# Patient Record
Sex: FEMALE | Race: WHITE | NOT HISPANIC OR LATINO | Employment: PART TIME | URBAN - METROPOLITAN AREA
[De-identification: names, ages, dates, MRNs, and addresses within clinical notes are randomized per-mention and may not be internally consistent; named-entity substitution may affect disease eponyms.]

---

## 2018-10-01 ENCOUNTER — HOSPITAL ENCOUNTER (OUTPATIENT)
Dept: FAMILY MEDICINE CLINIC | Facility: CLINIC | Age: 33
Setting detail: SPECIMEN
Discharge: HOME | End: 2018-10-01
Attending: PREVENTIVE MEDICINE | Admitting: PREVENTIVE MEDICINE

## 2018-10-01 LAB
ALBUMIN SERPL-MCNC: 4 G/DL (ref 3.5–4.8)
ALBUMIN/GLOB SERPL: 1.3 {RATIO} (ref 1–1.7)
ALP SERPL-CCNC: 70 IU/L (ref 32–91)
ALT SERPL-CCNC: 26 IU/L (ref 14–54)
ANION GAP SERPL CALC-SCNC: 12.8 MMOL/L (ref 10–20)
AST SERPL-CCNC: 21 IU/L (ref 15–41)
BASOPHILS # BLD AUTO: 0 10*3/UL (ref 0–0.2)
BASOPHILS NFR BLD AUTO: 1 % (ref 0–2)
BILIRUB SERPL-MCNC: 0.6 MG/DL (ref 0.3–1.2)
BUN SERPL-MCNC: 11 MG/DL (ref 8–20)
BUN/CREAT SERPL: 15.7 (ref 5.4–26.2)
CALCIUM SERPL-MCNC: 9.2 MG/DL (ref 8.9–10.3)
CHLORIDE SERPL-SCNC: 101 MMOL/L (ref 101–111)
CHOLEST SERPL-MCNC: 213 MG/DL
CHOLEST/HDLC SERPL: 4.9 {RATIO}
CONV CO2: 25 MMOL/L (ref 22–32)
CONV LDL CHOLESTEROL DIRECT: 157 MG/DL (ref 0–100)
CONV TOTAL PROTEIN: 7.1 G/DL (ref 6.1–7.9)
CREAT UR-MCNC: 0.7 MG/DL (ref 0.4–1)
DIFFERENTIAL METHOD BLD: (no result)
EOSINOPHIL # BLD AUTO: 0.1 10*3/UL (ref 0–0.3)
EOSINOPHIL # BLD AUTO: 1 % (ref 0–3)
ERYTHROCYTE [DISTWIDTH] IN BLOOD BY AUTOMATED COUNT: 12.1 % (ref 11.5–14.5)
ERYTHROCYTE [SEDIMENTATION RATE] IN BLOOD BY WESTERGREN METHOD: 48 MM/HR (ref 0–20)
GLOBULIN UR ELPH-MCNC: 3.1 G/DL (ref 2.5–3.8)
GLUCOSE SERPL-MCNC: 104 MG/DL (ref 65–99)
HCT VFR BLD AUTO: 36.9 % (ref 35–49)
HDLC SERPL-MCNC: 43 MG/DL
HGB BLD-MCNC: 12.9 G/DL (ref 12–15)
LDLC/HDLC SERPL: 3.6 {RATIO}
LIPID INTERPRETATION: ABNORMAL
LYMPHOCYTES # BLD AUTO: 2.2 10*3/UL (ref 0.8–4.8)
LYMPHOCYTES NFR BLD AUTO: 36 % (ref 18–42)
MAGNESIUM SERPL-MCNC: 1.8 MG/DL (ref 1.8–2.5)
MCH RBC QN AUTO: 32.4 PG (ref 26–32)
MCHC RBC AUTO-ENTMCNC: 34.9 G/DL (ref 32–36)
MCV RBC AUTO: 92.8 FL (ref 80–94)
MONOCYTES # BLD AUTO: 0.6 10*3/UL (ref 0.1–1.3)
MONOCYTES NFR BLD AUTO: 10 % (ref 2–11)
NEUTROPHILS # BLD AUTO: 3.3 10*3/UL (ref 2.3–8.6)
NEUTROPHILS NFR BLD AUTO: 52 % (ref 50–75)
NRBC BLD AUTO-RTO: 0 /100{WBCS}
NRBC/RBC NFR BLD MANUAL: 0 10*3/UL
PLATELET # BLD AUTO: 487 10*3/UL (ref 150–450)
PMV BLD AUTO: 6.8 FL (ref 7.4–10.4)
POTASSIUM SERPL-SCNC: 3.8 MMOL/L (ref 3.6–5.1)
RBC # BLD AUTO: 3.97 10*6/UL (ref 4–5.4)
SODIUM SERPL-SCNC: 135 MMOL/L (ref 136–144)
TRIGL SERPL-MCNC: 166 MG/DL
TSH SERPL-ACNC: 1.37 UIU/ML (ref 0.34–5.6)
VIT B12 SERPL-MCNC: 211 PG/ML (ref 180–914)
VLDLC SERPL CALC-MCNC: 12.3 MG/DL
WBC # BLD AUTO: 6.3 10*3/UL (ref 4.5–11.5)

## 2018-10-02 LAB
25(OH)D3 SERPL-MCNC: 22 NG/ML (ref 30–100)
HBA1C MFR BLD: 5 % (ref 0–5.6)

## 2018-11-13 ENCOUNTER — HOSPITAL ENCOUNTER (OUTPATIENT)
Dept: FAMILY MEDICINE CLINIC | Facility: CLINIC | Age: 33
Setting detail: SPECIMEN
Discharge: HOME | End: 2018-11-13
Attending: PREVENTIVE MEDICINE | Admitting: PREVENTIVE MEDICINE

## 2018-11-13 LAB
AMPICILLIN SUSC ISLT: NORMAL
AZTREONAM SUSC ISLT: NORMAL
BACTERIA ISLT: NORMAL
BACTERIA SPEC AEROBE CULT: NORMAL
BASOPHILS # BLD AUTO: 0 10*3/UL (ref 0–0.2)
BASOPHILS NFR BLD AUTO: 1 % (ref 0–2)
BILIRUB UR QL STRIP: NEGATIVE MG/DL
CASTS URNS QL MICRO: ABNORMAL /[LPF]
CEFAZOLIN SUSC ISLT: NORMAL
CEFEPIME SUSC ISLT: NORMAL
CEFTRIAXONE SUSC ISLT: NORMAL
CHOLEST SERPL-MCNC: 217 MG/DL
CHOLEST/HDLC SERPL: 4.6 {RATIO}
CIPROFLOXACIN SUSC ISLT: NORMAL
COLONY COUNT: NORMAL
COLOR UR: YELLOW
CONV BACTERIA IN URINE MICRO: ABNORMAL
CONV CLARITY OF URINE: ABNORMAL
CONV HYALINE CASTS IN URINE MICRO: 2 /[LPF] (ref 0–5)
CONV LDL CHOLESTEROL DIRECT: 154 MG/DL (ref 0–100)
CONV PROTEIN IN URINE BY AUTOMATED TEST STRIP: NEGATIVE MG/DL
CONV SMALL ROUND CELLS: ABNORMAL /[HPF]
CONV UROBILINOGEN IN URINE BY AUTOMATED TEST STRIP: 0.2 MG/DL
CULTURE INDICATED?: ABNORMAL
DIFFERENTIAL METHOD BLD: (no result)
EOSINOPHIL # BLD AUTO: 0.1 10*3/UL (ref 0–0.3)
EOSINOPHIL # BLD AUTO: 2 % (ref 0–3)
ERYTHROCYTE [DISTWIDTH] IN BLOOD BY AUTOMATED COUNT: 12.5 % (ref 11.5–14.5)
GLUCOSE UR QL: NEGATIVE MG/DL
HCT VFR BLD AUTO: 38 % (ref 35–49)
HDLC SERPL-MCNC: 48 MG/DL
HGB BLD-MCNC: 13.1 G/DL (ref 12–15)
HGB UR QL STRIP: NEGATIVE
KETONES UR QL STRIP: NEGATIVE MG/DL
LDLC/HDLC SERPL: 3.2 {RATIO}
LEUKOCYTE ESTERASE UR QL STRIP: ABNORMAL
LEVOFLOXACIN SUSC ISLT: NORMAL
LIPID INTERPRETATION: ABNORMAL
LYMPHOCYTES # BLD AUTO: 2 10*3/UL (ref 0.8–4.8)
LYMPHOCYTES NFR BLD AUTO: 39 % (ref 18–42)
Lab: NORMAL
MCH RBC QN AUTO: 32.5 PG (ref 26–32)
MCHC RBC AUTO-ENTMCNC: 34.5 G/DL (ref 32–36)
MCV RBC AUTO: 94.4 FL (ref 80–94)
MEROPENEM SUSC ISLT: NORMAL
MICRO REPORT STATUS: NORMAL
MONOCYTES # BLD AUTO: 0.4 10*3/UL (ref 0.1–1.3)
MONOCYTES NFR BLD AUTO: 8 % (ref 2–11)
NEUTROPHILS # BLD AUTO: 2.6 10*3/UL (ref 2.3–8.6)
NEUTROPHILS NFR BLD AUTO: 50 % (ref 50–75)
NITRITE UR QL STRIP: NEGATIVE
NITROFURANTOIN SUSC ISLT: NORMAL
NRBC BLD AUTO-RTO: 0 /100{WBCS}
NRBC/RBC NFR BLD MANUAL: 0 10*3/UL
PH UR STRIP.AUTO: 5.5 [PH] (ref 4.5–8)
PIP+TAZO SUSC ISLT: NORMAL
PLATELET # BLD AUTO: 471 10*3/UL (ref 150–450)
PMV BLD AUTO: 6.5 FL (ref 7.4–10.4)
RBC # BLD AUTO: 4.02 10*6/UL (ref 4–5.4)
RBC #/AREA URNS HPF: 8 /[HPF] (ref 0–3)
SP GR UR: 1.02 (ref 1–1.03)
SPECIMEN SOURCE: NORMAL
SPERM URNS QL MICRO: ABNORMAL /[HPF]
SQUAMOUS SPT QL MICRO: 9 /[HPF] (ref 0–5)
SUSC METH SPEC: NORMAL
TETRACYCLINE SUSC ISLT: NORMAL
TOBRAMYCIN SUSC ISLT: NORMAL
TRIGL SERPL-MCNC: 167 MG/DL
TRIMETHOPRIM/SULFA: NORMAL
UNIDENT CRYS URNS QL MICRO: ABNORMAL /[HPF]
VLDLC SERPL CALC-MCNC: 16 MG/DL
WBC # BLD AUTO: 5.2 10*3/UL (ref 4.5–11.5)
WBC #/AREA URNS HPF: 8 /[HPF] (ref 0–5)
YEAST SPEC QL WET PREP: ABNORMAL /[HPF]

## 2019-01-03 ENCOUNTER — HOSPITAL ENCOUNTER (OUTPATIENT)
Dept: FAMILY MEDICINE CLINIC | Facility: CLINIC | Age: 34
Setting detail: SPECIMEN
Discharge: HOME | End: 2019-01-03
Attending: PREVENTIVE MEDICINE | Admitting: PREVENTIVE MEDICINE

## 2019-01-03 LAB
ALBUMIN SERPL-MCNC: 4.1 G/DL (ref 3.5–4.8)
ALBUMIN/GLOB SERPL: 1.2 {RATIO} (ref 1–1.7)
ALP SERPL-CCNC: 65 IU/L (ref 32–91)
ALT SERPL-CCNC: 24 IU/L (ref 14–54)
ANION GAP SERPL CALC-SCNC: 12.1 MMOL/L (ref 10–20)
AST SERPL-CCNC: 16 IU/L (ref 15–41)
BASOPHILS # BLD AUTO: 0 10*3/UL (ref 0–0.2)
BASOPHILS NFR BLD AUTO: 0 % (ref 0–2)
BILIRUB SERPL-MCNC: 0.8 MG/DL (ref 0.3–1.2)
BUN SERPL-MCNC: 13 MG/DL (ref 8–20)
BUN/CREAT SERPL: 21.7 (ref 5.4–26.2)
CALCIUM SERPL-MCNC: 9.2 MG/DL (ref 8.9–10.3)
CHLORIDE SERPL-SCNC: 103 MMOL/L (ref 101–111)
CHOLEST SERPL-MCNC: 214 MG/DL
CHOLEST/HDLC SERPL: 4.6 {RATIO}
CONV CO2: 23 MMOL/L (ref 22–32)
CONV LDL CHOLESTEROL DIRECT: 162 MG/DL (ref 0–100)
CONV TOTAL PROTEIN: 7.4 G/DL (ref 6.1–7.9)
CREAT UR-MCNC: 0.6 MG/DL (ref 0.4–1)
DIFFERENTIAL METHOD BLD: (no result)
EOSINOPHIL # BLD AUTO: 0.1 10*3/UL (ref 0–0.3)
EOSINOPHIL # BLD AUTO: 1 % (ref 0–3)
ERYTHROCYTE [DISTWIDTH] IN BLOOD BY AUTOMATED COUNT: 12.7 % (ref 11.5–14.5)
ERYTHROCYTE [SEDIMENTATION RATE] IN BLOOD BY WESTERGREN METHOD: 6 MM/HR (ref 0–20)
GLOBULIN UR ELPH-MCNC: 3.3 G/DL (ref 2.5–3.8)
GLUCOSE SERPL-MCNC: 95 MG/DL (ref 65–99)
HCT VFR BLD AUTO: 38.5 % (ref 35–49)
HDLC SERPL-MCNC: 46 MG/DL
HGB BLD-MCNC: 13.3 G/DL (ref 12–15)
LDLC/HDLC SERPL: 3.5 {RATIO}
LIPID INTERPRETATION: ABNORMAL
LYMPHOCYTES # BLD AUTO: 1.2 10*3/UL (ref 0.8–4.8)
LYMPHOCYTES NFR BLD AUTO: 13 % (ref 18–42)
MCH RBC QN AUTO: 32.8 PG (ref 26–32)
MCHC RBC AUTO-ENTMCNC: 34.7 G/DL (ref 32–36)
MCV RBC AUTO: 94.5 FL (ref 80–94)
MONOCYTES # BLD AUTO: 0.7 10*3/UL (ref 0.1–1.3)
MONOCYTES NFR BLD AUTO: 8 % (ref 2–11)
NEUTROPHILS # BLD AUTO: 6.8 10*3/UL (ref 2.3–8.6)
NEUTROPHILS NFR BLD AUTO: 78 % (ref 50–75)
NRBC BLD AUTO-RTO: 0 /100{WBCS}
NRBC/RBC NFR BLD MANUAL: 0 10*3/UL
PLATELET # BLD AUTO: 471 10*3/UL (ref 150–450)
PMV BLD AUTO: 6.6 FL (ref 7.4–10.4)
POTASSIUM SERPL-SCNC: 4.1 MMOL/L (ref 3.6–5.1)
RBC # BLD AUTO: 4.07 10*6/UL (ref 4–5.4)
SODIUM SERPL-SCNC: 134 MMOL/L (ref 136–144)
TRIGL SERPL-MCNC: 213 MG/DL
VLDLC SERPL CALC-MCNC: 5.9 MG/DL
WBC # BLD AUTO: 8.8 10*3/UL (ref 4.5–11.5)

## 2019-01-21 ENCOUNTER — CLINICAL SUPPORT (OUTPATIENT)
Dept: ONCOLOGY | Facility: HOSPITAL | Age: 34
End: 2019-01-21

## 2019-01-21 ENCOUNTER — HOSPITAL ENCOUNTER (OUTPATIENT)
Dept: ONCOLOGY | Facility: HOSPITAL | Age: 34
Discharge: HOME | End: 2019-01-21
Attending: INTERNAL MEDICINE | Admitting: INTERNAL MEDICINE

## 2019-01-21 ENCOUNTER — HOSPITAL ENCOUNTER (OUTPATIENT)
Dept: ONCOLOGY | Facility: CLINIC | Age: 34
Setting detail: INFUSION SERIES
Discharge: HOME | End: 2019-01-21
Attending: INTERNAL MEDICINE | Admitting: INTERNAL MEDICINE

## 2019-01-21 LAB
CRP SERPL-MCNC: 0.41 MG/DL (ref 0–0.7)
ERYTHROCYTE [SEDIMENTATION RATE] IN BLOOD BY WESTERGREN METHOD: 41 MM/HR (ref 0–20)
IRON SATN MFR SERPL: 14 % (ref 15–50)
IRON SERPL-MCNC: 49 UG/DL (ref 28–170)
TIBC SERPL-MCNC: 338 UG/DL (ref 228–428)

## 2019-01-21 NOTE — PROGRESS NOTES
PATIENTS ONCOLOGY RECORD LOCATED IN [facility]      Subjective     Name:  ELFEGO KIM     Date:  2019  Address:  [address] [place], [place] [address] [address]  Home: [unfilled]  :  1985 AGE:  33 y.o.        RECORDS OBTAINED:  Patients Oncology Record is located in [facility]

## 2019-02-04 ENCOUNTER — CLINICAL SUPPORT (OUTPATIENT)
Dept: ONCOLOGY | Facility: HOSPITAL | Age: 34
End: 2019-02-04

## 2019-02-04 ENCOUNTER — HOSPITAL ENCOUNTER (OUTPATIENT)
Dept: ONCOLOGY | Facility: CLINIC | Age: 34
Setting detail: INFUSION SERIES
Discharge: HOME | End: 2019-02-04
Attending: INTERNAL MEDICINE | Admitting: INTERNAL MEDICINE

## 2019-02-04 NOTE — PROGRESS NOTES
PATIENTS ONCOLOGY RECORD LOCATED IN [facility]      Subjective     Name:  ELFEGO KIM     Date:  2019  Address:  [address] [place], [place] [address] [id number]  Home: [unfilled]  :  1985 AGE:  33 y.o.        RECORDS OBTAINED:  Patients Oncology Record is located in [facility]

## 2019-03-19 ENCOUNTER — HOSPITAL ENCOUNTER (OUTPATIENT)
Dept: ONCOLOGY | Facility: CLINIC | Age: 34
Discharge: HOME | End: 2019-03-19
Attending: INTERNAL MEDICINE | Admitting: INTERNAL MEDICINE

## 2019-05-29 ENCOUNTER — HOSPITAL ENCOUNTER (OUTPATIENT)
Dept: FAMILY MEDICINE CLINIC | Facility: CLINIC | Age: 34
Setting detail: SPECIMEN
Discharge: HOME | End: 2019-05-29
Attending: PREVENTIVE MEDICINE | Admitting: PREVENTIVE MEDICINE

## 2019-05-29 LAB
ALBUMIN SERPL-MCNC: 4 G/DL (ref 3.5–4.8)
ALBUMIN/GLOB SERPL: 1.3 {RATIO} (ref 1–1.7)
ALP SERPL-CCNC: 56 IU/L (ref 32–91)
ALT SERPL-CCNC: 31 IU/L (ref 14–54)
ANION GAP SERPL CALC-SCNC: 11.9 MMOL/L (ref 10–20)
AST SERPL-CCNC: 21 IU/L (ref 15–41)
BASOPHILS # BLD AUTO: 0 10*3/UL (ref 0–0.2)
BASOPHILS NFR BLD AUTO: 1 % (ref 0–2)
BILIRUB SERPL-MCNC: 0.4 MG/DL (ref 0.3–1.2)
BUN SERPL-MCNC: 8 MG/DL (ref 8–20)
BUN/CREAT SERPL: 11.4 (ref 5.4–26.2)
CALCIUM SERPL-MCNC: 9.1 MG/DL (ref 8.9–10.3)
CHLORIDE SERPL-SCNC: 104 MMOL/L (ref 101–111)
CHOLEST SERPL-MCNC: 222 MG/DL
CHOLEST/HDLC SERPL: 5.1 {RATIO}
CONV CO2: 23 MMOL/L (ref 22–32)
CONV LDL CHOLESTEROL DIRECT: 156 MG/DL (ref 0–100)
CONV TOTAL PROTEIN: 7.2 G/DL (ref 6.1–7.9)
CREAT UR-MCNC: 0.7 MG/DL (ref 0.4–1)
DIFFERENTIAL METHOD BLD: (no result)
EOSINOPHIL # BLD AUTO: 0.1 10*3/UL (ref 0–0.3)
EOSINOPHIL # BLD AUTO: 2 % (ref 0–3)
ERYTHROCYTE [DISTWIDTH] IN BLOOD BY AUTOMATED COUNT: 12.2 % (ref 11.5–14.5)
GLOBULIN UR ELPH-MCNC: 3.2 G/DL (ref 2.5–3.8)
GLUCOSE SERPL-MCNC: 92 MG/DL (ref 65–99)
HCT VFR BLD AUTO: 38.5 % (ref 35–49)
HDLC SERPL-MCNC: 44 MG/DL
HGB BLD-MCNC: 12.9 G/DL (ref 12–15)
LDLC/HDLC SERPL: 3.6 {RATIO}
LIPID INTERPRETATION: ABNORMAL
LYMPHOCYTES # BLD AUTO: 2.3 10*3/UL (ref 0.8–4.8)
LYMPHOCYTES NFR BLD AUTO: 42 % (ref 18–42)
MCH RBC QN AUTO: 31.7 PG (ref 26–32)
MCHC RBC AUTO-ENTMCNC: 33.5 G/DL (ref 32–36)
MCV RBC AUTO: 94.6 FL (ref 80–94)
MONOCYTES # BLD AUTO: 0.4 10*3/UL (ref 0.1–1.3)
MONOCYTES NFR BLD AUTO: 8 % (ref 2–11)
NEUTROPHILS # BLD AUTO: 2.6 10*3/UL (ref 2.3–8.6)
NEUTROPHILS NFR BLD AUTO: 47 % (ref 50–75)
NRBC BLD AUTO-RTO: 0 /100{WBCS}
NRBC/RBC NFR BLD MANUAL: 0 10*3/UL
PLATELET # BLD AUTO: 463 10*3/UL (ref 150–450)
PMV BLD AUTO: 6.9 FL (ref 7.4–10.4)
POTASSIUM SERPL-SCNC: 3.9 MMOL/L (ref 3.6–5.1)
RBC # BLD AUTO: 4.07 10*6/UL (ref 4–5.4)
SODIUM SERPL-SCNC: 135 MMOL/L (ref 136–144)
TRIGL SERPL-MCNC: 246 MG/DL
TSH SERPL-ACNC: 1.2 UIU/ML (ref 0.34–5.6)
VIT B12 SERPL-MCNC: 247 PG/ML (ref 180–914)
VLDLC SERPL CALC-MCNC: 22.3 MG/DL
WBC # BLD AUTO: 5.4 10*3/UL (ref 4.5–11.5)

## 2019-05-30 LAB — 25(OH)D3 SERPL-MCNC: 27 NG/ML (ref 30–100)

## 2019-05-31 ENCOUNTER — CONVERSION ENCOUNTER (OUTPATIENT)
Dept: FAMILY MEDICINE CLINIC | Facility: CLINIC | Age: 34
End: 2019-05-31

## 2019-06-04 VITALS
OXYGEN SATURATION: 97 % | DIASTOLIC BLOOD PRESSURE: 77 MMHG | SYSTOLIC BLOOD PRESSURE: 110 MMHG | BODY MASS INDEX: 35.38 KG/M2 | HEART RATE: 85 BPM | WEIGHT: 192.25 LBS | HEIGHT: 62 IN | RESPIRATION RATE: 16 BRPM

## 2019-06-06 NOTE — PROGRESS NOTES
Visit Type:  Follow-up Visit  Referring Provider:  Belinda Smith MD  Primary Provider:  Luis LÓPEZ MD MPH    CC:  depression and lab work .    History of Present Illness:  Patient comes in for Prevention physical and is UTD on all health maintainence marks        This is a 33 years old female who presents with depression.  She complains of drastic weight changes, but denies anxiety.  The patient has not tried Amitriptyline, Celexa, Cymbalta, Depakote, Effexor, Elavil, Haldol, Lexapro, Paxil, Prozac, Remeron,   Serzone, Wellbutrin, Xanax and Zoloft.  For counseling the patient has not seen anyone.  The patient has had problems in the past with nothing.  Concerned about thrombocytosis and wants to wait and repeat labs 8/19-no severe bleeding problems or clots.    Still having problems with urinary control   Doesn't want to see psyche but will try CBT on line.      Past Medical History:     Reviewed history from 09/28/2018 and no changes required:        headaches        sweats        neck pain        shoulder pain        bloating         diarrhea        Stomach pain        sinus problems        bruise easily         changes in moles         hot flashes        No know drug allergies?    Past Surgical History:     Reviewed history from 09/28/2018 and no changes required:        2 c sections         eye injury repair eye w/ 2 other follow up surgeries        partial hysterectomy         eye muscle surgery         repair of the cervix    Family History Summary:      Reviewed history Last on 02/15/2019 and no changes required:05/31/2019  Sister - Has Family History of Psychiatric Care - Entered On: 9/28/2018  Father - Has Family History of Hypertension - Entered On: 9/28/2018  Father - Has Family History of Diabetes - Entered On: 9/28/2018      Social History:     Reviewed history from 01/03/2019 and no changes required:        Passive Smoke: N        Alcohol Use: N        Drug Use: N        HIV/High Risk: N         Regular Exercise: N        Hx Domestic Abuse: N        Buddhism Affecting Care: N        Marital Status:         Children: 3        Occupation: [employer]                Smoking History:        Patient has never smoked.      Active Medications (reviewed today):  None    Current Allergies (reviewed today):  No known allergies    Current Medications (including medications started today):   None      Risk Factors:     Smoked Tobacco Use:  Never smoker  Smokeless Tobacco Use:  Never  Passive smoke exposure:  no  Drug use:  no  HIV high-risk behavior:  no  Caffeine use:  2 drinks per day  Alcohol use:  no  Exercise:  no  Seatbelt use:  100 %  Sun Exposure:  occasionally    Dietary Counseling: yes    Previous Tobacco Use: Signed On - 02/15/2019  Smoked Tobacco Use:  Never smoker  Smokeless Tobacco Use:  Never  Passive smoke exposure:  no  Drug use:  no  HIV high-risk behavior:  no  Caffeine use:  2 drinks per day    Previous Alcohol Use: Signed On - 02/15/2019  Alcohol use:  no  Exercise:  no  Seatbelt use:  100 %  Sun Exposure:  occasionally    Dietary Counseling: yes      Review of Systems     General       Complains of weight gain.    Eyes       Denies vision loss - 1 eye, double vision, eye irritation, vision loss - both eyes, blurring, eye pain, halos, discharge, light sensitivity, Color Blindness, Decreased night vision, excessive tearing, eye redness, periorbital puffiness and wears   glass/contacts.    ENT       Denies ringing in the ears, ear discharge, earache, decreased hearing, nasal congestion, nosebleeds, difficulty swallowing, hoarseness, sore throat, Post-nasal drainage, sneezing, bleeding gums, oral ulcers, decreased sense of smell, dental issues,   runny nose, sinus pain and decreased sense of taste.    CV       Denies difficulty breathing at night, near fainting, chest pain or discomfort, racing/skipping heart beats, fatigue, lightheadedness, shortness of breath with exertion,  palpitations, swelling of hands or feet, difficulty breathing while lying down,   fainting, leg cramps with exertion, bluish discoloration of lips or nails, weight gain, leg cramps, leg pain, varicose veins, paroxysmal nocturnal dyspnea and bluish or purplish discoloration of hands/feet.    Resp       Denies sleep disturbances due to breathing, cough, shortness of breath, coughing up blood, chest discomfort, wheezing, excessive sputum, excessive snoring and TB exposure risk.    GI       Denies excessive appetite, loss of appetite, indigestion, vomiting blood, nausea, vomiting, yellowish skin color, gas, abdominal pain, abdominal bloating, hemorrhoids, diarrhea, change in bowel habits, constipation, dark tarry stools, bloody stools,   abdominal mass, abdominal swelling, food intolerance, early satiety, stool incontinence, laxative use, odynophagia, painful defecation, need for antacids and blood after wiping.           Complains of inability to control bladder.    MS       Denies muscle cramps, joint pain, joint swelling, presence of joint fluid, back pain, stiffness, muscle weakness, arthritis, gout, loss of strength, muscle aches, neck pain, decreased ROM and joint redness.    Derm       Denies excessive perspiration, night sweats, suspicious lesions, changes in nail beds, dryness, poor wound healing, unusual hair distribution, skin cancer, itching, changes in color of skin, flushing, rash, change in lesion, hair loss, change in hair   texture, lesion with inflammation, lesion with increase in size and lesion with change in color.    Neuro       Denies difficulty with concentration, poor balance, headaches, disturbances in coordination, numbness, inability to speak, falling down, tingling, brief paralysis, visual disturbances, seizures, weakness, sensation of room spinning, tremors, fainting,   excessive daytime sleeping, memory loss, dizziness, attention deficit, hyperactivity, unusual sensation and restless  legs.    Psych       Complains of mental problems and depression.    Endo       Denies excessive hunger, cold intolerance, heat intolerance, excessive urination, excessive    thirst, weight change, appetite changes, excessive sweating, hair changes, hot flashes, libido change, change in body hair and Hypoglycemic episodes.    Heme       Denies enlarged lymph nodes, bleeding, skin discoloration, abnormal bruising, fevers and nose bleeds.    Allergy       Complains of seasonal allergies.    Breast       Denies Mass, Pain, Swelling, Nipple discharge, Nipple pain, Skin changes and Change in size.      Vital Signs:    Patient Profile:    33 Years Old Female  Height:     62 inches  Weight:     192.25 pounds  BMI:        35.16   ( Added Problem:Body mass index (BMI) 35.0-35.99, adult ( EYA71-J74.35 ) )  BSA:        1.88  O2 Sat:     97 %  Temp:       98.1 degrees F oral  Pulse rate: 85 / minute  Resp:       16 per minute  BP Sittin / 77  (left arm)    Cuff size:  large      Problems: Active problems were reviewed with the patient during this visit.  Medications: Medications were reviewed with the patient during this visit.  Allergies: Allergies were reviewed with the patient during this visit.  No Known Medication.  No Known Allergy.  No Known Drug Allergy.        Vitals Entered By: Belinda Smith MD (May 31, 2019 9:43 AM)      Serial Vital Signs/Assessments:    Comments:  9:45 AM  sitting   By: Belinda Smith MD        Physical Exam    General:      obese.    Head:      normocephalic and atraumatic.    Eyes:      PERRL/EOM intact, conjunctiva and sclera clear with out nystagmus.    Ears:      TM's intact and clear with normal canals with grossly normal hearing.    Nose:      no deformity, discharge, inflammation, or lesions.    Mouth:      no deformity or lesions with good dentition.    Neck:      no masses, thyromegaly, or abnormal cervical nodes.    Lungs:      clear bilaterally to auscultation.    Heart:       non-displaced PMI, chest non-tender; regular rate and rhythm, S1, S2 without murmurs, rubs, or gallops  Abdomen:       normal bowel sounds; no hepatosplenomegaly no ventral,umbilical hernias or masses noted.    Msk:      no deformity or scoliosis noted of thoracic or lumbar spine.    Pulses:      pulses normal in all 4 extremities.    Extremities:      no clubbing, cyanosis, edema, or deformity noted with normal full range of motion of joints of all four extremities   Neurologic:      no focal deficits, cranial nerves II-XII grossly intact with normal sensation, reflexes, coordination, muscle strength and tone.    Skin:      intact without lesions or rashes.    Cervical Nodes:      no significant adenopathy.    Inguinal Nodes:      no significant adenopathy.    Psych:      depressed affect.  PHQ-9=14    Diabetes Management Exam:      Foot Exam (with socks and/or shoes not present):        Pulses:           pulses normal in all 4 extremities.        Blood Pressure:  Today's BP: 110/77 mm Hg    Labwork:   Most Recent Lab Results:   LDL: 156 mg/dL 05/29/2019  HbA1c: : 5.0 % 10/02/2018        Impression & Recommendations:    Problem # 1:  Encounter for general adult medical examination with abnormal findings (ICD-V70.0) (BWC86-W33.01)  PAP when due  Orders:  Est. Well Exam 18-39 yrs. (27472)      Problem # 2:  DEPRESSION/ANXIETY (ICD-300.4) (OKG67-G86.8)  CBT-call if worsens    Problem # 3:  Urinary incontinence (ICD-788.30) (VXU48-D82)    Orders:  Urinalysis Dip Stick/Tablet Rgnt AUTO W/O Celio ([id number])neg  Eval urology  Orders:  Urinalysis Dip Stick/Tablet Rgnt AUTO W/O Celio ([id number])      Problem # 4:  Hyperlipidemia (ICD-272.4) (RIQ12-M13.5)  Decrease sat fats    Problem # 5:  BMI 34-34.9 adult (ICD-V85.34) (YVL82-M78.34)  increase exercise pool if joint pain    Problem # 6:  Thrombocytosis (ICD-238.71) (WNR44-Y27.3)  CBC and Sed Rate 8/19 unless notes blood clots or bleeding      Patient Instructions:  1)  Explore  Cognitive  2)  behavioral Therapy [facility].  3)  Set up appointment with urologist.  4)  Repeat CBC, sed rate and Lipid end of   5)  August. Recheck week after. 12 hour fast for labs.  May have black coffee without cream or sugar.  take medications with water.  6)  Eval urology due to urinary problems.  Increase walking walk 20 minutes 6 days/ week  7)  Decrease sat fats.  Water exercise is good.                        Medication Administration    Orders Added:  1)  Ofc Vst, Est Level III [13775]  2)  Urology Consult [UrolOC]  3)  Urinalysis Dip Stick/Tablet Rgnt AUTO W/O Celio [[id number]]  4)  Est. Well Exam 18-39 yrs. [[id number]]    PHQ-9 Survey Results     Scoring Results:  Scoring does not suggest diagnosis of Major or Minor Depression.  Total score is: 14.  Scoring suggests patient's functionality is not impaired.    ]  Technician: Brandi TAO    Date/Time Collected: May 31, 2019 11:36 AM)  Date/Time Received: May 31, 2019 11:36 AM)    Routine Urinalysis          Normal Range   Color:      yellow          Color: Yellow   Appearance:  clear          Appearance: Clear  Leukocytes:  negative       Leukocytes: Negative   Nitrite:         negative       Nitrite: Negative  Urobilinogen:    0.2 mg/dL      Urobilinogen: Negative mg/dL  Protein:     negative mg/dL     Protein:  Negative mg/dL  pH:      5.5        pH:  4.5-8.0  Blood:       negative       Blood:  Negative  Spec. Gravity:   1.025      Spec Gravity:  1.005-1.030  Ketones:     negative mg/dL     Ketones:  Negative mg/dL  Bilirubin:   negative       Bilirubin:  Negative  Glucose:     negative mg/dL     Glucose:  Negative mg/dL                      Electronically signed by Belinda Smith MD on 05/31/2019 at 6:11 PM  ________________________________________________________________________       Disclaimer: Converted Note message may not contain all data elements that existed in the [employer] source system. Please see LOGIC DEVICES System for the  original note details.

## 2019-08-02 ENCOUNTER — TELEPHONE (OUTPATIENT)
Dept: ONCOLOGY | Facility: CLINIC | Age: 34
End: 2019-08-02

## 2019-08-06 ENCOUNTER — APPOINTMENT (OUTPATIENT)
Dept: ONCOLOGY | Facility: CLINIC | Age: 34
End: 2019-08-06

## 2019-08-27 ENCOUNTER — CLINICAL SUPPORT (OUTPATIENT)
Dept: FAMILY MEDICINE CLINIC | Facility: CLINIC | Age: 34
End: 2019-08-27

## 2019-08-27 ENCOUNTER — TELEPHONE (OUTPATIENT)
Dept: FAMILY MEDICINE CLINIC | Facility: CLINIC | Age: 34
End: 2019-08-27

## 2019-08-27 DIAGNOSIS — E78.5 HYPERLIPIDEMIA, UNSPECIFIED HYPERLIPIDEMIA TYPE: ICD-10-CM

## 2019-08-27 DIAGNOSIS — F41.8 MIXED ANXIETY DEPRESSIVE DISORDER: ICD-10-CM

## 2019-08-27 DIAGNOSIS — E53.8 B12 DEFICIENCY: ICD-10-CM

## 2019-08-27 DIAGNOSIS — D75.839 THROMBOCYTOSIS: ICD-10-CM

## 2019-08-27 DIAGNOSIS — E55.9 VITAMIN D DEFICIENCY: ICD-10-CM

## 2019-08-27 DIAGNOSIS — E78.5 HYPERLIPIDEMIA, UNSPECIFIED HYPERLIPIDEMIA TYPE: Primary | ICD-10-CM

## 2019-08-27 PROBLEM — M54.50 LOW BACK PAIN: Status: ACTIVE | Noted: 2019-02-15

## 2019-08-27 PROBLEM — R32 URINARY INCONTINENCE: Status: ACTIVE | Noted: 2019-05-31

## 2019-08-27 LAB
ALBUMIN SERPL-MCNC: 4.2 G/DL (ref 3.5–4.8)
ALBUMIN/GLOB SERPL: 1.3 G/DL (ref 1–1.7)
ALP SERPL-CCNC: 60 U/L (ref 32–91)
ALT SERPL W P-5'-P-CCNC: 25 U/L (ref 14–54)
ANION GAP SERPL CALCULATED.3IONS-SCNC: 17.5 MMOL/L (ref 5–15)
ARTICHOKE IGE QN: 146 MG/DL (ref 0–100)
AST SERPL-CCNC: 18 U/L (ref 15–41)
BASOPHILS # BLD AUTO: 0 10*3/MM3 (ref 0–0.2)
BASOPHILS NFR BLD AUTO: 0.6 % (ref 0–1.5)
BILIRUB SERPL-MCNC: 0.6 MG/DL (ref 0.3–1.2)
BUN BLD-MCNC: 11 MG/DL (ref 8–20)
BUN/CREAT SERPL: 15.7 (ref 5.4–26.2)
CALCIUM SPEC-SCNC: 9.8 MG/DL (ref 8.9–10.3)
CHLORIDE SERPL-SCNC: 102 MMOL/L (ref 101–111)
CHOLEST SERPL-MCNC: 205 MG/DL
CO2 SERPL-SCNC: 23 MMOL/L (ref 22–32)
CREAT BLD-MCNC: 0.7 MG/DL (ref 0.4–1)
DEPRECATED RDW RBC AUTO: 41.1 FL (ref 37–54)
EOSINOPHIL # BLD AUTO: 0.1 10*3/MM3 (ref 0–0.4)
EOSINOPHIL NFR BLD AUTO: 1.1 % (ref 0.3–6.2)
ERYTHROCYTE [DISTWIDTH] IN BLOOD BY AUTOMATED COUNT: 12.5 % (ref 12.3–15.4)
GFR SERPL CREATININE-BSD FRML MDRD: 97 ML/MIN/1.73
GLOBULIN UR ELPH-MCNC: 3.2 GM/DL (ref 2.5–3.8)
GLUCOSE BLD-MCNC: 102 MG/DL (ref 65–99)
HCT VFR BLD AUTO: 38.8 % (ref 34–46.6)
HDLC SERPL QL: 4.56
HDLC SERPL-MCNC: 45 MG/DL
HGB BLD-MCNC: 13.5 G/DL (ref 12–15.9)
LDLC/HDLC SERPL: 2.58 {RATIO}
LYMPHOCYTES # BLD AUTO: 2.2 10*3/MM3 (ref 0.7–3.1)
LYMPHOCYTES NFR BLD AUTO: 31.4 % (ref 19.6–45.3)
MCH RBC QN AUTO: 32.8 PG (ref 26.6–33)
MCHC RBC AUTO-ENTMCNC: 34.8 G/DL (ref 31.5–35.7)
MCV RBC AUTO: 94.2 FL (ref 79–97)
MONOCYTES # BLD AUTO: 0.6 10*3/MM3 (ref 0.1–0.9)
MONOCYTES NFR BLD AUTO: 8.3 % (ref 5–12)
NEUTROPHILS # BLD AUTO: 4.1 10*3/MM3 (ref 1.7–7)
NEUTROPHILS NFR BLD AUTO: 58.6 % (ref 42.7–76)
NRBC BLD AUTO-RTO: 0.1 /100 WBC (ref 0–0.2)
PLATELET # BLD AUTO: 484 10*3/MM3 (ref 140–450)
PMV BLD AUTO: 6.6 FL (ref 6–12)
POTASSIUM BLD-SCNC: 4.5 MMOL/L (ref 3.6–5.1)
PROT SERPL-MCNC: 7.4 G/DL (ref 6.1–7.9)
RBC # BLD AUTO: 4.11 10*6/MM3 (ref 3.77–5.28)
SODIUM BLD-SCNC: 138 MMOL/L (ref 136–144)
TRIGL SERPL-MCNC: 220 MG/DL
VIT B12 BLD-MCNC: 508 PG/ML (ref 180–914)
VLDLC SERPL-MCNC: 44 MG/DL
WBC NRBC COR # BLD: 7 10*3/MM3 (ref 3.4–10.8)

## 2019-08-27 PROCEDURE — 85025 COMPLETE CBC W/AUTO DIFF WBC: CPT | Performed by: PREVENTIVE MEDICINE

## 2019-08-27 PROCEDURE — 82306 VITAMIN D 25 HYDROXY: CPT | Performed by: PREVENTIVE MEDICINE

## 2019-08-27 PROCEDURE — 80053 COMPREHEN METABOLIC PANEL: CPT | Performed by: PREVENTIVE MEDICINE

## 2019-08-27 PROCEDURE — 82607 VITAMIN B-12: CPT | Performed by: PREVENTIVE MEDICINE

## 2019-08-27 PROCEDURE — 36415 COLL VENOUS BLD VENIPUNCTURE: CPT | Performed by: PREVENTIVE MEDICINE

## 2019-08-27 PROCEDURE — 80061 LIPID PANEL: CPT | Performed by: PREVENTIVE MEDICINE

## 2019-08-27 NOTE — TELEPHONE ENCOUNTER
patient came in this morning for an appt. That she still had in centricity for labs. No orders were in centricity can you add orders for this blood draw.

## 2019-08-28 DIAGNOSIS — D75.839 THROMBOCYTOSIS: Primary | ICD-10-CM

## 2019-08-28 LAB — 25(OH)D3 SERPL-MCNC: 20 NG/ML (ref 30–100)

## 2019-08-30 ENCOUNTER — TELEPHONE (OUTPATIENT)
Dept: FAMILY MEDICINE CLINIC | Facility: CLINIC | Age: 34
End: 2019-08-30

## 2019-09-03 ENCOUNTER — OFFICE VISIT (OUTPATIENT)
Dept: FAMILY MEDICINE CLINIC | Facility: CLINIC | Age: 34
End: 2019-09-03

## 2019-09-03 VITALS
HEART RATE: 92 BPM | OXYGEN SATURATION: 98 % | TEMPERATURE: 98.2 F | BODY MASS INDEX: 33.9 KG/M2 | RESPIRATION RATE: 16 BRPM | DIASTOLIC BLOOD PRESSURE: 81 MMHG | HEIGHT: 62 IN | SYSTOLIC BLOOD PRESSURE: 135 MMHG | WEIGHT: 184.2 LBS

## 2019-09-03 DIAGNOSIS — D50.9 IRON DEFICIENCY ANEMIA, UNSPECIFIED IRON DEFICIENCY ANEMIA TYPE: Primary | ICD-10-CM

## 2019-09-03 DIAGNOSIS — E55.9 VITAMIN D DEFICIENCY: ICD-10-CM

## 2019-09-03 PROCEDURE — 99213 OFFICE O/P EST LOW 20 MIN: CPT | Performed by: PREVENTIVE MEDICINE

## 2019-09-03 RX ORDER — PNV NO.95/FERROUS FUM/FOLIC AC 28MG-0.8MG
1 TABLET ORAL DAILY
COMMUNITY
End: 2020-02-04

## 2019-09-03 NOTE — PATIENT INSTRUCTIONS
Increase vitamin D 4000.  Cognitive behavioral therapy  [facility]

## 2019-09-03 NOTE — PROGRESS NOTES
"Subjective   Chrystal Walters is a 33 y.o. female presents for   Chief Complaint   Patient presents with   • Hyperlipidemia     already had the labs discuss them today   Still infrequent bladder pain. Following up with urologist.  Trigger when does hospital procedures and will consider CBT    There are no preventive care reminders to display for this patient.    History of Present Illness     Vitals:    09/03/19 0858 09/03/19 0904   BP: 121/80 135/81   BP Location: Right arm Left arm   Patient Position: Sitting Sitting   Cuff Size: Adult Adult   Pulse: 92    Resp: 16    Temp: 98.2 °F (36.8 °C)    TempSrc: Oral    SpO2: 98%    Weight: 83.6 kg (184 lb 3.2 oz)    Height: 157.5 cm (62\")        Current Outpatient Medications on File Prior to Visit   Medication Sig Dispense Refill   • Cholecalciferol (VITAMIN D3) 1000 units capsule Take 4,000 Units by mouth Daily. Take four  gummies daily(1000 each)     • [employer] ASPIRIN ADULT LOW DOSE PO Take 81 mg by mouth Daily.     • Cyanocobalamin ([employer] B12 GUMMIES) 500 MCG chewable tablet Chew 500 mg Daily.     • Ferrous Sulfate (IRON) 325 (65 Fe) MG tablet Take 1 tablet by mouth Daily.     • Multiple Vitamins-Minerals (ONE DAILY MULTIVITAMIN WOMEN) tablet Take 1 tablet by mouth Daily.       No current facility-administered medications on file prior to visit.        The following portions of the patient's history were reviewed and updated as appropriate: allergies, current medications, past family history, past medical history, past social history, past surgical history and problem list.    Review of Systems   Constitutional: Negative.    HENT: Negative.  Negative for sinus pressure and sore throat.    Eyes: Positive for visual disturbance.   Respiratory: Negative.  Negative for cough.    Cardiovascular: Negative.    Gastrointestinal: Negative.    Endocrine: Negative.    Genitourinary: Negative.    Musculoskeletal: Negative.    Skin: Negative.    Allergic/Immunologic: Positive for " environmental allergies.   Neurological: Negative.    Hematological: Negative.    Psychiatric/Behavioral: Positive for behavioral problems.       Objective   Physical Exam   Constitutional: She is oriented to person, place, and time. She appears well-developed.   HENT:   Head: Normocephalic and atraumatic.   R pupil abnormality   Eyes: Conjunctivae and EOM are normal. Pupils are equal, round, and reactive to light.   Neck: Normal range of motion.   Cardiovascular: Normal rate and regular rhythm.   Pulmonary/Chest: Effort normal and breath sounds normal.   Abdominal: Soft. Bowel sounds are normal.   Musculoskeletal: Normal range of motion.   Lymphadenopathy:     She has no cervical adenopathy.   Neurological: She is alert and oriented to person, place, and time.   Skin: Skin is warm and dry.   Psychiatric: She has a normal mood and affect.   Nursing note and vitals reviewed.    PHQ-9 Total Score:      Assessment/Plan   Chrystal was seen today for hyperlipidemia.    Diagnoses and all orders for this visit:    Iron deficiency anemia, unspecified iron deficiency anemia type  -     Ferritin; Future  -     Iron and TIBC; Future  -     Sedimentation rate, automated; Future  -     CBC w AUTO Differential; Future  -     Vitamin D 25 Hydroxy; Future    Vitamin D deficiency        Patient Instructions   Increase vitamin D 4000.  Cognitive behavioral therapy  [facility]

## 2019-10-17 ENCOUNTER — CLINICAL SUPPORT (OUTPATIENT)
Dept: FAMILY MEDICINE CLINIC | Facility: CLINIC | Age: 34
End: 2019-10-17

## 2019-10-17 DIAGNOSIS — D75.839 THROMBOCYTOSIS: ICD-10-CM

## 2019-10-17 DIAGNOSIS — D50.9 IRON DEFICIENCY ANEMIA, UNSPECIFIED IRON DEFICIENCY ANEMIA TYPE: ICD-10-CM

## 2019-10-17 LAB
25(OH)D3 SERPL-MCNC: 34.1 NG/ML (ref 30–100)
BASOPHILS # BLD AUTO: 0.03 10*3/MM3 (ref 0–0.2)
BASOPHILS NFR BLD AUTO: 0.5 % (ref 0–1.5)
DEPRECATED RDW RBC AUTO: 40.4 FL (ref 37–54)
EOSINOPHIL # BLD AUTO: 0.04 10*3/MM3 (ref 0–0.4)
EOSINOPHIL NFR BLD AUTO: 0.7 % (ref 0.3–6.2)
ERYTHROCYTE [DISTWIDTH] IN BLOOD BY AUTOMATED COUNT: 11.8 % (ref 12.3–15.4)
ERYTHROCYTE [SEDIMENTATION RATE] IN BLOOD: 38 MM/HR (ref 0–20)
FERRITIN SERPL-MCNC: 179 NG/ML (ref 13–150)
HCT VFR BLD AUTO: 37.5 % (ref 34–46.6)
HGB BLD-MCNC: 13.1 G/DL (ref 12–15.9)
IMM GRANULOCYTES # BLD AUTO: 0.02 10*3/MM3 (ref 0–0.05)
IMM GRANULOCYTES NFR BLD AUTO: 0.3 % (ref 0–0.5)
IRON 24H UR-MRATE: 105 MCG/DL (ref 37–145)
IRON SATN MFR SERPL: 28 % (ref 20–50)
LYMPHOCYTES # BLD AUTO: 1.84 10*3/MM3 (ref 0.7–3.1)
LYMPHOCYTES NFR BLD AUTO: 31.7 % (ref 19.6–45.3)
MCH RBC QN AUTO: 33 PG (ref 26.6–33)
MCHC RBC AUTO-ENTMCNC: 34.9 G/DL (ref 31.5–35.7)
MCV RBC AUTO: 94.5 FL (ref 79–97)
MONOCYTES # BLD AUTO: 0.39 10*3/MM3 (ref 0.1–0.9)
MONOCYTES NFR BLD AUTO: 6.7 % (ref 5–12)
NEUTROPHILS # BLD AUTO: 3.49 10*3/MM3 (ref 1.7–7)
NEUTROPHILS NFR BLD AUTO: 60.1 % (ref 42.7–76)
NRBC BLD AUTO-RTO: 0 /100 WBC (ref 0–0.2)
PLATELET # BLD AUTO: 506 10*3/MM3 (ref 140–450)
PMV BLD AUTO: 9.1 FL (ref 6–12)
RBC # BLD AUTO: 3.97 10*6/MM3 (ref 3.77–5.28)
TIBC SERPL-MCNC: 371 MCG/DL (ref 298–536)
TRANSFERRIN SERPL-MCNC: 249 MG/DL (ref 200–360)
WBC NRBC COR # BLD: 5.81 10*3/MM3 (ref 3.4–10.8)

## 2019-10-17 PROCEDURE — 36415 COLL VENOUS BLD VENIPUNCTURE: CPT | Performed by: PREVENTIVE MEDICINE

## 2019-10-17 PROCEDURE — 83540 ASSAY OF IRON: CPT | Performed by: PREVENTIVE MEDICINE

## 2019-10-17 PROCEDURE — 84466 ASSAY OF TRANSFERRIN: CPT | Performed by: PREVENTIVE MEDICINE

## 2019-10-17 PROCEDURE — 85652 RBC SED RATE AUTOMATED: CPT | Performed by: PREVENTIVE MEDICINE

## 2019-10-17 PROCEDURE — 82728 ASSAY OF FERRITIN: CPT | Performed by: PREVENTIVE MEDICINE

## 2019-10-17 PROCEDURE — 85025 COMPLETE CBC W/AUTO DIFF WBC: CPT | Performed by: PREVENTIVE MEDICINE

## 2019-10-17 PROCEDURE — 82306 VITAMIN D 25 HYDROXY: CPT | Performed by: PREVENTIVE MEDICINE

## 2020-02-04 ENCOUNTER — OFFICE VISIT (OUTPATIENT)
Dept: FAMILY MEDICINE CLINIC | Facility: CLINIC | Age: 35
End: 2020-02-04

## 2020-02-04 VITALS
BODY MASS INDEX: 35.59 KG/M2 | HEIGHT: 62 IN | OXYGEN SATURATION: 98 % | RESPIRATION RATE: 16 BRPM | HEART RATE: 82 BPM | DIASTOLIC BLOOD PRESSURE: 87 MMHG | WEIGHT: 193.4 LBS | TEMPERATURE: 98.1 F | SYSTOLIC BLOOD PRESSURE: 137 MMHG

## 2020-02-04 DIAGNOSIS — R06.83 SNORING: ICD-10-CM

## 2020-02-04 DIAGNOSIS — E66.9 CLASS 2 OBESITY WITH BODY MASS INDEX (BMI) OF 38.0 TO 38.9 IN ADULT, UNSPECIFIED OBESITY TYPE, UNSPECIFIED WHETHER SERIOUS COMORBIDITY PRESENT: ICD-10-CM

## 2020-02-04 DIAGNOSIS — M79.603: ICD-10-CM

## 2020-02-04 DIAGNOSIS — R73.9 HYPERGLYCEMIA: ICD-10-CM

## 2020-02-04 DIAGNOSIS — E55.9 VITAMIN D DEFICIENCY: ICD-10-CM

## 2020-02-04 DIAGNOSIS — F41.8 MIXED ANXIETY DEPRESSIVE DISORDER: ICD-10-CM

## 2020-02-04 DIAGNOSIS — D75.839 THROMBOCYTOSIS: ICD-10-CM

## 2020-02-04 DIAGNOSIS — E53.8 B12 DEFICIENCY: Primary | ICD-10-CM

## 2020-02-04 DIAGNOSIS — E78.5 HYPERLIPIDEMIA, UNSPECIFIED HYPERLIPIDEMIA TYPE: ICD-10-CM

## 2020-02-04 DIAGNOSIS — R70.0 ELEVATED SED RATE: ICD-10-CM

## 2020-02-04 PROCEDURE — 99214 OFFICE O/P EST MOD 30 MIN: CPT | Performed by: PREVENTIVE MEDICINE

## 2020-02-04 NOTE — PROGRESS NOTES
"Subjective   Chrystal Walters is a 34 y.o. female presents for   Chief Complaint   Patient presents with   • Hyperlipidemia     not fasting   • Snoring     wants referral for sleep study        There are no preventive care reminders to display for this patient.    Patient has had mildly abnormal CBC, sed rate and complaints of stiffness/hand forearm pain.  Hematologist suggest bone marrow she did not agree and wants second opinion.  Snoring and sleep difficulties causing relationship issues with  and mood depressed. Will consider diet intervention as well.  Dad had some arthritis andnot sure if ever diagnosied.  Spent 25 minutes discussing how workup as been progressing and she agrees that need tofinish and lose weight.       Vitals:    02/04/20 0849 02/04/20 0854   BP: 135/85 137/87   BP Location: Right arm Left arm   Patient Position: Sitting Sitting   Cuff Size: Adult Adult   Pulse: 88 82   Resp: 16    Temp: 98.1 °F (36.7 °C)    TempSrc: Oral    SpO2: 98%    Weight: 87.7 kg (193 lb 6.4 oz)    Height: 157.5 cm (62\")      Body mass index is 35.37 kg/m².    Current Outpatient Medications on File Prior to Visit   Medication Sig Dispense Refill   • Cholecalciferol (VITAMIN D3) 1000 units capsule Take 4,000 Units by mouth Daily. Take four  gummies daily(1000 each)     • Cyanocobalamin ([facility] B12 GUMMIES) 500 MCG chewable tablet Chew 500 mg Daily.     • Multiple Vitamins-Minerals (ONE DAILY MULTIVITAMIN WOMEN) tablet Take 1 tablet by mouth Daily.     • Ferrous Sulfate (IRON) 325 (65 Fe) MG tablet Take 1 tablet by mouth Daily.     • [DISCONTINUED] [facility] ASPIRIN ADULT LOW DOSE PO Take 81 mg by mouth Daily.       No current facility-administered medications on file prior to visit.        The following portions of the patient's history were reviewed and updated as appropriate: allergies, current medications, past family history, past medical history, past social history, past surgical history and problem list.    Review " of Systems   Constitutional: Positive for fatigue and unexpected weight gain.   HENT: Negative.  Negative for sinus pressure and sore throat.    Eyes: Positive for visual disturbance.   Respiratory: Negative.  Negative for cough.    Cardiovascular: Negative.    Gastrointestinal: Negative.    Endocrine: Negative.    Genitourinary: Positive for frequency.   Musculoskeletal: Positive for arthralgias and myalgias.   Skin: Negative.    Allergic/Immunologic: Positive for environmental allergies.   Neurological: Positive for headache.   Hematological: Negative.    Psychiatric/Behavioral: Positive for dysphoric mood and sleep disturbance.       Objective   Physical Exam   Constitutional: She is oriented to person, place, and time. She appears well-developed.   obese   HENT:   Head: Normocephalic and atraumatic.   Eyes: Pupils are equal, round, and reactive to light. Conjunctivae and EOM are normal.   R pupil not working-due to trauma   Neck: Normal range of motion.   Cardiovascular: Normal rate and regular rhythm.   Pulmonary/Chest: Effort normal and breath sounds normal.   Abdominal: Soft. Bowel sounds are normal.   Musculoskeletal: Normal range of motion.   CTS tests to inclkude Tinnels, thumb abduction and sense normal.  No swellingorerythema   Lymphadenopathy:     She has no cervical adenopathy.   Neurological: She is alert and oriented to person, place, and time.   Skin: Skin is warm and dry.   Psychiatric:   Depression and frustration   Nursing note and vitals reviewed.    PHQ-9 Total Score: 20    Assessment/Plan   Chrystal was seen today for hyperlipidemia and snoring.    Diagnoses and all orders for this visit:    B12 deficiency  -     Vitamin B12    Body mass index (BMI) of 35.0-35.9 in adult    Hyperlipidemia, unspecified hyperlipidemia type  -     Comprehensive Metabolic Panel  -     Lipid Panel    Thrombocytosis (CMS/HCC)  -     Ambulatory Referral to Hematology / Oncology  -     CBC Auto Differential  -     JAK2  Mutation Analysis, Qual; Future  -     Sedimentation Rate; Future  -     TSH  -     Ferritin  -     Reticulocytes; Future  -     Iron and TIBC; Future    Vitamin D deficiency  -     Vitamin B12    Elevated sed rate    Arm pain, diffuse, unspecified laterality    Snoring  -     Ambulatory Referral to Sleep Medicine    Class 2 obesity with body mass index (BMI) of 38.0 to 38.9 in adult, unspecified obesity type, unspecified whether serious comorbidity present  -     Ambulatory Referral to Weight Management Program    Mixed anxiety depressive disorder    Hyperglycemia  -     Comprehensive Metabolic Panel  -     Hemoglobin A1c        Patient Instructions   Call min one week if [facility] [place] doesn't call you.  Hand xrays if sed rate.  12 hour fast for labs. elevated.Calorie Counting for Weight Loss  Calories are units of energy. Your body needs a certain amount of calories from food to keep you going throughout the day. When you eat more calories than your body needs, your body stores the extra calories as fat. When you eat fewer calories than your body needs, your body burns fat to get the energy it needs.  Calorie counting means keeping track of how many calories you eat and drink each day. Calorie counting can be helpful if you need to lose weight. If you make sure to eat fewer calories than your body needs, you should lose weight. Ask your health care provider what a healthy weight is for you.  For calorie counting to work, you will need to eat the right number of calories in a day in order to lose a healthy amount of weight per week. A dietitian can help you determine how many calories you need in a day and will give you suggestions on how to reach your calorie goal.  · A healthy amount of weight to lose per week is usually 1-2 lb (0.5-0.9 kg). This usually means that your daily calorie intake should be reduced by 500-750 calories.  · Eating 1,200 - 1,500 calories per day can help most women lose  weight.  · Eating 1,500 - 1,800 calories per day can help most men lose weight.  What is my plan?  My goal is to have __________ calories per day.  If I have this many calories per day, I should lose around __________ pounds per week.  What do I need to know about calorie counting?  In order to meet your daily calorie goal, you will need to:  · Find out how many calories are in each food you would like to eat. Try to do this before you eat.  · Decide how much of the food you plan to eat.  · Write down what you ate and how many calories it had. Doing this is called keeping a food log.  To successfully lose weight, it is important to balance calorie counting with a healthy lifestyle that includes regular activity. Aim for 150 minutes of moderate exercise (such as walking) or 75 minutes of vigorous exercise (such as running) each week.  Where do I find calorie information?      The number of calories in a food can be found on a Nutrition Facts label. If a food does not have a Nutrition Facts label, try to look up the calories online or ask your dietitian for help.  Remember that calories are listed per serving. If you choose to have more than one serving of a food, you will have to multiply the calories per serving by the amount of servings you plan to eat. For example, the label on a package of bread might say that a serving size is 1 slice and that there are 90 calories in a serving. If you eat 1 slice, you will have eaten 90 calories. If you eat 2 slices, you will have eaten 180 calories.  How do I keep a food log?  Immediately after each meal, record the following information in your food log:  · What you ate. Don't forget to include toppings, sauces, and other extras on the food.  · How much you ate. This can be measured in cups, ounces, or number of items.  · How many calories each food and drink had.  · The total number of calories in the meal.  Keep your food log near you, such as in a small notebook in your  "pocket, or use a mobile madhavi or website. Some programs will calculate calories for you and show you how many calories you have left for the day to meet your goal.  What are some calorie counting tips?      · Use your calories on foods and drinks that will fill you up and not leave you hungry:  ? Some examples of foods that fill you up are nuts and nut butters, vegetables, lean proteins, and high-fiber foods like whole grains. High-fiber foods are foods with more than 5 g fiber per serving.  ? Drinks such as sodas, specialty coffee drinks, alcohol, and juices have a lot of calories, yet do not fill you up.  · Eat nutritious foods and avoid empty calories. Empty calories are calories you get from foods or beverages that do not have many vitamins or protein, such as candy, sweets, and soda. It is better to have a nutritious high-calorie food (such as an avocado) than a food with few nutrients (such as a bag of chips).  · Know how many calories are in the foods you eat most often. This will help you calculate calorie counts faster.  · Pay attention to calories in drinks. Low-calorie drinks include water and unsweetened drinks.  · Pay attention to nutrition labels for \"low fat\" or \"fat free\" foods. These foods sometimes have the same amount of calories or more calories than the full fat versions. They also often have added sugar, starch, or salt, to make up for flavor that was removed with the fat.  · Find a way of tracking calories that works for you. Get creative. Try different apps or programs if writing down calories does not work for you.  What are some portion control tips?  · Know how many calories are in a serving. This will help you know how many servings of a certain food you can have.  · Use a measuring cup to measure serving sizes. You could also try weighing out portions on a kitchen scale. With time, you will be able to estimate serving sizes for some foods.  · Take some time to put servings of different " foods on your favorite plates, bowls, and cups so you know what a serving looks like.  · Try not to eat straight from a bag or box. Doing this can lead to overeating. Put the amount you would like to eat in a cup or on a plate to make sure you are eating the right portion.  · Use smaller plates, glasses, and bowls to prevent overeating.  · Try not to multitask (for example, watch TV or use your computer) while eating. If it is time to eat, sit down at a table and enjoy your food. This will help you to know when you are full. It will also help you to be aware of what you are eating and how much you are eating.  What are tips for following this plan?  Reading food labels  · Check the calorie count compared to the serving size. The serving size may be smaller than what you are used to eating.  · Check the source of the calories. Make sure the food you are eating is high in vitamins and protein and low in saturated and trans fats.  Shopping  · Read nutrition labels while you shop. This will help you make healthy decisions before you decide to purchase your food.  · Make a grocery list and stick to it.  Cooking  · Try to cook your favorite foods in a healthier way. For example, try baking instead of frying.  · Use low-fat dairy products.  Meal planning  · Use more fruits and vegetables. Half of your plate should be fruits and vegetables.  · Include lean proteins like poultry and fish.  How do I count calories when eating out?  · Ask for smaller portion sizes.  · Consider sharing an entree and sides instead of getting your own entree.  · If you get your own entree, eat only half. Ask for a box at the beginning of your meal and put the rest of your entree in it so you are not tempted to eat it.  · If calories are listed on the menu, choose the lower calorie options.  · Choose dishes that include vegetables, fruits, whole grains, low-fat dairy products, and lean protein.  · Choose items that are boiled, broiled, grilled, or  "steamed. Stay away from items that are buttered, battered, fried, or served with cream sauce. Items labeled \"crispy\" are usually fried, unless stated otherwise.  · Choose water, low-fat milk, unsweetened iced tea, or other drinks without added sugar. If you want an alcoholic beverage, choose a lower calorie option such as a glass of wine or light beer.  · Ask for dressings, sauces, and syrups on the side. These are usually high in calories, so you should limit the amount you eat.  · If you want a salad, choose a garden salad and ask for grilled meats. Avoid extra toppings like cano, cheese, or fried items. Ask for the dressing on the side, or ask for olive oil and vinegar or lemon to use as dressing.  · Estimate how many servings of a food you are given. For example, a serving of cooked rice is ½ cup or about the size of half a baseball. Knowing serving sizes will help you be aware of how much food you are eating at restaurants. The list below tells you how big or small some common portion sizes are based on everyday objects:  ? 1 oz--4 stacked dice.  ? 3 oz--1 deck of cards.  ? 1 tsp--1 die.  ? 1 Tbsp--½ a ping-pong ball.  ? 2 Tbsp--1 ping-pong ball.  ? ½ cup--½ baseball.  ? 1 cup--1 baseball.  Summary  · Calorie counting means keeping track of how many calories you eat and drink each day. If you eat fewer calories than your body needs, you should lose weight.  · A healthy amount of weight to lose per week is usually 1-2 lb (0.5-0.9 kg). This usually means reducing your daily calorie intake by 500-750 calories.  · The number of calories in a food can be found on a Nutrition Facts label. If a food does not have a Nutrition Facts label, try to look up the calories online or ask your dietitian for help.  · Use your calories on foods and drinks that will fill you up, and not on foods and drinks that will leave you hungry.  · Use smaller plates, glasses, and bowls to prevent overeating.  This information is not " intended to replace advice given to you by your health care provider. Make sure you discuss any questions you have with your health care provider.  Document Released: 12/18/2006 Document Revised: 09/06/2019 Document Reviewed: 11/17/2017  [employer] Interactive Patient Education © 2019 [employer]

## 2020-02-04 NOTE — PATIENT INSTRUCTIONS
Call min one week if [facility] [place] doesn't call you.  Hand xrays if sed rate.  12 hour fast for labs. elevated.Calorie Counting for Weight Loss  Calories are units of energy. Your body needs a certain amount of calories from food to keep you going throughout the day. When you eat more calories than your body needs, your body stores the extra calories as fat. When you eat fewer calories than your body needs, your body burns fat to get the energy it needs.  Calorie counting means keeping track of how many calories you eat and drink each day. Calorie counting can be helpful if you need to lose weight. If you make sure to eat fewer calories than your body needs, you should lose weight. Ask your health care provider what a healthy weight is for you.  For calorie counting to work, you will need to eat the right number of calories in a day in order to lose a healthy amount of weight per week. A dietitian can help you determine how many calories you need in a day and will give you suggestions on how to reach your calorie goal.  · A healthy amount of weight to lose per week is usually 1-2 lb (0.5-0.9 kg). This usually means that your daily calorie intake should be reduced by 500-750 calories.  · Eating 1,200 - 1,500 calories per day can help most women lose weight.  · Eating 1,500 - 1,800 calories per day can help most men lose weight.  What is my plan?  My goal is to have __________ calories per day.  If I have this many calories per day, I should lose around __________ pounds per week.  What do I need to know about calorie counting?  In order to meet your daily calorie goal, you will need to:  · Find out how many calories are in each food you would like to eat. Try to do this before you eat.  · Decide how much of the food you plan to eat.  · Write down what you ate and how many calories it had. Doing this is called keeping a food log.  To successfully lose weight, it is important to balance calorie counting with a  healthy lifestyle that includes regular activity. Aim for 150 minutes of moderate exercise (such as walking) or 75 minutes of vigorous exercise (such as running) each week.  Where do I find calorie information?      The number of calories in a food can be found on a Nutrition Facts label. If a food does not have a Nutrition Facts label, try to look up the calories online or ask your dietitian for help.  Remember that calories are listed per serving. If you choose to have more than one serving of a food, you will have to multiply the calories per serving by the amount of servings you plan to eat. For example, the label on a package of bread might say that a serving size is 1 slice and that there are 90 calories in a serving. If you eat 1 slice, you will have eaten 90 calories. If you eat 2 slices, you will have eaten 180 calories.  How do I keep a food log?  Immediately after each meal, record the following information in your food log:  · What you ate. Don't forget to include toppings, sauces, and other extras on the food.  · How much you ate. This can be measured in cups, ounces, or number of items.  · How many calories each food and drink had.  · The total number of calories in the meal.  Keep your food log near you, such as in a small notebook in your pocket, or use a mobile madhavi or website. Some programs will calculate calories for you and show you how many calories you have left for the day to meet your goal.  What are some calorie counting tips?      · Use your calories on foods and drinks that will fill you up and not leave you hungry:  ? Some examples of foods that fill you up are nuts and nut butters, vegetables, lean proteins, and high-fiber foods like whole grains. High-fiber foods are foods with more than 5 g fiber per serving.  ? Drinks such as sodas, specialty coffee drinks, alcohol, and juices have a lot of calories, yet do not fill you up.  · Eat nutritious foods and avoid empty calories. Empty  "calories are calories you get from foods or beverages that do not have many vitamins or protein, such as candy, sweets, and soda. It is better to have a nutritious high-calorie food (such as an avocado) than a food with few nutrients (such as a bag of chips).  · Know how many calories are in the foods you eat most often. This will help you calculate calorie counts faster.  · Pay attention to calories in drinks. Low-calorie drinks include water and unsweetened drinks.  · Pay attention to nutrition labels for \"low fat\" or \"fat free\" foods. These foods sometimes have the same amount of calories or more calories than the full fat versions. They also often have added sugar, starch, or salt, to make up for flavor that was removed with the fat.  · Find a way of tracking calories that works for you. Get creative. Try different apps or programs if writing down calories does not work for you.  What are some portion control tips?  · Know how many calories are in a serving. This will help you know how many servings of a certain food you can have.  · Use a measuring cup to measure serving sizes. You could also try weighing out portions on a kitchen scale. With time, you will be able to estimate serving sizes for some foods.  · Take some time to put servings of different foods on your favorite plates, bowls, and cups so you know what a serving looks like.  · Try not to eat straight from a bag or box. Doing this can lead to overeating. Put the amount you would like to eat in a cup or on a plate to make sure you are eating the right portion.  · Use smaller plates, glasses, and bowls to prevent overeating.  · Try not to multitask (for example, watch TV or use your computer) while eating. If it is time to eat, sit down at a table and enjoy your food. This will help you to know when you are full. It will also help you to be aware of what you are eating and how much you are eating.  What are tips for following this plan?  Reading food " "labels  · Check the calorie count compared to the serving size. The serving size may be smaller than what you are used to eating.  · Check the source of the calories. Make sure the food you are eating is high in vitamins and protein and low in saturated and trans fats.  Shopping  · Read nutrition labels while you shop. This will help you make healthy decisions before you decide to purchase your food.  · Make a grocery list and stick to it.  Cooking  · Try to cook your favorite foods in a healthier way. For example, try baking instead of frying.  · Use low-fat dairy products.  Meal planning  · Use more fruits and vegetables. Half of your plate should be fruits and vegetables.  · Include lean proteins like poultry and fish.  How do I count calories when eating out?  · Ask for smaller portion sizes.  · Consider sharing an entree and sides instead of getting your own entree.  · If you get your own entree, eat only half. Ask for a box at the beginning of your meal and put the rest of your entree in it so you are not tempted to eat it.  · If calories are listed on the menu, choose the lower calorie options.  · Choose dishes that include vegetables, fruits, whole grains, low-fat dairy products, and lean protein.  · Choose items that are boiled, broiled, grilled, or steamed. Stay away from items that are buttered, battered, fried, or served with cream sauce. Items labeled \"crispy\" are usually fried, unless stated otherwise.  · Choose water, low-fat milk, unsweetened iced tea, or other drinks without added sugar. If you want an alcoholic beverage, choose a lower calorie option such as a glass of wine or light beer.  · Ask for dressings, sauces, and syrups on the side. These are usually high in calories, so you should limit the amount you eat.  · If you want a salad, choose a garden salad and ask for grilled meats. Avoid extra toppings like cano, cheese, or fried items. Ask for the dressing on the side, or ask for olive oil " and vinegar or lemon to use as dressing.  · Estimate how many servings of a food you are given. For example, a serving of cooked rice is ½ cup or about the size of half a baseball. Knowing serving sizes will help you be aware of how much food you are eating at restaurants. The list below tells you how big or small some common portion sizes are based on everyday objects:  ? 1 oz--4 stacked dice.  ? 3 oz--1 deck of cards.  ? 1 tsp--1 die.  ? 1 Tbsp--½ a ping-pong ball.  ? 2 Tbsp--1 ping-pong ball.  ? ½ cup--½ baseball.  ? 1 cup--1 baseball.  Summary  · Calorie counting means keeping track of how many calories you eat and drink each day. If you eat fewer calories than your body needs, you should lose weight.  · A healthy amount of weight to lose per week is usually 1-2 lb (0.5-0.9 kg). This usually means reducing your daily calorie intake by 500-750 calories.  · The number of calories in a food can be found on a Nutrition Facts label. If a food does not have a Nutrition Facts label, try to look up the calories online or ask your dietitian for help.  · Use your calories on foods and drinks that will fill you up, and not on foods and drinks that will leave you hungry.  · Use smaller plates, glasses, and bowls to prevent overeating.  This information is not intended to replace advice given to you by your health care provider. Make sure you discuss any questions you have with your health care provider.  Document Released: 12/18/2006 Document Revised: 09/06/2019 Document Reviewed: 11/17/2017  [employer] Interactive Patient Education © 2019 [employer]

## 2020-02-05 ENCOUNTER — CLINICAL SUPPORT (OUTPATIENT)
Dept: FAMILY MEDICINE CLINIC | Facility: CLINIC | Age: 35
End: 2020-02-05

## 2020-02-05 ENCOUNTER — OFFICE VISIT (OUTPATIENT)
Dept: NEUROLOGY | Facility: CLINIC | Age: 35
End: 2020-02-05

## 2020-02-05 VITALS
BODY MASS INDEX: 36.14 KG/M2 | HEIGHT: 62 IN | HEART RATE: 76 BPM | SYSTOLIC BLOOD PRESSURE: 102 MMHG | WEIGHT: 196.4 LBS | DIASTOLIC BLOOD PRESSURE: 69 MMHG

## 2020-02-05 DIAGNOSIS — E66.9 CLASS 2 OBESITY WITH BODY MASS INDEX (BMI) OF 38.0 TO 38.9 IN ADULT, UNSPECIFIED OBESITY TYPE, UNSPECIFIED WHETHER SERIOUS COMORBIDITY PRESENT: ICD-10-CM

## 2020-02-05 DIAGNOSIS — G47.33 OBSTRUCTIVE SLEEP APNEA: Primary | ICD-10-CM

## 2020-02-05 DIAGNOSIS — D75.839 THROMBOCYTOSIS: ICD-10-CM

## 2020-02-05 LAB
ALBUMIN SERPL-MCNC: 4.5 G/DL (ref 3.5–5.2)
ALBUMIN/GLOB SERPL: 1.5 G/DL
ALP SERPL-CCNC: 61 U/L (ref 39–117)
ALT SERPL W P-5'-P-CCNC: 31 U/L (ref 1–33)
ANION GAP SERPL CALCULATED.3IONS-SCNC: 14 MMOL/L (ref 5–15)
AST SERPL-CCNC: 21 U/L (ref 1–32)
BASOPHILS # BLD AUTO: 0.03 10*3/MM3 (ref 0–0.2)
BASOPHILS NFR BLD AUTO: 0.6 % (ref 0–1.5)
BILIRUB SERPL-MCNC: 0.5 MG/DL (ref 0.2–1.2)
BUN BLD-MCNC: 11 MG/DL (ref 6–20)
BUN/CREAT SERPL: 16.7 (ref 7–25)
CALCIUM SPEC-SCNC: 9.5 MG/DL (ref 8.6–10.5)
CHLORIDE SERPL-SCNC: 100 MMOL/L (ref 98–107)
CHOLEST SERPL-MCNC: 213 MG/DL (ref 0–200)
CO2 SERPL-SCNC: 25 MMOL/L (ref 22–29)
CREAT BLD-MCNC: 0.66 MG/DL (ref 0.57–1)
DEPRECATED RDW RBC AUTO: 40.9 FL (ref 37–54)
EOSINOPHIL # BLD AUTO: 0.06 10*3/MM3 (ref 0–0.4)
EOSINOPHIL NFR BLD AUTO: 1.3 % (ref 0.3–6.2)
ERYTHROCYTE [DISTWIDTH] IN BLOOD BY AUTOMATED COUNT: 11.8 % (ref 12.3–15.4)
ERYTHROCYTE [SEDIMENTATION RATE] IN BLOOD: 43 MM/HR (ref 0–20)
FERRITIN SERPL-MCNC: 217 NG/ML (ref 13–150)
GFR SERPL CREATININE-BSD FRML MDRD: 103 ML/MIN/1.73
GLOBULIN UR ELPH-MCNC: 3 GM/DL
GLUCOSE BLD-MCNC: 98 MG/DL (ref 65–99)
HBA1C MFR BLD: 4.8 % (ref 3.5–5.6)
HCT VFR BLD AUTO: 36.7 % (ref 34–46.6)
HDLC SERPL-MCNC: 48 MG/DL (ref 40–60)
HGB BLD-MCNC: 12.8 G/DL (ref 12–15.9)
IMM GRANULOCYTES # BLD AUTO: 0.01 10*3/MM3 (ref 0–0.05)
IMM GRANULOCYTES NFR BLD AUTO: 0.2 % (ref 0–0.5)
IRON 24H UR-MRATE: 154 MCG/DL (ref 37–145)
IRON SATN MFR SERPL: 42 % (ref 20–50)
LDLC SERPL CALC-MCNC: 127 MG/DL (ref 0–100)
LDLC/HDLC SERPL: 2.65 {RATIO}
LYMPHOCYTES # BLD AUTO: 1.74 10*3/MM3 (ref 0.7–3.1)
LYMPHOCYTES NFR BLD AUTO: 36.3 % (ref 19.6–45.3)
MCH RBC QN AUTO: 32.5 PG (ref 26.6–33)
MCHC RBC AUTO-ENTMCNC: 34.9 G/DL (ref 31.5–35.7)
MCV RBC AUTO: 93.1 FL (ref 79–97)
MONOCYTES # BLD AUTO: 0.38 10*3/MM3 (ref 0.1–0.9)
MONOCYTES NFR BLD AUTO: 7.9 % (ref 5–12)
NEUTROPHILS # BLD AUTO: 2.57 10*3/MM3 (ref 1.7–7)
NEUTROPHILS NFR BLD AUTO: 53.7 % (ref 42.7–76)
NRBC BLD AUTO-RTO: 0 /100 WBC (ref 0–0.2)
PLATELET # BLD AUTO: 477 10*3/MM3 (ref 140–450)
PMV BLD AUTO: 9 FL (ref 6–12)
POTASSIUM BLD-SCNC: 4.1 MMOL/L (ref 3.5–5.2)
PROT SERPL-MCNC: 7.5 G/DL (ref 6–8.5)
RBC # BLD AUTO: 3.94 10*6/MM3 (ref 3.77–5.28)
RETICS # AUTO: 0.1 10*6/MM3 (ref 0.02–0.13)
RETICS/RBC NFR AUTO: 2.46 % (ref 0.7–1.9)
SODIUM BLD-SCNC: 139 MMOL/L (ref 136–145)
TIBC SERPL-MCNC: 370 MCG/DL (ref 298–536)
TRANSFERRIN SERPL-MCNC: 248 MG/DL (ref 200–360)
TRIGL SERPL-MCNC: 190 MG/DL (ref 0–150)
TSH SERPL DL<=0.05 MIU/L-ACNC: 0.88 UIU/ML (ref 0.27–4.2)
VIT B12 BLD-MCNC: 678 PG/ML (ref 211–946)
VLDLC SERPL-MCNC: 38 MG/DL (ref 5–40)
WBC NRBC COR # BLD: 4.79 10*3/MM3 (ref 3.4–10.8)

## 2020-02-05 PROCEDURE — 85652 RBC SED RATE AUTOMATED: CPT | Performed by: PREVENTIVE MEDICINE

## 2020-02-05 PROCEDURE — 82728 ASSAY OF FERRITIN: CPT | Performed by: PREVENTIVE MEDICINE

## 2020-02-05 PROCEDURE — 85045 AUTOMATED RETICULOCYTE COUNT: CPT | Performed by: PREVENTIVE MEDICINE

## 2020-02-05 PROCEDURE — 82607 VITAMIN B-12: CPT | Performed by: PREVENTIVE MEDICINE

## 2020-02-05 PROCEDURE — 81270 JAK2 GENE: CPT | Performed by: PREVENTIVE MEDICINE

## 2020-02-05 PROCEDURE — 36415 COLL VENOUS BLD VENIPUNCTURE: CPT | Performed by: PREVENTIVE MEDICINE

## 2020-02-05 PROCEDURE — 85025 COMPLETE CBC W/AUTO DIFF WBC: CPT | Performed by: PREVENTIVE MEDICINE

## 2020-02-05 PROCEDURE — 83036 HEMOGLOBIN GLYCOSYLATED A1C: CPT | Performed by: PREVENTIVE MEDICINE

## 2020-02-05 PROCEDURE — 80053 COMPREHEN METABOLIC PANEL: CPT | Performed by: PREVENTIVE MEDICINE

## 2020-02-05 PROCEDURE — 84443 ASSAY THYROID STIM HORMONE: CPT | Performed by: PREVENTIVE MEDICINE

## 2020-02-05 PROCEDURE — 99244 OFF/OP CNSLTJ NEW/EST MOD 40: CPT | Performed by: PSYCHIATRY & NEUROLOGY

## 2020-02-05 PROCEDURE — 83540 ASSAY OF IRON: CPT | Performed by: PREVENTIVE MEDICINE

## 2020-02-05 PROCEDURE — 84466 ASSAY OF TRANSFERRIN: CPT | Performed by: PREVENTIVE MEDICINE

## 2020-02-05 PROCEDURE — 80061 LIPID PANEL: CPT | Performed by: PREVENTIVE MEDICINE

## 2020-02-05 NOTE — PROGRESS NOTES
Sleep Medicine initial Consultation    Chrystal Walters  : 1985  34 y.o. female   Date of Service: 2020  Referring provider: Belinda Smith MD    Chief complaint:  Snoring and morning headaches.     New sleep referred by PCP, neck measures 15 1/2 inches.    Mrs. Chrystal Walters  is a 34 y.o. right handed  female patient  is here for the evaluation of Snoring, Excessive Daytime Sleepiness, Chronic Fatigue, Restless Leg Syndrome, Insomnia and Disturbed Sleep.      Patient having issues with fatigue and snoring wakes up a couple of times to use the bathroom and has 60 pounds of weight gain in the past 3 years. Patient father has sleep apnea but doesn't sleep with a pap machine.     The patient c/o daytime sleepiness issues:  excessive daytime sleepiness, , chronic fatigue, difficulty driving to to sleepiness      There is no history of hypnagogic hallucinations, sleep paralysis or cataplexy.    The patient complains of snoring loud in all sleeping positions, wakened coughing, choking, or respiratory discomfort, has morning headaches, has dry mouth or sore mouth when he wakes up, excessive sweating during sleep and has gained 60 lbs of weight the the last 5 years.  Headache present when awakens, resolves in several hours.    The patient complains of Leg symptoms: leg jerking during sleep.     The patient complains of problems with insomnia:  frequent awakenings 2-3, has restless sleep, Does not feel rested even after a long sleep and Still feels sleepy even when increasing sleep time.    The patient reports history of these childhood sleep problems: There is no h/O sleepwalking or bedwetting or nightmares or sleep eating or acting out dreams    Sleep schedule: Bedtime:10-11 , gets out of bed at 7, sleep latency: 10-30 minutes, Gets about 7-8 hours of sleep.      EPWORTH SLEEPINESS SCALE  Sitting and reading  3  WatchingTV  3  Sitting, inactive, in a public place  3  As a passenger in a car for  1 hour w/o a break  3  Lying down to rest in the afternoon  3  Sitting and talking to someone  3  Sitting quietly after a lunch  3  In a car, while stopped for traffic or a light  1  Total .  Past Medical History:   Diagnosis Date   • B12 deficiency    • Low back pain    • Obesity    • Thrombocytosis (CMS/HCC)    • Vitamin D deficiency      Past Surgical History:   Procedure Laterality Date   •  SECTION     • CYSTOSCOPY     • EYE SURGERY     • HYSTERECTOMY       Current Outpatient Medications on File Prior to Visit   Medication Sig Dispense Refill   • Cholecalciferol (VITAMIN D3) 1000 units capsule Take 4,000 Units by mouth Daily. Take four  gummies daily(1000 each)     • Cyanocobalamin ([employer] B12 GUMMIES) 500 MCG chewable tablet Chew 500 mg Daily.     • Multiple Vitamins-Minerals (ONE DAILY MULTIVITAMIN WOMEN) tablet Take 1 tablet by mouth Daily.       No current facility-administered medications on file prior to visit.      No Known Allergies  Family History   Problem Relation Age of Onset   • Alcohol abuse Father    • Arthritis Father    • Diabetes Father    • Hyperlipidemia Father    • Anxiety disorder Mother      Social History     Socioeconomic History   • Marital status:      Spouse name: Not on file   • Number of children: Not on file   • Years of education: Not on file   • Highest education level: Not on file   Tobacco Use   • Smoking status: Never Smoker   • Smokeless tobacco: Never Used   Substance and Sexual Activity   • Alcohol use: No   • Drug use: No   • Sexual activity: Yes     Partners: Male     Birth control/protection: None     Review of Systems   Constitutional: Positive for fatigue.   HENT: Positive for postnasal drip, sinus pressure and sinus pain.    Eyes: Positive for visual disturbance. Negative for redness and itching.   Respiratory: Negative for cough and shortness of breath.    Cardiovascular: Negative for chest pain and palpitations.   Gastrointestinal:  Negative for constipation and diarrhea.   Endocrine: Negative for cold intolerance and heat intolerance.   Genitourinary: Positive for frequency. Negative for hematuria.   Musculoskeletal: Negative for back pain and neck pain.   Allergic/Immunologic: Positive for environmental allergies.   Neurological: Positive for headaches. Negative for dizziness, tremors, seizures, syncope, facial asymmetry, speech difficulty, weakness, light-headedness and numbness.   Psychiatric/Behavioral: Negative for agitation and confusion.       I reviewed and addressed ROS entered by MA.    Patient examination:  Vitals:    02/05/20 1723   BP: 102/69   Pulse: 76    Body mass index is 35.92 kg/m².     Physical Exam   Constitutional: She is oriented to person, place, and time. She appears well-developed and well-nourished.   HENT:   Nose: Nose normal.   Mouth/Throat: Oropharynx is clear and moist.   Eyes: Pupils are equal, round, and reactive to light. Conjunctivae and EOM are normal.   Cardiovascular: Normal rate and regular rhythm.   Pulmonary/Chest: Effort normal and breath sounds normal.   Musculoskeletal: Normal range of motion. She exhibits no edema or deformity.   Neurological: She is alert and oriented to person, place, and time. No cranial nerve deficit.   Psychiatric: She has a normal mood and affect. Her behavior is normal.   Vitals reviewed.      ASSESSMENT AND PLAN:    The patient has symptoms of obstructive sleep apnea syndrome with hypersomnia.   We will proceed with polysomnography and treat with CPAP therapy if needed.       Encounter Diagnoses   Name Primary?   • Obstructive sleep apnea Yes   • Class 2 obesity with body mass index (BMI) of 38.0 to 38.9 in adult, unspecified obesity type, unspecified whether serious comorbidity present        Orders Placed This Encounter   Procedures   • Polysomnography 4 or More Parameters       Return in about 3 months (around 5/5/2020) for Recheck.    This document has been  electronically signed by Joseph Seipel, MD  on February 5, 2020 6:01 PM

## 2020-02-06 DIAGNOSIS — M19.90 ARTHRITIS: Primary | ICD-10-CM

## 2020-02-10 ENCOUNTER — PATIENT MESSAGE (OUTPATIENT)
Dept: FAMILY MEDICINE CLINIC | Facility: CLINIC | Age: 35
End: 2020-02-10

## 2020-02-10 LAB
JAK2 P.V617F BLD/T QL: NORMAL
LAB DIRECTOR NAME PROVIDER: NORMAL
LABORATORY COMMENT REPORT: NORMAL

## 2020-02-11 ENCOUNTER — HOSPITAL ENCOUNTER (OUTPATIENT)
Dept: SLEEP MEDICINE | Facility: HOSPITAL | Age: 35
Discharge: HOME | End: 2020-02-11
Admitting: PSYCHIATRY & NEUROLOGY

## 2020-02-11 DIAGNOSIS — G47.33 OBSTRUCTIVE SLEEP APNEA: ICD-10-CM

## 2020-02-11 PROCEDURE — 95810 POLYSOM 6/> YRS 4/> PARAM: CPT

## 2020-02-12 ENCOUNTER — HOSPITAL ENCOUNTER (OUTPATIENT)
Dept: GENERAL RADIOLOGY | Facility: HOSPITAL | Age: 35
Discharge: HOME | End: 2020-02-12
Admitting: PREVENTIVE MEDICINE

## 2020-02-12 ENCOUNTER — HOSPITAL ENCOUNTER (OUTPATIENT)
Dept: GENERAL RADIOLOGY | Facility: HOSPITAL | Age: 35
Discharge: HOME | End: 2020-02-12

## 2020-02-12 VITALS — BODY MASS INDEX: 36.15 KG/M2 | HEIGHT: 62 IN | WEIGHT: 196.43 LBS

## 2020-02-12 DIAGNOSIS — M19.90 ARTHRITIS: ICD-10-CM

## 2020-02-12 PROCEDURE — 73130 X-RAY EXAM OF HAND: CPT

## 2020-02-19 DIAGNOSIS — G47.33 OBSTRUCTIVE SLEEP APNEA: Primary | ICD-10-CM

## 2020-02-19 PROCEDURE — 95810 POLYSOM 6/> YRS 4/> PARAM: CPT | Performed by: PSYCHIATRY & NEUROLOGY

## 2020-02-24 ENCOUNTER — OFFICE VISIT (OUTPATIENT)
Dept: FAMILY MEDICINE CLINIC | Facility: CLINIC | Age: 35
End: 2020-02-24

## 2020-02-24 VITALS
OXYGEN SATURATION: 99 % | HEIGHT: 62 IN | WEIGHT: 194.4 LBS | SYSTOLIC BLOOD PRESSURE: 117 MMHG | BODY MASS INDEX: 35.77 KG/M2 | DIASTOLIC BLOOD PRESSURE: 84 MMHG | HEART RATE: 76 BPM | RESPIRATION RATE: 16 BRPM | TEMPERATURE: 98.1 F

## 2020-02-24 DIAGNOSIS — H57.11 EYE PAIN, RIGHT: Primary | ICD-10-CM

## 2020-02-24 PROCEDURE — 99213 OFFICE O/P EST LOW 20 MIN: CPT | Performed by: NURSE PRACTITIONER

## 2020-02-24 NOTE — PROGRESS NOTES
"Subjective   Chrystal Walters is a 34 y.o. female presents for   Chief Complaint   Patient presents with   • Conjunctivitis       There are no preventive care reminders to display for this patient.    History of Present Illness   Pt reports waking with red, painful right eye.  Pt has history of right eye trauma and limited vision since the age of 11.  She has also had multiple surgeries in the eye and states the pain is similar to her prior post op pain.  Pt denies crusting in eye, but reports tearing since waking.  Pt also reports photosensitivity. Pt has used complete eye relief drops (OTC) without relief of symptoms.     Vitals:    02/24/20 1141 02/24/20 1144   BP: 114/82 117/84   BP Location: Right arm Left arm   Patient Position: Sitting Sitting   Cuff Size: Large Adult Large Adult   Pulse: 73 76   Resp: 16    Temp: 98.1 °F (36.7 °C)    TempSrc: Oral    SpO2: 99%    Weight: 88.2 kg (194 lb 6.4 oz)    Height: 157.5 cm (62\")      Body mass index is 35.56 kg/m².    Current Outpatient Medications on File Prior to Visit   Medication Sig Dispense Refill   • Cholecalciferol (VITAMIN D3) 1000 units capsule Take 4,000 Units by mouth Daily. Take four  gummies daily(1000 each)     • Cyanocobalamin ([employer] B12 GUMMIES) 500 MCG chewable tablet Chew 500 mg Daily.     • Multiple Vitamins-Minerals (ONE DAILY MULTIVITAMIN WOMEN) tablet Take 1 tablet by mouth Daily.       No current facility-administered medications on file prior to visit.        The following portions of the patient's history were reviewed and updated as appropriate: allergies, current medications, past family history, past medical history, past social history, past surgical history and problem list.    Review of Systems   Constitutional: Negative for chills and fever.   HENT: Negative for sinus pressure and sore throat.    Eyes: Positive for photophobia and pain. Negative for blurred vision.   Respiratory: Negative for cough and shortness of breath.  "   Cardiovascular: Negative for chest pain.   Gastrointestinal: Negative for abdominal pain.   Musculoskeletal: Negative for arthralgias and joint swelling.   Skin: Negative for color change.   Neurological: Negative for dizziness.   Psychiatric/Behavioral: Negative for behavioral problems.       Objective   Physical Exam   Constitutional: She is oriented to person, place, and time. She appears well-developed and well-nourished.   HENT:   Head: Normocephalic and atraumatic.   Right Ear: External ear normal.   Left Ear: External ear normal.   Nose: Nose normal.   Eyes: EOM and lids are normal. Lids are everted and swept, no foreign bodies found. Right eye exhibits discharge (tearing). Right pupil is not round (due to history of trauma).       No evidence of corneal abrasion on exam   Neck: Normal range of motion.   Cardiovascular: Normal rate.   Musculoskeletal: Normal range of motion.   Neurological: She is alert and oriented to person, place, and time.   Skin: Skin is warm and dry.   Psychiatric: She has a normal mood and affect. Her behavior is normal. Judgment normal.   Nursing note and vitals reviewed.    PHQ-9 Total Score:      Assessment/Plan   Chrystal was seen today for conjunctivitis.    Diagnoses and all orders for this visit:    Eye pain, right  Comments:  referred to opthalmologist today  Orders:  -     Ambulatory Referral to Ophthalmology        There are no Patient Instructions on file for this visit.

## 2020-02-26 ENCOUNTER — APPOINTMENT (OUTPATIENT)
Dept: SLEEP MEDICINE | Facility: HOSPITAL | Age: 35
End: 2020-02-26

## 2020-02-26 ENCOUNTER — TELEPHONE (OUTPATIENT)
Dept: FAMILY MEDICINE CLINIC | Facility: CLINIC | Age: 35
End: 2020-02-26

## 2020-02-26 NOTE — TELEPHONE ENCOUNTER
----- Message from TAMIE Cummins sent at 2/26/2020  3:56 PM EST -----      ----- Message -----  From: Yesenia Bautista  Sent: 2/26/2020   3:14 PM EST  To: TAMIE Cummins

## 2020-02-26 NOTE — TELEPHONE ENCOUNTER
Noted.  Will review those notes when we receive them and determine if further treatment is indicated.

## 2020-02-26 NOTE — TELEPHONE ENCOUNTER
PATIENT CALLED BACK STATING EYE PAIN. SHE WAS SEEN HERE Monday, WE GOT HER IN TO EYE DR IN [place]. THEY SEEN HER FOR A FU AND SENT HER ON THE [place] OFFICE TO BE SEEN.    THEY DIDN'T WRITE HER ANYTHING FOR PAIN. SHE SAID THEY CALLED BACK UP THERE AND TOLD HER  TO CHECCK WITH PRIMARY CARE DR.     I CALLED FAUSTINA MATTA AND MERLE FOR OV NOTES.

## 2020-02-26 NOTE — PROGRESS NOTES
Patient will need to continue to follow up with ophthalmology for ongoing pain.  They have documented that her condition is worsening and for her benefit, need to have them managing all symptoms or changes with her eye.

## 2020-03-06 ENCOUNTER — APPOINTMENT (OUTPATIENT)
Dept: LAB | Facility: HOSPITAL | Age: 35
End: 2020-03-06

## 2020-03-13 ENCOUNTER — HOSPITAL ENCOUNTER (OUTPATIENT)
Dept: SLEEP MEDICINE | Facility: HOSPITAL | Age: 35
Discharge: HOME | End: 2020-03-13
Admitting: PSYCHIATRY & NEUROLOGY

## 2020-03-13 VITALS — BODY MASS INDEX: 35.78 KG/M2 | WEIGHT: 194.45 LBS | HEIGHT: 62 IN

## 2020-03-13 DIAGNOSIS — G47.33 OBSTRUCTIVE SLEEP APNEA: ICD-10-CM

## 2020-03-13 PROCEDURE — 95811 POLYSOM 6/>YRS CPAP 4/> PARM: CPT

## 2020-03-16 PROCEDURE — 95811 POLYSOM 6/>YRS CPAP 4/> PARM: CPT | Performed by: PSYCHIATRY & NEUROLOGY

## 2020-03-20 ENCOUNTER — TELEPHONE (OUTPATIENT)
Dept: NEUROLOGY | Facility: CLINIC | Age: 35
End: 2020-03-20

## 2020-03-20 DIAGNOSIS — G47.33 OBSTRUCTIVE SLEEP APNEA: Primary | ICD-10-CM

## 2020-03-30 ENCOUNTER — TELEPHONE (OUTPATIENT)
Dept: ONCOLOGY | Facility: CLINIC | Age: 35
End: 2020-03-30

## 2020-03-30 NOTE — TELEPHONE ENCOUNTER
Called pt to see if pt would like to change visit to video visit and pt states she would like to just reschedule appt period.  Message sent to scheduling to reschedule at a later time.

## 2020-03-31 ENCOUNTER — APPOINTMENT (OUTPATIENT)
Dept: LAB | Facility: HOSPITAL | Age: 35
End: 2020-03-31

## 2020-04-15 ENCOUNTER — TELEPHONE (OUTPATIENT)
Dept: FAMILY MEDICINE CLINIC | Facility: CLINIC | Age: 35
End: 2020-04-15

## 2020-04-15 DIAGNOSIS — H53.9 VISION ABNORMALITIES: Primary | ICD-10-CM

## 2020-04-15 DIAGNOSIS — H16.311: ICD-10-CM

## 2020-04-15 NOTE — TELEPHONE ENCOUNTER
PATIENT STATES THAT SHE HAS BEEN HAVING SOME EYE ISSUES AND WAS ADVISED BY HER EYE DOCTOR THAT SHE WOULD NEED A REFERRAL . SHE STATES THAT THE EYE DOCTOR THAT SHE WAS REFERRED TO IN FEB ( [facility] ) BY OUR OFFICE WAS NOT ABLE TO HELP HER SO THEY SENT HER TO A  DR BOO KIRKLAND  WHICH SHE STATES WILL NEED THE REFERRAL. THE PATIENT WOULD LIKE TO GET A CALL BACK IN REGARDS TO THIS MATTER TODAY IF POSSIBLE AS SHE STATES THAT SHE HAS AN APPOINTMENT TODAY 04/15/2020 AT 1PM. PLEASE ADVISE AND CALL PT [phone].

## 2020-04-15 NOTE — TELEPHONE ENCOUNTER
Patient called in requesting a referral for another eye doctor.   She would like to go to [place] for Dr. Craig    Please call back to advise.    Patient call back # [phone]

## 2020-04-15 NOTE — TELEPHONE ENCOUNTER
Referral changed and approved for Dr Craig.   Patient also asking for back dating of Catsro Melchor for visits 2/25, 2/26, 2/28 and 3/5

## 2020-04-15 NOTE — TELEPHONE ENCOUNTER
PATIENT STATES THAT SHE WOULD LIKE TO SEE DR. ISABELLE RUBIN INSTEAD HIS  PHONE NUMBER IS  [phone].  PATIENT STATES SHE HAS AN APPOINTMENT TOMORROW AT 10:00AM.    PLEASE ADVISE

## 2020-04-16 NOTE — TELEPHONE ENCOUNTER
PATIENT CALLED THIS AM UPSET THAT I HAD NOT GOTTEN THIS COMPLETED YET. I TOLD HER I JUST GOT IN THE OFFICE AT 8.    I EXPLAINED TO HER I RECEIVED THIS YESTERDAY HOUR BEFORE CLOSING TIME. I TOLD HER  I CHANGE  OPTHAMOLOGIST 3 TIMES YESTERDAY AND WILL TRY TO GET THIS CHANGED  BUT I HAVE NO GUARANTEE THEY WOULD APPROVE IT SINCE IT WAS CHANGED SO MANY TIMES. HER APPT WAS THIS AM 10:00. I TOLD HER I HAVE SOMEONE IN FRONT OF ME AT THIS TIME AND WHEN I WAS DONE TAKING CARE OF THEM I WOULD WORK ON IT NEXT.     SHE CALLED THE HUB BACK AND WANTED TO TALK TO THE MANAGER. AND TOLD THE HUB THAT I YELLED AT HER.     I SPOKE TO HER AGAIN AND SAID IM SORRY YOU FELT I WAS YELLING. I FEEL AS IF I AM TALKING TO YOU THE SAME NOW AS I WAS THEN AND DIDNT FEEL THAT I HAVE RAISED MY VOICE WHAT SO EVER. I AM A MATTER OF FACT BUT NO YELLING. SHE AGREE     APPROVED AND DOUBLE CHECKED WITH THE DR OFFICE TO MAKE SURE ALL WAS CORRECT. CALLED PATIENT BACK AND LET HER KNOW THIS WAS APPROVED.

## 2020-04-23 DIAGNOSIS — E53.8 B12 DEFICIENCY: ICD-10-CM

## 2020-04-23 DIAGNOSIS — I82.90 THROMBOSIS: Primary | ICD-10-CM

## 2020-04-23 DIAGNOSIS — E61.1 IRON DEFICIENCY: ICD-10-CM

## 2020-04-24 ENCOUNTER — TELEPHONE (OUTPATIENT)
Dept: ONCOLOGY | Facility: CLINIC | Age: 35
End: 2020-04-24

## 2020-04-27 ENCOUNTER — APPOINTMENT (OUTPATIENT)
Dept: LAB | Facility: HOSPITAL | Age: 35
End: 2020-04-27

## 2020-04-27 ENCOUNTER — LAB (OUTPATIENT)
Dept: LAB | Facility: HOSPITAL | Age: 35
End: 2020-04-27

## 2020-04-27 ENCOUNTER — TELEPHONE (OUTPATIENT)
Dept: ONCOLOGY | Facility: CLINIC | Age: 35
End: 2020-04-27

## 2020-04-27 DIAGNOSIS — E53.8 B12 DEFICIENCY: ICD-10-CM

## 2020-04-27 DIAGNOSIS — I82.90 THROMBOSIS: ICD-10-CM

## 2020-04-27 DIAGNOSIS — E61.1 IRON DEFICIENCY: ICD-10-CM

## 2020-04-27 LAB
BASOPHILS # BLD AUTO: 0.03 10*3/MM3 (ref 0–0.2)
BASOPHILS NFR BLD AUTO: 0.3 % (ref 0–1.5)
DEPRECATED RDW RBC AUTO: 40.9 FL (ref 37–54)
EOSINOPHIL # BLD AUTO: 0.09 10*3/MM3 (ref 0–0.4)
EOSINOPHIL NFR BLD AUTO: 0.9 % (ref 0.3–6.2)
ERYTHROCYTE [DISTWIDTH] IN BLOOD BY AUTOMATED COUNT: 12.1 % (ref 12.3–15.4)
ERYTHROCYTE [SEDIMENTATION RATE] IN BLOOD: 48 MM/HR (ref 0–20)
FERRITIN SERPL-MCNC: 195.9 NG/ML (ref 13–150)
HCT VFR BLD AUTO: 36.5 % (ref 34–46.6)
HGB BLD-MCNC: 12.9 G/DL (ref 12–15.9)
IRON 24H UR-MRATE: 50 MCG/DL (ref 37–145)
IRON SATN MFR SERPL: 13 % (ref 20–50)
LYMPHOCYTES # BLD AUTO: 3.16 10*3/MM3 (ref 0.7–3.1)
LYMPHOCYTES NFR BLD AUTO: 32.4 % (ref 19.6–45.3)
MCH RBC QN AUTO: 33.4 PG (ref 26.6–33)
MCHC RBC AUTO-ENTMCNC: 35.3 G/DL (ref 31.5–35.7)
MCV RBC AUTO: 94.6 FL (ref 79–97)
MONOCYTES # BLD AUTO: 0.73 10*3/MM3 (ref 0.1–0.9)
MONOCYTES NFR BLD AUTO: 7.5 % (ref 5–12)
NEUTROPHILS # BLD AUTO: 5.74 10*3/MM3 (ref 1.7–7)
NEUTROPHILS NFR BLD AUTO: 58.9 % (ref 42.7–76)
PLATELET # BLD AUTO: 508 10*3/MM3 (ref 140–450)
PMV BLD AUTO: 8 FL (ref 6–12)
RBC # BLD AUTO: 3.86 10*6/MM3 (ref 3.77–5.28)
TIBC SERPL-MCNC: 374 MCG/DL (ref 298–536)
TRANSFERRIN SERPL-MCNC: 251 MG/DL (ref 200–360)
WBC NRBC COR # BLD: 9.75 10*3/MM3 (ref 3.4–10.8)

## 2020-04-27 PROCEDURE — 82728 ASSAY OF FERRITIN: CPT | Performed by: NURSE PRACTITIONER

## 2020-04-27 PROCEDURE — 84466 ASSAY OF TRANSFERRIN: CPT | Performed by: NURSE PRACTITIONER

## 2020-04-27 PROCEDURE — 85652 RBC SED RATE AUTOMATED: CPT | Performed by: NURSE PRACTITIONER

## 2020-04-27 PROCEDURE — 85025 COMPLETE CBC W/AUTO DIFF WBC: CPT

## 2020-04-27 PROCEDURE — 83540 ASSAY OF IRON: CPT | Performed by: NURSE PRACTITIONER

## 2020-04-27 PROCEDURE — 36415 COLL VENOUS BLD VENIPUNCTURE: CPT

## 2020-04-27 NOTE — TELEPHONE ENCOUNTER
2X CALLING  Patient has video visit with Dr. Dumas on 4/29.  She has  and Dr. Flynn is on the referral.  Patient says that insurance won't pay for the visit unless she sees the doctor who is on the referral.     Please reschedule appt with Dr. Flynn.    Call pt at [phone] (patients work phone,REQUEST TO SPEAK TO [place])

## 2020-04-30 ENCOUNTER — TELEMEDICINE (OUTPATIENT)
Dept: ONCOLOGY | Facility: CLINIC | Age: 35
End: 2020-04-30

## 2020-04-30 DIAGNOSIS — D75.839 THROMBOCYTOSIS: Primary | ICD-10-CM

## 2020-04-30 PROCEDURE — 99214 OFFICE O/P EST MOD 30 MIN: CPT | Performed by: INTERNAL MEDICINE

## 2020-04-30 NOTE — PROGRESS NOTES
Hematology/Oncology Outpatient Follow Up    PATIENT NAME:Chrystal Walters  :1985  MRN: [id number]  PRIMARY CARE PHYSICIAN: Belinda Smith MD  REFERRING PHYSICIAN: Belinda Smith MD    Chief Complaint   Patient presents with   • Follow-up     Thrombocytosis       This was an audio and video enabled telemedicine encounter. Patient  Consented    HISTORY OF PRESENT ILLNESS:   Ms. Walters is a 33-year-old female who presents to our office on 19 for consultation regarding thrombocytosis.  She had a CBC on 10/1/18 that showed a platelet count of 487,000.  She is consulted for thrombocytosis.    • 10/1/18 - WBC 6.3, hemoglobin 12.9, platelet count 487,000, MCV 92.8.  Magnesium 1.8.  B12 211.  TSH 1.37.  ESR 48 (H).  Vitamin D 22(L).  Creatinine 0.7, BUN 11.  LFTs are normal.    • 18 - WBC 5.2, hemoglobin 13.1, platelet count 471,000, MCV 94.4.      2019 - The patient presents for initial consultation.  She denies any history of strokes.  She denies any fevers, chills, night sweats, weight loss or lymph node swelling.  She denies any history of thrombosis.  She does report slight excess bruising.  She denies any personal or family history of lupus or any connective tissue disorder.  She denies any recent infection.  Patient denies any bleeding per rectum.  She does not have any menstrual cycles since her hysterectomy.  • 19 - WBC 8.7, hemoglobin 12.6, platelet count 488,000, MCV 93.1.    • 19 - B12 259.  CRP 0.41.  Ferritin 108.  Folate 20.6.  Iron saturation 14% (L), TIBC 338, serum iron 49.  ESR 41 (H).  Antiparietal cell antibodies negative.  Intrinsic factor antibodies negative.  Peripheral blood smear:  Thrombocytosis.  No immature cells noted.  • 19 - WBC 9.3, hemoglobin 12.8, platelet count 462,000, MCV 94.5.    • [facility]:  Normal.  No mutations detected.    • 19 - WBC 8.3, hemoglobin 13.6, platelet count  453,000, MCV 92.8.  Neutrophils 6590.  Creatinine 0.7, BUN 12.  LFTs are normal.    • 19 - Chest x-ray:  Negative.   • 2020 Obi 2 was negative  • 2020 patient had a ferritin level which was 195, TIBC was normal at 374, iron saturation was low at 13%, white count was 9.7, hemoglobin 12.9 and platelets were 508 differentials were 58% neutrophils, 32% lymphocytes, there was no monocytosis, eosinophilia or basophilia.  Sed rate was elevated to 48    Past Medical History:   Diagnosis Date   • B12 deficiency    • Low back pain    • Obesity    • Sleep apnea    • Thrombocytosis (CMS/HCC)    • Vitamin D deficiency        Past Surgical History:   Procedure Laterality Date   •  SECTION     • CYSTOSCOPY     • EYE SURGERY     • HYSTERECTOMY           Current Outpatient Medications:   •  Cholecalciferol (VITAMIN D3) 1000 units capsule, Take 4,000 Units by mouth Daily. Take four  gummies daily(1000 each), Disp: , Rfl:   •  Cyanocobalamin ([employer]S B12 GUMMIES) 500 MCG chewable tablet, Chew 500 mg Daily., Disp: , Rfl:   •  Multiple Vitamins-Minerals (ONE DAILY MULTIVITAMIN WOMEN) tablet, Take 1 tablet by mouth Daily., Disp: , Rfl:     No Known Allergies    Family History   Problem Relation Age of Onset   • Alcohol abuse Father    • Arthritis Father    • Diabetes Father    • Hyperlipidemia Father    • Anxiety disorder Mother        Cancer-related family history is not on file.    Social History     Tobacco Use   • Smoking status: Never Smoker   • Smokeless tobacco: Never Used   Substance Use Topics   • Alcohol use: No   • Drug use: No       I have reviewed and confirmed the accuracy of the patient's history: as entered by the MA/LPN/RN. Andreia Flynn MD 20       SUBJECTIVE:    Has no specific complaints today.  Was seen by her primary care physician in the process of evaluating her for arthritis had x-ray of the hands on 2020 which were all normal.  Patient does have chronic back pain but  she said that this is getting better.  She has no other new issues today.          REVIEW OF SYSTEMS:  Review of Systems   Constitutional: Negative for chills and fever.   HENT: Negative for ear pain, mouth sores, nosebleeds and sore throat.    Eyes: Negative for photophobia and visual disturbance.   Respiratory: Negative for wheezing and stridor.    Cardiovascular: Negative for chest pain and palpitations.   Gastrointestinal: Negative for abdominal pain, diarrhea, nausea and vomiting.   Endocrine: Negative for cold intolerance and heat intolerance.   Genitourinary: Negative for dysuria and hematuria.   Musculoskeletal: Positive for arthralgias. Negative for joint swelling and neck stiffness.   Skin: Negative for color change and rash.   Neurological: Negative for seizures and syncope.   Hematological: Negative for adenopathy.        No obvious bleeding   Psychiatric/Behavioral: Negative for agitation, confusion and hallucinations.       OBJECTIVE:    There were no vitals filed for this visit.  There is no height or weight on file to calculate BMI.    ECOG  (0) Fully active, able to carry on all predisease performance without restriction    Physical Exam   Constitutional: She is oriented to person, place, and time. She appears well-developed and well-nourished.   HENT:   Head: Normocephalic.   Right Ear: External ear normal.   Left Ear: External ear normal.   Nose: Nose normal.   Eyes: Pupils are equal, round, and reactive to light. EOM are normal.   Neck: Normal range of motion.   Pulmonary/Chest: Effort normal.   Musculoskeletal: Normal range of motion.   Neurological: She is alert and oriented to person, place, and time.   Psychiatric: She has a normal mood and affect. Her behavior is normal.       RECENT LABS  WBC   Date Value Ref Range Status   04/27/2020 9.75 3.40 - 10.80 10*3/mm3 Final     RBC   Date Value Ref Range Status   04/27/2020 3.86 3.77 - 5.28 10*6/mm3 Final     Hemoglobin   Date Value Ref Range  Status   04/27/2020 12.9 12.0 - 15.9 g/dL Final     Hematocrit   Date Value Ref Range Status   04/27/2020 36.5 34.0 - 46.6 % Final     MCV   Date Value Ref Range Status   04/27/2020 94.6 79.0 - 97.0 fL Final     MCH   Date Value Ref Range Status   04/27/2020 33.4 (H) 26.6 - 33.0 pg Final     MCHC   Date Value Ref Range Status   04/27/2020 35.3 31.5 - 35.7 g/dL Final     RDW   Date Value Ref Range Status   04/27/2020 12.1 (L) 12.3 - 15.4 % Final     RDW-SD   Date Value Ref Range Status   04/27/2020 40.9 37.0 - 54.0 fl Final     MPV   Date Value Ref Range Status   04/27/2020 8.0 6.0 - 12.0 fL Final     Platelets   Date Value Ref Range Status   04/27/2020 508 (H) 140 - 450 10*3/mm3 Final     Neutrophil %   Date Value Ref Range Status   04/27/2020 58.9 42.7 - 76.0 % Final     Lymphocyte %   Date Value Ref Range Status   04/27/2020 32.4 19.6 - 45.3 % Final     Monocyte %   Date Value Ref Range Status   04/27/2020 7.5 5.0 - 12.0 % Final     Eosinophil %   Date Value Ref Range Status   04/27/2020 0.9 0.3 - 6.2 % Final     Basophil %   Date Value Ref Range Status   04/27/2020 0.3 0.0 - 1.5 % Final     Immature Grans %   Date Value Ref Range Status   02/05/2020 0.2 0.0 - 0.5 % Final     Neutrophils, Absolute   Date Value Ref Range Status   04/27/2020 5.74 1.70 - 7.00 10*3/mm3 Final     Lymphocytes, Absolute   Date Value Ref Range Status   04/27/2020 3.16 (H) 0.70 - 3.10 10*3/mm3 Final     Monocytes, Absolute   Date Value Ref Range Status   04/27/2020 0.73 0.10 - 0.90 10*3/mm3 Final     Eosinophils, Absolute   Date Value Ref Range Status   04/27/2020 0.09 0.00 - 0.40 10*3/mm3 Final     Basophils, Absolute   Date Value Ref Range Status   04/27/2020 0.03 0.00 - 0.20 10*3/mm3 Final     Immature Grans, Absolute   Date Value Ref Range Status   02/05/2020 0.01 0.00 - 0.05 10*3/mm3 Final     nRBC   Date Value Ref Range Status   02/05/2020 0.0 0.0 - 0.2 /100 WBC Final       Lab Results   Component Value Date    GLUCOSE 98 02/05/2020     BUN 11 02/05/2020    CREATININE 0.66 02/05/2020    EGFRIFNONA 103 02/05/2020    BCR 16.7 02/05/2020    K 4.1 02/05/2020    CO2 25.0 02/05/2020    CALCIUM 9.5 02/05/2020    ALBUMIN 4.50 02/05/2020    LABIL2 1.3 05/29/2019    AST 21 02/05/2020    ALT 31 02/05/2020         Assessment/Plan     Thrombocytosis (CMS/HCC)  - BCR-ABL FISH  - Soluble Transferrin Receptor  - Lactate Dehydrogenase  - Uric Acid  - US Abdomen Limited      ASSESSMENT:    1. Thrombocytosis:  This is felt to be reactive thrombocytosis.  She has mild underlying inflammation and also has mild iron deficiency.  MPN panel by [facility] was negative.  Peripheral blood smear failed to show any malignant cells.  Platelets continue to remain high.  We will check BCR ABL and ultrasound of the abdomen to evaluate for splenomegaly.  If all negative, patient would consider bone marrow biopsy under conscious sedation  2. Low-normal vitamin B12:  Intrinsic factor and parietal cell antibodies are negative.  She has started B12 supplements.  3. Mild iron deficiency:  Patient had a hysterectomy and therefore, does not have any menorrhagia.  She denies any rectal bleeding.  She is taking iron supplements now.  Her serum ferritin is normal but will check serum transferrin receptor assay  4. Anxiety.  5. Possible lactose intolerance with GI problems.      PLAN:     1. Continue baby aspirin.    2. Continue iron and B12 supplements.  3. Ultrasound of the spleen, BCR ABL, uric acid, LDH and serum transferrin receptor assay  4. She will see me in 2 weeks         I have reviewed labs results, imaging, vitals, and medications with the patient today. Will follow up in 2 weeks  months with me.    Patient verbalized understanding and is in agreement of the above plan.  I spent more than 30 minutes discussing with patient management recommendations, reviewing imaging studies, lab results, progress notes and formulating management decisions.  Time was also spent answering all  his/ her questions to the best of my abilities.

## 2020-05-01 ENCOUNTER — LAB (OUTPATIENT)
Dept: LAB | Facility: HOSPITAL | Age: 35
End: 2020-05-01

## 2020-05-01 DIAGNOSIS — D75.839 THROMBOCYTOSIS: ICD-10-CM

## 2020-05-01 LAB
LDH SERPL-CCNC: 151 U/L (ref 135–214)
URATE SERPL-MCNC: 4.8 MG/DL (ref 2.4–5.7)

## 2020-05-01 PROCEDURE — 84550 ASSAY OF BLOOD/URIC ACID: CPT | Performed by: INTERNAL MEDICINE

## 2020-05-01 PROCEDURE — 36415 COLL VENOUS BLD VENIPUNCTURE: CPT

## 2020-05-01 PROCEDURE — 83615 LACTATE (LD) (LDH) ENZYME: CPT | Performed by: INTERNAL MEDICINE

## 2020-05-04 LAB — STFR SERPL-SCNC: 18.8 NMOL/L (ref 12.2–27.3)

## 2020-05-08 LAB
CELLS ANALYZED: 200
CELLS COUNTED: 200
CHROM ANALY RESULT (ISCN): NORMAL
INTERPRETATION: NORMAL
LAB DIRECTOR NAME PROVIDER: NORMAL
SPECIMEN SOURCE: NORMAL

## 2020-05-14 ENCOUNTER — TELEMEDICINE (OUTPATIENT)
Dept: ONCOLOGY | Facility: CLINIC | Age: 35
End: 2020-05-14

## 2020-05-14 DIAGNOSIS — D75.839 THROMBOCYTOSIS: Primary | ICD-10-CM

## 2020-05-14 PROCEDURE — 99214 OFFICE O/P EST MOD 30 MIN: CPT | Performed by: INTERNAL MEDICINE

## 2020-05-14 NOTE — PROGRESS NOTES
Hematology/Oncology Outpatient Follow Up    PATIENT NAME:Chrystal Walters  :1985  MRN: [id number]  PRIMARY CARE PHYSICIAN: Belinda Smith MD  REFERRING PHYSICIAN: Belinda Smith MD    Chief Complaint   Patient presents with   • Follow-up     thrombocytosis       This was an audio and video enabled telemedicine encounter. Patient  Consented.      HISTORY OF PRESENT ILLNESS:   Ms. Walters is a 34-year-old female who presents to our office on 19 for consultation regarding thrombocytosis.  She had a CBC on 10/1/18 that showed a platelet count of 487,000.  She is consulted for thrombocytosis.    • 10/1/18 - WBC 6.3, hemoglobin 12.9, platelet count 487,000, MCV 92.8.  Magnesium 1.8.  B12 211.  TSH 1.37.  ESR 48 (H).  Vitamin D 22(L).  Creatinine 0.7, BUN 11.  LFTs are normal.    • 18 - WBC 5.2, hemoglobin 13.1, platelet count 471,000, MCV 94.4.      2019 - The patient presents for initial consultation.  She denies any history of strokes.  She denies any fevers, chills, night sweats, weight loss or lymph node swelling.  She denies any history of thrombosis.  She does report slight excess bruising.  She denies any personal or family history of lupus or any connective tissue disorder.  She denies any recent infection.  Patient denies any bleeding per rectum.  She does not have any menstrual cycles since her hysterectomy.  • 19 - WBC 8.7, hemoglobin 12.6, platelet count 488,000, MCV 93.1.    • 19 - B12 259.  CRP 0.41.  Ferritin 108.  Folate 20.6.  Iron saturation 14% (L), TIBC 338, serum iron 49.  ESR 41 (H).  Antiparietal cell antibodies negative.  Intrinsic factor antibodies negative.  Peripheral blood smear:  Thrombocytosis.  No immature cells noted.  • 19 - WBC 9.3, hemoglobin 12.8, platelet count 462,000, MCV 94.5.    • 19 - On[facility] myeloproliferative neoplasm panel by NGS:  Normal.  No mutations detected.    • 19 - WBC 8.3, hemoglobin 13.6, platelet count  453,000, MCV 92.8.  Neutrophils 6590.  Creatinine 0.7, BUN 12.  LFTs are normal.    • 19 - Chest x-ray:  Negative.   • 2020 Obi 2 was negative  • 2020 patient had a ferritin level which was 195, TIBC was normal at 374, iron saturation was low at 13%, white count was 9.7, hemoglobin 12.9 and platelets were 508 differentials were 58% neutrophils, 32% lymphocytes, there was no monocytosis, eosinophilia or basophilia.  Sed rate was elevated to 48  • 2020-patient had a uric acid level which was normal, LDH was normal, soluble transferrin receptor assay was normal and BCR ABL was negative.  • Since her last visit patient has suffered retinal detachment and is scheduled for surgery 5/15/2020    Past Medical History:   Diagnosis Date   • B12 deficiency    • Low back pain    • Obesity    • Sleep apnea    • Thrombocytosis (CMS/HCC)    • Vitamin D deficiency        Past Surgical History:   Procedure Laterality Date   •  SECTION     • CYSTOSCOPY     • EYE SURGERY     • HYSTERECTOMY           Current Outpatient Medications:   •  Cholecalciferol (VITAMIN D3) 1000 units capsule, Take 4,000 Units by mouth Daily. Take four  gummies daily(1000 each), Disp: , Rfl:   •  Cyanocobalamin ([employer] B12 GUMMIES) 500 MCG chewable tablet, Chew 500 mg Daily., Disp: , Rfl:   •  Multiple Vitamins-Minerals (ONE DAILY MULTIVITAMIN WOMEN) tablet, Take 1 tablet by mouth Daily., Disp: , Rfl:     No Known Allergies    Family History   Problem Relation Age of Onset   • Alcohol abuse Father    • Arthritis Father    • Diabetes Father    • Hyperlipidemia Father    • Anxiety disorder Mother        Cancer-related family history is not on file.    Social History     Tobacco Use   • Smoking status: Never Smoker   • Smokeless tobacco: Never Used   Substance Use Topics   • Alcohol use: No   • Drug use: No       I have reviewed and confirmed the accuracy of the patient's history: as entered by the MA/LPN/RN. Andreia Flynn MD  05/14/20       SUBJECTIVE:    Has no specific complaints today.  Patient has visual impairment on the right eye and is scheduled for retinal surgery for retinal detachment on 5/15/2020.  He is asking to have ultrasound of the abdomen end of May        REVIEW OF SYSTEMS:  Review of Systems   Constitutional: Negative for chills and fever.   HENT: Negative for ear pain, mouth sores, nosebleeds and sore throat.    Eyes: Positive for visual disturbance. Negative for photophobia.        Right eye   Respiratory: Negative for wheezing and stridor.    Cardiovascular: Negative for chest pain and palpitations.   Gastrointestinal: Negative for abdominal pain, diarrhea, nausea and vomiting.   Endocrine: Negative for cold intolerance and heat intolerance.   Genitourinary: Negative for dysuria and hematuria.   Musculoskeletal: Positive for arthralgias. Negative for joint swelling and neck stiffness.   Skin: Negative for color change and rash.   Neurological: Negative for seizures and syncope.   Hematological: Negative for adenopathy.        No obvious bleeding   Psychiatric/Behavioral: Negative for agitation, confusion and hallucinations.       OBJECTIVE:    There were no vitals filed for this visit.  There is no height or weight on file to calculate BMI.    ECOG  (0) Fully active, able to carry on all predisease performance without restriction    Physical Exam   Constitutional: She is oriented to person, place, and time. She appears well-developed and well-nourished.   HENT:   Head: Normocephalic.   Right Ear: External ear normal.   Left Ear: External ear normal.   Nose: Nose normal.   Eyes:   Right eye covered   Neck: Normal range of motion.   Pulmonary/Chest: Effort normal.   Musculoskeletal: Normal range of motion.   Neurological: She is alert and oriented to person, place, and time.   Psychiatric: She has a normal mood and affect. Her behavior is normal.       RECENT LABS  WBC   Date Value Ref Range Status   04/27/2020 9.75  3.40 - 10.80 10*3/mm3 Final     RBC   Date Value Ref Range Status   04/27/2020 3.86 3.77 - 5.28 10*6/mm3 Final     Hemoglobin   Date Value Ref Range Status   04/27/2020 12.9 12.0 - 15.9 g/dL Final     Hematocrit   Date Value Ref Range Status   04/27/2020 36.5 34.0 - 46.6 % Final     MCV   Date Value Ref Range Status   04/27/2020 94.6 79.0 - 97.0 fL Final     MCH   Date Value Ref Range Status   04/27/2020 33.4 (H) 26.6 - 33.0 pg Final     MCHC   Date Value Ref Range Status   04/27/2020 35.3 31.5 - 35.7 g/dL Final     RDW   Date Value Ref Range Status   04/27/2020 12.1 (L) 12.3 - 15.4 % Final     RDW-SD   Date Value Ref Range Status   04/27/2020 40.9 37.0 - 54.0 fl Final     MPV   Date Value Ref Range Status   04/27/2020 8.0 6.0 - 12.0 fL Final     Platelets   Date Value Ref Range Status   04/27/2020 508 (H) 140 - 450 10*3/mm3 Final     Neutrophil %   Date Value Ref Range Status   04/27/2020 58.9 42.7 - 76.0 % Final     Lymphocyte %   Date Value Ref Range Status   04/27/2020 32.4 19.6 - 45.3 % Final     Monocyte %   Date Value Ref Range Status   04/27/2020 7.5 5.0 - 12.0 % Final     Eosinophil %   Date Value Ref Range Status   04/27/2020 0.9 0.3 - 6.2 % Final     Basophil %   Date Value Ref Range Status   04/27/2020 0.3 0.0 - 1.5 % Final     Immature Grans %   Date Value Ref Range Status   02/05/2020 0.2 0.0 - 0.5 % Final     Neutrophils, Absolute   Date Value Ref Range Status   04/27/2020 5.74 1.70 - 7.00 10*3/mm3 Final     Lymphocytes, Absolute   Date Value Ref Range Status   04/27/2020 3.16 (H) 0.70 - 3.10 10*3/mm3 Final     Monocytes, Absolute   Date Value Ref Range Status   04/27/2020 0.73 0.10 - 0.90 10*3/mm3 Final     Eosinophils, Absolute   Date Value Ref Range Status   04/27/2020 0.09 0.00 - 0.40 10*3/mm3 Final     Basophils, Absolute   Date Value Ref Range Status   04/27/2020 0.03 0.00 - 0.20 10*3/mm3 Final     Immature Grans, Absolute   Date Value Ref Range Status   02/05/2020 0.01 0.00 - 0.05 10*3/mm3  Final     [facility]   Date Value Ref Range Status   02/05/2020 0.0 0.0 - 0.2 /100 WBC Final       Lab Results   Component Value Date    GLUCOSE 98 02/05/2020    BUN 11 02/05/2020    CREATININE 0.66 02/05/2020    EGFRIFNONA 103 02/05/2020    BCR 16.7 02/05/2020    K 4.1 02/05/2020    CO2 25.0 02/05/2020    CALCIUM 9.5 02/05/2020    ALBUMIN 4.50 02/05/2020    LABIL2 1.3 05/29/2019    AST 21 02/05/2020    ALT 31 02/05/2020         Assessment/Plan     There are no diagnoses linked to this encounter.    ASSESSMENT:    1. Thrombocytosis:  This is felt to be reactive thrombocytosis.  She has mild underlying inflammation and also has mild iron deficiency.  MPN panel by [facility] was negative.  Peripheral blood smear failed to show any malignant cells.  Platelets continue to remain high.  BCR ABL, uric acid, LDH were normal .Ultrasound of the abdomen to evaluate for splenomegaly is pending.  If  negative, patient would consider bone marrow biopsy under conscious sedation  2. Low-normal vitamin B12:  Intrinsic factor and parietal cell antibodies are negative.  She has been on B12 supplements.  3. Mild iron deficiency:  Patient had a hysterectomy and therefore, does not have any menorrhagia.  She denies any rectal bleeding.  She is taking iron supplements now.  Her serum ferritin is normal but will check serum transferrin receptor assay  4. Anxiety.  5. Possible lactose intolerance with GI problems.      PLAN:     1. Continue baby aspirin.    2. Continue iron and B12 supplements.  3. Ultrasound of the spleen, end of May  4. She will see me in 3 weeks         I have reviewed labs results, imaging, vitals, and medications with the patient today. Will follow up in 3 weeks  months with me.    Patient verbalized understanding and is in agreement of the above plan.    I spent more than 20 minutes discussing with patient management recommendations, reviewing imaging studies, lab results, progress notes and formulating management  decisions.  Time was also spent answering all his/ her questions to the best of my abilities.

## 2020-05-15 ENCOUNTER — TELEPHONE (OUTPATIENT)
Dept: FAMILY MEDICINE CLINIC | Facility: CLINIC | Age: 35
End: 2020-05-15

## 2020-05-15 NOTE — TELEPHONE ENCOUNTER
I submitted ophthalmologist change to Dr Jairo Weber during lunch yesterday. Pt  called again this Am and I resumitted again hoping it would change for today. Pt having surgery this AM.    I made copy and emailed Chrystal the pending review order

## 2020-05-18 ENCOUNTER — TELEPHONE (OUTPATIENT)
Dept: ONCOLOGY | Facility: CLINIC | Age: 35
End: 2020-05-18

## 2020-05-18 NOTE — TELEPHONE ENCOUNTER
Called patient to remind her about her US that is due. Left vm for her to call to schedule that at the hospital.

## 2020-06-15 ENCOUNTER — TELEPHONE (OUTPATIENT)
Dept: ONCOLOGY | Facility: CLINIC | Age: 35
End: 2020-06-15

## 2020-06-15 NOTE — TELEPHONE ENCOUNTER
Called patient to schedule her 3 week follow up from 5/14/20.  Patient states she is having an eye surgery tomorrow so she has not had the US that Dr. Flynn ordered.  She is trying to organize her medical appointments so she will call back to schedule once she has completed the orders.

## 2020-11-19 ENCOUNTER — TELEPHONE (OUTPATIENT)
Dept: ONCOLOGY | Facility: CLINIC | Age: 35
End: 2020-11-19

## 2020-11-19 NOTE — TELEPHONE ENCOUNTER
LM ASKING PT TO CONTACT [facility] TO SCHEDULE SCAN AND OUR OFFICE TO SCHEDULE FUP

## 2020-11-19 NOTE — TELEPHONE ENCOUNTER
----- Message from Na Fountain RN sent at 11/5/2020 12:31 PM EST -----  Pt did not call back to schedule her f/u with Dr. Flynn in early June.  D/T other medical issues and apts.  She needs a US Abd prior to her MD visit

## 2020-11-20 DIAGNOSIS — D75.839 THROMBOCYTOSIS: Primary | ICD-10-CM

## 2021-02-24 ENCOUNTER — TELEPHONE (OUTPATIENT)
Dept: FAMILY MEDICINE CLINIC | Facility: CLINIC | Age: 36
End: 2021-02-24

## 2021-02-24 DIAGNOSIS — Z11.4 SCREENING FOR HIV (HUMAN IMMUNODEFICIENCY VIRUS): ICD-10-CM

## 2021-02-24 DIAGNOSIS — E53.8 B12 DEFICIENCY: ICD-10-CM

## 2021-02-24 DIAGNOSIS — E78.5 HYPERLIPIDEMIA, UNSPECIFIED HYPERLIPIDEMIA TYPE: Primary | ICD-10-CM

## 2021-02-24 DIAGNOSIS — D75.839 THROMBOCYTOSIS: ICD-10-CM

## 2021-02-24 DIAGNOSIS — R70.0 ELEVATED SED RATE: ICD-10-CM

## 2021-02-24 DIAGNOSIS — Z91.89 ENCOUNTER FOR HEPATITIS C VIRUS SCREENING TEST FOR HIGH RISK PATIENT: ICD-10-CM

## 2021-02-24 DIAGNOSIS — E55.9 VITAMIN D DEFICIENCY: ICD-10-CM

## 2021-02-24 DIAGNOSIS — Z11.59 ENCOUNTER FOR HEPATITIS C VIRUS SCREENING TEST FOR HIGH RISK PATIENT: ICD-10-CM

## 2021-02-24 NOTE — TELEPHONE ENCOUNTER
Pt has an appt on the 9th. She would like to have labs completed before the appt so she can discuss with you during her appt. Can you go ahead and put orders in if that is okay with you.

## 2021-03-02 PROCEDURE — 87086 URINE CULTURE/COLONY COUNT: CPT | Performed by: NURSE PRACTITIONER

## 2021-03-02 PROCEDURE — 87186 SC STD MICRODIL/AGAR DIL: CPT | Performed by: NURSE PRACTITIONER

## 2021-03-02 PROCEDURE — 87077 CULTURE AEROBIC IDENTIFY: CPT | Performed by: NURSE PRACTITIONER

## 2021-03-03 DIAGNOSIS — R73.09 ABNORMAL GLUCOSE: Primary | ICD-10-CM

## 2021-03-05 ENCOUNTER — LAB (OUTPATIENT)
Dept: FAMILY MEDICINE CLINIC | Facility: CLINIC | Age: 36
End: 2021-03-05

## 2021-03-05 ENCOUNTER — CLINICAL SUPPORT (OUTPATIENT)
Dept: FAMILY MEDICINE CLINIC | Facility: CLINIC | Age: 36
End: 2021-03-05

## 2021-03-05 DIAGNOSIS — E53.8 B12 DEFICIENCY: ICD-10-CM

## 2021-03-05 DIAGNOSIS — R70.0 ELEVATED SED RATE: ICD-10-CM

## 2021-03-05 DIAGNOSIS — E78.5 HYPERLIPIDEMIA, UNSPECIFIED HYPERLIPIDEMIA TYPE: ICD-10-CM

## 2021-03-05 DIAGNOSIS — E55.9 VITAMIN D DEFICIENCY: ICD-10-CM

## 2021-03-05 LAB
25(OH)D3 SERPL-MCNC: 24.9 NG/ML (ref 30–100)
ALBUMIN SERPL-MCNC: 4.7 G/DL (ref 3.5–5.2)
ALBUMIN/GLOB SERPL: 1.4 G/DL
ALP SERPL-CCNC: 50 U/L (ref 39–117)
ALT SERPL W P-5'-P-CCNC: 20 U/L (ref 1–33)
ANION GAP SERPL CALCULATED.3IONS-SCNC: 13.1 MMOL/L (ref 5–15)
AST SERPL-CCNC: 19 U/L (ref 1–32)
BASOPHILS # BLD AUTO: 0.03 10*3/MM3 (ref 0–0.2)
BASOPHILS NFR BLD AUTO: 0.5 % (ref 0–1.5)
BILIRUB SERPL-MCNC: 0.4 MG/DL (ref 0–1.2)
BUN SERPL-MCNC: 16 MG/DL (ref 6–20)
BUN/CREAT SERPL: 24.2 (ref 7–25)
CALCIUM SPEC-SCNC: 9.8 MG/DL (ref 8.6–10.5)
CHLORIDE SERPL-SCNC: 102 MMOL/L (ref 98–107)
CHOLEST SERPL-MCNC: 201 MG/DL (ref 0–200)
CO2 SERPL-SCNC: 21.9 MMOL/L (ref 22–29)
CREAT SERPL-MCNC: 0.66 MG/DL (ref 0.57–1)
CRP SERPL-MCNC: <0.3 MG/DL (ref 0–0.5)
DEPRECATED RDW RBC AUTO: 39.5 FL (ref 37–54)
EOSINOPHIL # BLD AUTO: 0.03 10*3/MM3 (ref 0–0.4)
EOSINOPHIL NFR BLD AUTO: 0.5 % (ref 0.3–6.2)
ERYTHROCYTE [DISTWIDTH] IN BLOOD BY AUTOMATED COUNT: 11.7 % (ref 12.3–15.4)
ERYTHROCYTE [SEDIMENTATION RATE] IN BLOOD: 36 MM/HR (ref 0–20)
GFR SERPL CREATININE-BSD FRML MDRD: 103 ML/MIN/1.73
GLOBULIN UR ELPH-MCNC: 3.3 GM/DL
GLUCOSE SERPL-MCNC: 98 MG/DL (ref 65–99)
HBA1C MFR BLD: 5 % (ref 3.5–5.6)
HCT VFR BLD AUTO: 41.7 % (ref 34–46.6)
HCV AB SER DONR QL: NORMAL
HDLC SERPL-MCNC: 47 MG/DL (ref 40–60)
HGB BLD-MCNC: 14.6 G/DL (ref 12–15.9)
HIV1+2 AB SER QL: NORMAL
IMM GRANULOCYTES # BLD AUTO: 0.02 10*3/MM3 (ref 0–0.05)
IMM GRANULOCYTES NFR BLD AUTO: 0.3 % (ref 0–0.5)
LDLC SERPL CALC-MCNC: 131 MG/DL (ref 0–100)
LDLC/HDLC SERPL: 2.74 {RATIO}
LYMPHOCYTES # BLD AUTO: 2.19 10*3/MM3 (ref 0.7–3.1)
LYMPHOCYTES NFR BLD AUTO: 32.9 % (ref 19.6–45.3)
MCH RBC QN AUTO: 32.5 PG (ref 26.6–33)
MCHC RBC AUTO-ENTMCNC: 35 G/DL (ref 31.5–35.7)
MCV RBC AUTO: 92.9 FL (ref 79–97)
MONOCYTES # BLD AUTO: 0.53 10*3/MM3 (ref 0.1–0.9)
MONOCYTES NFR BLD AUTO: 8 % (ref 5–12)
NEUTROPHILS NFR BLD AUTO: 3.85 10*3/MM3 (ref 1.7–7)
NEUTROPHILS NFR BLD AUTO: 57.8 % (ref 42.7–76)
NRBC BLD AUTO-RTO: 0 /100 WBC (ref 0–0.2)
PLATELET # BLD AUTO: 464 10*3/MM3 (ref 140–450)
PMV BLD AUTO: 8.9 FL (ref 6–12)
POTASSIUM SERPL-SCNC: 4 MMOL/L (ref 3.5–5.2)
PROT SERPL-MCNC: 8 G/DL (ref 6–8.5)
RBC # BLD AUTO: 4.49 10*6/MM3 (ref 3.77–5.28)
SODIUM SERPL-SCNC: 137 MMOL/L (ref 136–145)
TRIGL SERPL-MCNC: 126 MG/DL (ref 0–150)
VLDLC SERPL-MCNC: 23 MG/DL (ref 5–40)
WBC # BLD AUTO: 6.65 10*3/MM3 (ref 3.4–10.8)

## 2021-03-05 PROCEDURE — 86803 HEPATITIS C AB TEST: CPT | Performed by: PREVENTIVE MEDICINE

## 2021-03-05 PROCEDURE — 86140 C-REACTIVE PROTEIN: CPT | Performed by: PREVENTIVE MEDICINE

## 2021-03-05 PROCEDURE — 83036 HEMOGLOBIN GLYCOSYLATED A1C: CPT | Performed by: PREVENTIVE MEDICINE

## 2021-03-05 PROCEDURE — 85025 COMPLETE CBC W/AUTO DIFF WBC: CPT | Performed by: PREVENTIVE MEDICINE

## 2021-03-05 PROCEDURE — 82306 VITAMIN D 25 HYDROXY: CPT | Performed by: PREVENTIVE MEDICINE

## 2021-03-05 PROCEDURE — 80053 COMPREHEN METABOLIC PANEL: CPT | Performed by: PREVENTIVE MEDICINE

## 2021-03-05 PROCEDURE — 36415 COLL VENOUS BLD VENIPUNCTURE: CPT | Performed by: PREVENTIVE MEDICINE

## 2021-03-05 PROCEDURE — G0432 EIA HIV-1/HIV-2 SCREEN: HCPCS | Performed by: PREVENTIVE MEDICINE

## 2021-03-05 PROCEDURE — 85652 RBC SED RATE AUTOMATED: CPT | Performed by: PREVENTIVE MEDICINE

## 2021-03-05 PROCEDURE — 80061 LIPID PANEL: CPT | Performed by: PREVENTIVE MEDICINE

## 2021-03-05 NOTE — PROGRESS NOTES
Venipuncture Blood Specimen Collection  Venipuncture performed in Right & Left medial antecubital area   by Yesenia Bautista with good hemostasis. Patient tolerated the procedure well without complications.   03/05/21   Yesenia Smith MD

## 2021-03-07 ENCOUNTER — TELEPHONE (OUTPATIENT)
Dept: FAMILY MEDICINE CLINIC | Facility: CLINIC | Age: 36
End: 2021-03-07

## 2021-03-07 NOTE — TELEPHONE ENCOUNTER
HU TO RESD  Sed rate still slightly elevated but CRP is another inflammatory marker is now negative-may be due to recent UTI.  Any joint pain or swelling? Any fever?Platelets are still high but improving and almost ULN.  Recheck within 3 months.  Total and bad cholesterol are still elevated-can she do more with diet and exercise?  Vitamin D is low so increase or start otc.

## 2021-03-08 PROBLEM — H43.819 POSTERIOR VITREOUS DETACHMENT: Status: ACTIVE | Noted: 2020-04-16

## 2021-03-08 PROBLEM — H40.9 GLAUCOMA: Status: ACTIVE | Noted: 2020-06-16

## 2021-03-08 PROBLEM — H33.20 RETINAL DETACHMENT: Status: ACTIVE | Noted: 2020-05-14

## 2021-03-08 PROBLEM — H27.01 APHAKIA OF RIGHT EYE: Status: ACTIVE | Noted: 2020-05-14

## 2021-03-09 ENCOUNTER — OFFICE VISIT (OUTPATIENT)
Dept: FAMILY MEDICINE CLINIC | Facility: CLINIC | Age: 36
End: 2021-03-09

## 2021-03-09 VITALS
BODY MASS INDEX: 30.29 KG/M2 | HEIGHT: 62 IN | DIASTOLIC BLOOD PRESSURE: 78 MMHG | HEART RATE: 80 BPM | SYSTOLIC BLOOD PRESSURE: 114 MMHG | OXYGEN SATURATION: 98 % | WEIGHT: 164.6 LBS | TEMPERATURE: 98.4 F

## 2021-03-09 DIAGNOSIS — Z00.01 ENCOUNTER FOR ANNUAL GENERAL MEDICAL EXAMINATION WITH ABNORMAL FINDINGS IN ADULT: Primary | ICD-10-CM

## 2021-03-09 DIAGNOSIS — D75.839 THROMBOCYTOSIS: ICD-10-CM

## 2021-03-09 DIAGNOSIS — E53.8 VITAMIN B12 DEFICIENCY: ICD-10-CM

## 2021-03-09 DIAGNOSIS — E66.09 CLASS 1 OBESITY DUE TO EXCESS CALORIES WITHOUT SERIOUS COMORBIDITY WITH BODY MASS INDEX (BMI) OF 30.0 TO 30.9 IN ADULT: ICD-10-CM

## 2021-03-09 DIAGNOSIS — H40.9 GLAUCOMA OF RIGHT EYE, UNSPECIFIED GLAUCOMA TYPE: ICD-10-CM

## 2021-03-09 DIAGNOSIS — E78.5 HYPERLIPIDEMIA, UNSPECIFIED HYPERLIPIDEMIA TYPE: ICD-10-CM

## 2021-03-09 DIAGNOSIS — F41.8 MIXED ANXIETY DEPRESSIVE DISORDER: ICD-10-CM

## 2021-03-09 DIAGNOSIS — E55.9 VITAMIN D DEFICIENCY: ICD-10-CM

## 2021-03-09 PROCEDURE — 99395 PREV VISIT EST AGE 18-39: CPT | Performed by: PREVENTIVE MEDICINE

## 2021-03-09 PROCEDURE — 99212 OFFICE O/P EST SF 10 MIN: CPT | Performed by: PREVENTIVE MEDICINE

## 2021-03-09 NOTE — PROGRESS NOTES
"Subjective   Chrystal Walters is a 35 y.o. female presents for   Chief Complaint   Patient presents with   • Annual Exam       Health Maintenance Due   Topic Date Due   • TDAP/TD VACCINES (1 - Tdap) Never done       35-year-old white female presents for her annual wellness.  She has been advised to wear seatbelt and to use sunscreen.  Patient is also here checking up on multiple chronic health conditions.  These are stable and have improved except for her vision.  She had a retinal detachment and glaucoma in her right eye and has been seeing the glaucoma specialist it is presently being controlled.  Patient has lost 34 pounds and feels much improved.  Patient is uncertain if she will be getting her Covid vaccination.  Patient did not get the scan of her spleen due to Covid.  She will get the scan of the spleen and be referred back to Dr. Flynn and that she still has thrombocytosis.  She feels well has had no fever chills night sweats.       Vitals:    03/09/21 0845 03/09/21 0846   BP: 111/75 114/78   BP Location: Left arm Right arm   Patient Position: Sitting Sitting   Cuff Size: Adult Adult   Pulse: 80    Temp: 98.4 °F (36.9 °C)    SpO2: 98%    Weight: 74.7 kg (164 lb 9.6 oz)    Height: 157.5 cm (62.01\")      Body mass index is 30.1 kg/m².    Current Outpatient Medications on File Prior to Visit   Medication Sig Dispense Refill   • cephalexin (KEFLEX) 500 MG capsule Take 1 capsule by mouth 3 (Three) Times a Day. 30 capsule 0   • Cholecalciferol (VITAMIN D3) 1000 units capsule Take 4,000 Units by mouth Daily. Take four  gummies daily(1000 each)     • Cyanocobalamin ([employer]S B12 GUMMIES) 500 MCG chewable tablet Chew 500 mg Daily.     • Multiple Vitamins-Minerals (ONE DAILY MULTIVITAMIN WOMEN) tablet Take 1 tablet by mouth Daily.     • phenazopyridine (Pyridium) 100 MG tablet Take 1 tablet by mouth 3 (Three) Times a Day. 6 tablet 0     No current facility-administered medications on file prior to visit.       The following " portions of the patient's history were reviewed and updated as appropriate: allergies, current medications, past family history, past medical history, past social history, past surgical history and problem list.    Review of Systems   Constitutional: Negative.    HENT: Negative.  Negative for sinus pressure and sore throat.    Eyes: Positive for redness and visual disturbance.   Respiratory: Negative.  Negative for cough.    Cardiovascular: Negative.    Gastrointestinal: Negative.    Endocrine: Negative.    Genitourinary: Negative.    Musculoskeletal: Negative.  Negative for arthralgias and joint swelling.   Skin: Negative.    Allergic/Immunologic: Positive for environmental allergies.   Neurological: Negative.    Hematological: Negative.    Psychiatric/Behavioral: Negative.  Negative for depressed mood.       Objective   Physical Exam  Vitals reviewed.   Constitutional:       General: She is not in acute distress.     Appearance: She is well-developed. She is obese. She is not ill-appearing or toxic-appearing.   HENT:      Head: Normocephalic and atraumatic.      Right Ear: Tympanic membrane, ear canal and external ear normal.      Left Ear: Tympanic membrane, ear canal and external ear normal.      Nose: Nose normal.   Eyes:      Extraocular Movements: Extraocular movements intact.      Comments: Right eye has scleral erythema and pupil is enlarged.   Cardiovascular:      Rate and Rhythm: Normal rate and regular rhythm.      Heart sounds: Normal heart sounds.   Pulmonary:      Effort: Pulmonary effort is normal.      Breath sounds: Normal breath sounds.   Abdominal:      General: Bowel sounds are normal. There is no distension.      Palpations: Abdomen is soft. There is no mass.      Tenderness: There is no abdominal tenderness.   Musculoskeletal:         General: Normal range of motion.      Cervical back: Neck supple.   Skin:     General: Skin is warm.   Neurological:      General: No focal deficit present.       Mental Status: She is alert and oriented to person, place, and time.   Psychiatric:         Mood and Affect: Mood normal.         Behavior: Behavior normal.       PHQ-9 Total Score: 0    Assessment/Plan   Diagnoses and all orders for this visit:    1. Encounter for annual general medical examination with abnormal findings in adult (Primary)  Comments:  Patient advised to wear sunscreen and seatbelt.    2. Hyperlipidemia, unspecified hyperlipidemia type  Comments:  Patient has lost 36 pounds and has decreased saturated fats in her diet.  Orders:  -     Comprehensive Metabolic Panel; Future  -     Lipid Panel; Future    3. Mixed anxiety depressive disorder  Comments:  No problems with anxiety or depression since she has been exercising.  Orders:  -     TSH; Future    4. Vitamin D deficiency  Comments:  Taking daily  Orders:  -     Vitamin D 25 Hydroxy; Future    5. Thrombocytosis (CMS/HCC)  Comments:  US and F/U Dr. Lee  Orders:  -     Ambulatory Referral to Hematology / Oncology  -     CBC Auto Differential; Future  -     Sedimentation Rate; Future  -     C-reactive Protein; Future  -     Iron and TIBC; Future  -     US Abdomen Limited; Future    6. Vitamin B12 deficiency  Comments:  Takes vitamin B regularly  Orders:  -     Vitamin B12; Future    7. Class 1 obesity due to excess calories without serious comorbidity with body mass index (BMI) of 30.0 to 30.9 in adult  Comments:  Portion size and increasing exercise have been discussed.  Patient is presently on the keto diet.    8. Glaucoma of right eye, unspecified glaucoma type  Comments:  Retinal detachment with glaucoma patient had canal opened and is following regularly.        Patient Instructions   12 hour fast for labs 1 week before next visit

## 2021-03-10 ENCOUNTER — TELEPHONE (OUTPATIENT)
Dept: ONCOLOGY | Facility: CLINIC | Age: 36
End: 2021-03-10

## 2021-03-10 NOTE — TELEPHONE ENCOUNTER
SPOKE WITH PT TO MAKE AN APPT FOR REFERRAL. SHE SAID SHE NEEDED A REFERRAL FOR HER INS & WE USUALLY GOT IT FOR HER. I FOUND OUT WHEN A PT IS REFERED HERE THE REFFERING PROVIDER IS TO SEND THAT TO HER INS. I CALLED AND ONLY GOT TO L/M TO THAT EFFECT

## 2021-03-24 ENCOUNTER — HOSPITAL ENCOUNTER (OUTPATIENT)
Dept: ULTRASOUND IMAGING | Facility: HOSPITAL | Age: 36
End: 2021-03-24

## 2021-03-26 NOTE — PROGRESS NOTES
Telehealth    Video    You have chosen to receive care through a telehealth visit.  Do you consent to use a video/audio connection for your medical care today? Yes    Chief Complaint  Sleeping Problem (Sleep apnea treated with CPAP)    Subjective          Chrystal Walters presents to [facility] NEUROLOGY  History of Present Illness     This patient was previously diagnosed with obstructive sleep apnea.  Over the past year she had significant prolonged problems with her right eye and therefore was not able to use the CPAP device.    Pt has lost about 40, plans on losing 30 lbs more  With weight loss, no snoring and no day time fatigue     Sleep testing history:    On NPSG at [facility] , 3- patient had Moderate obstructive sleep apnea syndrome with apnea-hypopnea index of 20.7 per sleep hour, minimum SpO2 of 88%    The patient's hypersomnia has resolved       Objective   Vital Signs:   There were no vitals taken for this visit.    The patient was awake and alert her speech and language was normal mood was normal.    Result Review :                 Assessment and Plan    Diagnoses and all orders for this visit:    1. Obstructive sleep apnea (Primary)  -     Home Sleep Study; Future    This patient has history of mild to moderate obstructive sleep apnea.  She has lost 30 to 40 pounds and feels that she is at this time asymptomatic.    We will repeat the home sleep test and if this demonstrates significant sleep apnea that is more than 5 events per hour we will resume treatment with CPAP.  On pressure min 7 and max 12.      Follow Up   No follow-ups on file.  Patient was given instructions and counseling regarding her condition or for health maintenance advice. Please see specific information pulled into the AVS if appropriate.

## 2021-03-29 ENCOUNTER — TELEMEDICINE (OUTPATIENT)
Dept: NEUROLOGY | Facility: CLINIC | Age: 36
End: 2021-03-29

## 2021-03-29 DIAGNOSIS — G47.33 OBSTRUCTIVE SLEEP APNEA: Primary | ICD-10-CM

## 2021-03-29 PROCEDURE — 99213 OFFICE O/P EST LOW 20 MIN: CPT | Performed by: PSYCHIATRY & NEUROLOGY

## 2021-03-30 ENCOUNTER — TELEPHONE (OUTPATIENT)
Dept: NEUROLOGY | Facility: CLINIC | Age: 36
End: 2021-03-30

## 2021-03-30 DIAGNOSIS — G47.33 OBSTRUCTIVE SLEEP APNEA: Primary | ICD-10-CM

## 2021-03-30 NOTE — TELEPHONE ENCOUNTER
----- Message from Joseph F Seipel, MD sent at 3/29/2021  3:04 PM EDT -----  Adjust pressure to min 5 max 12  Will brothers  Nasal mask

## 2021-04-14 ENCOUNTER — APPOINTMENT (OUTPATIENT)
Dept: LAB | Facility: HOSPITAL | Age: 36
End: 2021-04-14

## 2021-04-23 ENCOUNTER — HOSPITAL ENCOUNTER (OUTPATIENT)
Dept: SLEEP MEDICINE | Facility: HOSPITAL | Age: 36
Discharge: HOME | End: 2021-04-23
Admitting: PSYCHIATRY & NEUROLOGY

## 2021-04-23 DIAGNOSIS — G47.33 OBSTRUCTIVE SLEEP APNEA: ICD-10-CM

## 2021-04-23 PROCEDURE — 95806 SLEEP STUDY UNATT&RESP EFFT: CPT | Performed by: PSYCHIATRY & NEUROLOGY

## 2021-04-23 PROCEDURE — 95806 SLEEP STUDY UNATT&RESP EFFT: CPT

## 2021-05-04 ENCOUNTER — TELEPHONE (OUTPATIENT)
Dept: NEUROLOGY | Facility: CLINIC | Age: 36
End: 2021-05-04

## 2021-05-04 DIAGNOSIS — G47.33 OBSTRUCTIVE SLEEP APNEA: Primary | ICD-10-CM

## 2021-05-24 ENCOUNTER — TELEPHONE (OUTPATIENT)
Dept: FAMILY MEDICINE CLINIC | Facility: CLINIC | Age: 36
End: 2021-05-24

## 2021-05-24 NOTE — TELEPHONE ENCOUNTER
Both of these referrals have been Approved on [facility]. Scanned approvals to chart. I called and LM on  with info. Advised to call back if she had any questions. Also, advised I would send this info on My Chart.

## 2021-05-24 NOTE — TELEPHONE ENCOUNTER
Caller: Chrystal Walters    Relationship to patient: Self    Best call back number: [phone]    Patient is needing: PATIENT STATES SHE SEES DR. SEIPEL (NEUROLOGY) AND DR. BAUTISTA (OPTOMOLOGY) AND NEEDS NEW REFERRALS PLACED EACH TIME THE PREVIOUS ONE EXPIRES. HER REFERRAL FOR DR. SEIPEL  ON 2021 AND DR. BAUTISTA WILL  IN  BUT SHE HAS AN APPOINTMENT IN November. PATIENT NEEDS NEW REFERRALS FOR BOTH DOCTORS FOR  BILLING. CALLBACK REQUESTED ONCE REFERRALS ARE PLACED.

## 2021-09-03 ENCOUNTER — CLINICAL SUPPORT (OUTPATIENT)
Dept: FAMILY MEDICINE CLINIC | Facility: CLINIC | Age: 36
End: 2021-09-03

## 2021-09-03 DIAGNOSIS — E78.5 HYPERLIPIDEMIA, UNSPECIFIED HYPERLIPIDEMIA TYPE: Primary | ICD-10-CM

## 2021-09-03 DIAGNOSIS — F41.8 MIXED ANXIETY DEPRESSIVE DISORDER: ICD-10-CM

## 2021-09-03 DIAGNOSIS — E66.9 CLASS 2 OBESITY WITH BODY MASS INDEX (BMI) OF 38.0 TO 38.9 IN ADULT, UNSPECIFIED OBESITY TYPE, UNSPECIFIED WHETHER SERIOUS COMORBIDITY PRESENT: ICD-10-CM

## 2021-09-03 DIAGNOSIS — E53.8 VITAMIN B12 DEFICIENCY: ICD-10-CM

## 2021-09-03 DIAGNOSIS — R70.0 ELEVATED SED RATE: ICD-10-CM

## 2021-09-03 DIAGNOSIS — E53.8 B12 DEFICIENCY: ICD-10-CM

## 2021-09-03 DIAGNOSIS — E55.9 VITAMIN D DEFICIENCY: ICD-10-CM

## 2021-09-03 DIAGNOSIS — D75.839 THROMBOCYTOSIS: ICD-10-CM

## 2021-09-03 LAB
25(OH)D3 SERPL-MCNC: 36.5 NG/ML
ALBUMIN SERPL-MCNC: 4.6 G/DL (ref 3.5–5.2)
ALBUMIN/GLOB SERPL: 1.8 G/DL
ALP SERPL-CCNC: 52 U/L (ref 39–117)
ALT SERPL W P-5'-P-CCNC: 17 U/L (ref 1–33)
ANION GAP SERPL CALCULATED.3IONS-SCNC: 12 MMOL/L (ref 5–15)
AST SERPL-CCNC: 15 U/L (ref 1–32)
BASOPHILS # BLD AUTO: 0.04 10*3/MM3 (ref 0–0.2)
BASOPHILS NFR BLD AUTO: 0.7 % (ref 0–1.5)
BILIRUB SERPL-MCNC: 0.5 MG/DL (ref 0–1.2)
BUN SERPL-MCNC: 13 MG/DL (ref 6–20)
BUN/CREAT SERPL: 20 (ref 7–25)
CALCIUM SPEC-SCNC: 9.5 MG/DL (ref 8.6–10.5)
CHLORIDE SERPL-SCNC: 100 MMOL/L (ref 98–107)
CHOLEST SERPL-MCNC: 189 MG/DL (ref 0–200)
CO2 SERPL-SCNC: 26 MMOL/L (ref 22–29)
CREAT SERPL-MCNC: 0.65 MG/DL (ref 0.57–1)
CRP SERPL-MCNC: <0.3 MG/DL (ref 0–0.5)
DEPRECATED RDW RBC AUTO: 41.1 FL (ref 37–54)
EOSINOPHIL # BLD AUTO: 0.09 10*3/MM3 (ref 0–0.4)
EOSINOPHIL NFR BLD AUTO: 1.5 % (ref 0.3–6.2)
ERYTHROCYTE [DISTWIDTH] IN BLOOD BY AUTOMATED COUNT: 11.9 % (ref 12.3–15.4)
ERYTHROCYTE [SEDIMENTATION RATE] IN BLOOD: 5 MM/HR (ref 0–20)
FERRITIN SERPL-MCNC: 355 NG/ML (ref 13–150)
GFR SERPL CREATININE-BSD FRML MDRD: 104 ML/MIN/1.73
GLOBULIN UR ELPH-MCNC: 2.5 GM/DL
GLUCOSE SERPL-MCNC: 84 MG/DL (ref 65–99)
HCT VFR BLD AUTO: 37.8 % (ref 34–46.6)
HDLC SERPL-MCNC: 58 MG/DL (ref 40–60)
HGB BLD-MCNC: 12.7 G/DL (ref 12–15.9)
IMM GRANULOCYTES # BLD AUTO: 0.01 10*3/MM3 (ref 0–0.05)
IMM GRANULOCYTES NFR BLD AUTO: 0.2 % (ref 0–0.5)
IRON 24H UR-MRATE: 103 MCG/DL (ref 37–145)
IRON SATN MFR SERPL: 29 % (ref 20–50)
LDLC SERPL CALC-MCNC: 119 MG/DL (ref 0–100)
LDLC/HDLC SERPL: 2.03 {RATIO}
LYMPHOCYTES # BLD AUTO: 2.65 10*3/MM3 (ref 0.7–3.1)
LYMPHOCYTES NFR BLD AUTO: 43.4 % (ref 19.6–45.3)
MAGNESIUM SERPL-MCNC: 2.2 MG/DL (ref 1.6–2.6)
MCH RBC QN AUTO: 32.2 PG (ref 26.6–33)
MCHC RBC AUTO-ENTMCNC: 33.6 G/DL (ref 31.5–35.7)
MCV RBC AUTO: 95.9 FL (ref 79–97)
MONOCYTES # BLD AUTO: 0.58 10*3/MM3 (ref 0.1–0.9)
MONOCYTES NFR BLD AUTO: 9.5 % (ref 5–12)
NEUTROPHILS NFR BLD AUTO: 2.73 10*3/MM3 (ref 1.7–7)
NEUTROPHILS NFR BLD AUTO: 44.7 % (ref 42.7–76)
NRBC BLD AUTO-RTO: 0.2 /100 WBC (ref 0–0.2)
PLATELET # BLD AUTO: 475 10*3/MM3 (ref 140–450)
PMV BLD AUTO: 9 FL (ref 6–12)
POTASSIUM SERPL-SCNC: 4.3 MMOL/L (ref 3.5–5.2)
PROT SERPL-MCNC: 7.1 G/DL (ref 6–8.5)
RBC # BLD AUTO: 3.94 10*6/MM3 (ref 3.77–5.28)
SODIUM SERPL-SCNC: 138 MMOL/L (ref 136–145)
TIBC SERPL-MCNC: 356 MCG/DL (ref 298–536)
TRANSFERRIN SERPL-MCNC: 239 MG/DL (ref 200–360)
TRIGL SERPL-MCNC: 67 MG/DL (ref 0–150)
TSH SERPL DL<=0.05 MIU/L-ACNC: 1.83 UIU/ML (ref 0.27–4.2)
VIT B12 BLD-MCNC: 1793 PG/ML (ref 211–946)
VLDLC SERPL-MCNC: 12 MG/DL (ref 5–40)
WBC # BLD AUTO: 6.1 10*3/MM3 (ref 3.4–10.8)

## 2021-09-03 PROCEDURE — 80053 COMPREHEN METABOLIC PANEL: CPT | Performed by: PREVENTIVE MEDICINE

## 2021-09-03 PROCEDURE — 85025 COMPLETE CBC W/AUTO DIFF WBC: CPT | Performed by: PREVENTIVE MEDICINE

## 2021-09-03 PROCEDURE — 82728 ASSAY OF FERRITIN: CPT | Performed by: PREVENTIVE MEDICINE

## 2021-09-03 PROCEDURE — 83735 ASSAY OF MAGNESIUM: CPT | Performed by: PREVENTIVE MEDICINE

## 2021-09-03 PROCEDURE — 86140 C-REACTIVE PROTEIN: CPT | Performed by: PREVENTIVE MEDICINE

## 2021-09-03 PROCEDURE — 80061 LIPID PANEL: CPT | Performed by: PREVENTIVE MEDICINE

## 2021-09-03 PROCEDURE — 84443 ASSAY THYROID STIM HORMONE: CPT | Performed by: PREVENTIVE MEDICINE

## 2021-09-03 PROCEDURE — 83036 HEMOGLOBIN GLYCOSYLATED A1C: CPT | Performed by: PREVENTIVE MEDICINE

## 2021-09-03 PROCEDURE — 85652 RBC SED RATE AUTOMATED: CPT | Performed by: PREVENTIVE MEDICINE

## 2021-09-03 PROCEDURE — 83540 ASSAY OF IRON: CPT | Performed by: PREVENTIVE MEDICINE

## 2021-09-03 PROCEDURE — 84466 ASSAY OF TRANSFERRIN: CPT | Performed by: PREVENTIVE MEDICINE

## 2021-09-03 PROCEDURE — 36415 COLL VENOUS BLD VENIPUNCTURE: CPT | Performed by: PREVENTIVE MEDICINE

## 2021-09-03 PROCEDURE — 82306 VITAMIN D 25 HYDROXY: CPT | Performed by: PREVENTIVE MEDICINE

## 2021-09-03 PROCEDURE — 82607 VITAMIN B-12: CPT | Performed by: PREVENTIVE MEDICINE

## 2021-09-03 NOTE — PROGRESS NOTES
Venipuncture Blood Specimen Collection  Venipuncture performed in left arm by Sil Dunbar MA with good hemostasis. Patient tolerated the procedure well without complications.   09/03/21   SAHRA Mitchell MD

## 2021-09-06 DIAGNOSIS — R79.89 ELEVATED FERRITIN: Primary | ICD-10-CM

## 2021-09-06 LAB — HBA1C MFR BLD: 5.1 % (ref 3.5–5.6)

## 2021-09-07 ENCOUNTER — TELEPHONE (OUTPATIENT)
Dept: FAMILY MEDICINE CLINIC | Facility: CLINIC | Age: 36
End: 2021-09-07

## 2021-09-07 ENCOUNTER — TELEPHONE (OUTPATIENT)
Dept: ONCOLOGY | Facility: CLINIC | Age: 36
End: 2021-09-07

## 2021-09-07 NOTE — PROGRESS NOTES
Vitamin B12 is elevated so can decrease otc dose.  Referral placed back to hematology for elevated Ferritin, platelets and spleenomegaly.  Bad cholesterol 119 and goal is below 100 so decrease sat fats and walk to see if can get to goal without meds.

## 2021-09-07 NOTE — TELEPHONE ENCOUNTER
HUB TO READ    ----- Message from Belinda Smith MD sent at 9/7/2021  7:09 AM EDT -----    Vitamin B12 is elevated so can decrease otc dose.  Referral placed back to hematology for elevated Ferritin, platelets and spleenomegaly.  Bad cholesterol 119 and goal is below 100 so decrease sat fats and walk to see if can get to goal without meds.

## 2021-09-07 NOTE — TELEPHONE ENCOUNTER
Caller: Chrystal Walters    Relationship to patient: Self    Best call back number: [phone]    Chief complaint: PT TO SCHED WITH THE OFFICE    Type of visit: LAB AN D FU    Requested date: FLEXIBLE TO LOCATION.  NOT AVAILABLE 11-18/11-19    If rescheduling, when is the original appointment:     Additional notes

## 2021-09-08 ENCOUNTER — TELEPHONE (OUTPATIENT)
Dept: ONCOLOGY | Facility: CLINIC | Age: 36
End: 2021-09-08

## 2021-09-10 ENCOUNTER — TELEMEDICINE (OUTPATIENT)
Dept: FAMILY MEDICINE CLINIC | Facility: CLINIC | Age: 36
End: 2021-09-10

## 2021-09-10 VITALS — WEIGHT: 145 LBS | BODY MASS INDEX: 26.51 KG/M2

## 2021-09-10 DIAGNOSIS — G47.30 SLEEP APNEA, UNSPECIFIED TYPE: ICD-10-CM

## 2021-09-10 DIAGNOSIS — M54.50 LOW BACK PAIN, UNSPECIFIED BACK PAIN LATERALITY, UNSPECIFIED CHRONICITY, UNSPECIFIED WHETHER SCIATICA PRESENT: ICD-10-CM

## 2021-09-10 DIAGNOSIS — F41.8 MIXED ANXIETY DEPRESSIVE DISORDER: ICD-10-CM

## 2021-09-10 DIAGNOSIS — G89.29 CHRONIC THORACIC BACK PAIN, UNSPECIFIED BACK PAIN LATERALITY: ICD-10-CM

## 2021-09-10 DIAGNOSIS — R79.89 ELEVATED FERRITIN: ICD-10-CM

## 2021-09-10 DIAGNOSIS — M54.6 CHRONIC THORACIC BACK PAIN, UNSPECIFIED BACK PAIN LATERALITY: ICD-10-CM

## 2021-09-10 DIAGNOSIS — D75.839 THROMBOCYTOSIS: Primary | ICD-10-CM

## 2021-09-10 DIAGNOSIS — E78.5 HYPERLIPIDEMIA, UNSPECIFIED HYPERLIPIDEMIA TYPE: ICD-10-CM

## 2021-09-10 PROBLEM — R70.0 ELEVATED SED RATE: Status: RESOLVED | Noted: 2020-02-04 | Resolved: 2021-09-10

## 2021-09-10 PROCEDURE — 99213 OFFICE O/P EST LOW 20 MIN: CPT | Performed by: PREVENTIVE MEDICINE

## 2021-09-10 NOTE — PATIENT INSTRUCTIONS
Health Maintenance Due   Topic Date Due   • COVID-19 Vaccine (1) Never done   • TDAP/TD VACCINES (1 - Tdap) Never done     Call if anxiety worsens.  Behavioral health, hematology, and ortho should call to set up appointments after approved.

## 2021-09-11 NOTE — PROGRESS NOTES
Subjective   Chrystal Walters is a 35 y.o. female presents for   Chief Complaint   Patient presents with   • Hyperlipidemia     fu   You have chosen to receive care through a telehealth visit.  Do you consent to use a video/audio connection for your medical care today? Yes    Patient presents today for a video visit due to the fact that she is concerned over her recent elevation of her ferritin this is caused her much anxiety.  We have placed the referral to the hematologist again she failed to follow-up for splenomegaly is thrombocytosis and elevated ferritin in the past.  Patient is also complaining of upper and lower back pain that is of chronic nature and she wishes referral to chiropractic we advised that we would prefer that she go to physical therapy which she does not want to do so we will image her back which has not been done for years and have her follow-up with orthopedist with possible referral to pain management or neurosurgery if Ortho was concerned.  Health Maintenance Due   Topic Date Due   • COVID-19 Vaccine (1) Never done   • TDAP/TD VACCINES (1 - Tdap) Never done       History of Present Illness     Vitals:    09/10/21 1300   Weight: 65.8 kg (145 lb)     Body mass index is 26.51 kg/m².    Current Outpatient Medications on File Prior to Visit   Medication Sig Dispense Refill   • Cholecalciferol (VITAMIN D3) 1000 units capsule Take 4,000 Units by mouth Daily. Take four  gummies daily(1000 each)     • Multiple Vitamins-Minerals (ONE DAILY MULTIVITAMIN WOMEN) tablet Take 1 tablet by mouth Daily.       No current facility-administered medications on file prior to visit.       The following portions of the patient's history were reviewed and updated as appropriate: allergies, current medications, past family history, past medical history, past social history, past surgical history and problem list.    Review of Systems   Musculoskeletal: Positive for back pain.   Psychiatric/Behavioral: The patient is  nervous/anxious.        Objective   Physical Exam   Constitutional: She appears well-developed and well-nourished. No distress.   HENT:   Head: Normocephalic and atraumatic.   Right Ear: External ear normal.   Left Ear: External ear normal.   Nose: Nose normal.   Eyes: Pupils are equal, round, and reactive to light. Conjunctivae and EOM are normal.   Pulmonary/Chest: Effort normal.   Neurological: She is alert.   Skin: She is not diaphoretic.   Psychiatric: She has a normal mood and affect.   Vitals reviewed.       PHQ-9 Total Score:      Assessment/Plan   Diagnoses and all orders for this visit:    1. Thrombocytosis (CMS/HCC) (Primary)  Comments:  Patient will follow up with hematology oncology on splenomegaly thrombocytosis and increased ferritin.    2. Elevated ferritin  Comments:  Ferritin has doubled since last check.  Is still only 355.    3. Mixed anxiety depressive disorder  Comments:  Patient is having anxiety over her health condition does not wish any medications presently will call if that changes.  No HI or SI  Orders:  -     Ambulatory Referral to Behavioral Health    4. Hyperlipidemia, unspecified hyperlipidemia type  Comments:  Patient is watching saturated fats    5. Sleep apnea, unspecified type  Comments:  Machine recalled.  Sleep apnea was mild at best.  She has not noticed any increase in daytime sleepiness.    6. Low back pain, unspecified back pain laterality, unspecified chronicity, unspecified whether sciatica present  Comments:  Low back pain for many years wants referral to chiropractic.  Have advised imaging and Ortho evaluation.  No bowel or bladder control issues  Orders:  -     Ambulatory Referral to Orthopedic Surgery    7. Chronic thoracic back pain, unspecified back pain laterality  Comments:  Upper back pain for years wishes chiropractic referral have advised imaging and follow-up orthopedist.  Orders:  -     Ambulatory Referral to Orthopedic Surgery        Patient Instructions      Health Maintenance Due   Topic Date Due   • COVID-19 Vaccine (1) Never done   • TDAP/TD VACCINES (1 - Tdap) Never done     Call if anxiety worsens.  Behavioral health, hematology, and ortho should call to set up appointments after approved.

## 2021-09-23 ENCOUNTER — TELEPHONE (OUTPATIENT)
Dept: FAMILY MEDICINE CLINIC | Facility: CLINIC | Age: 36
End: 2021-09-23

## 2021-09-23 NOTE — TELEPHONE ENCOUNTER
Please call patient and advise we can image her back and refer to pain management or neurosurgeon as ortho is not seeing back pain.  How long has her back been bothering her?  Advise we see her in office to check

## 2021-09-23 NOTE — TELEPHONE ENCOUNTER
- YOU REFERRED PATIENT TO ORTHO SURGERY IN [place]. HUB SENT BACK THIS NOTE.     --“NO DR AT [facility] [place] SEES SPINE DX - CLOSEST IS [facility] [place]. IF NEED BE, CAN YOU PLEASE SWITCH THE LOCATION TO BE SEEN AT [place]?” --    -I CALLED ORTHO TO VERIFY WITH NA OFFICE. NEED TO REFFER TO NEUROSURGERY IN [place] OR ORTHO IN [place].

## 2021-09-29 ENCOUNTER — LAB (OUTPATIENT)
Dept: LAB | Facility: HOSPITAL | Age: 36
End: 2021-09-29

## 2021-09-29 ENCOUNTER — CONSULT (OUTPATIENT)
Dept: ONCOLOGY | Facility: CLINIC | Age: 36
End: 2021-09-29

## 2021-09-29 VITALS
TEMPERATURE: 97.8 F | HEART RATE: 71 BPM | DIASTOLIC BLOOD PRESSURE: 81 MMHG | HEIGHT: 62 IN | BODY MASS INDEX: 27.23 KG/M2 | WEIGHT: 148 LBS | SYSTOLIC BLOOD PRESSURE: 111 MMHG | RESPIRATION RATE: 18 BRPM

## 2021-09-29 DIAGNOSIS — E61.1 IRON DEFICIENCY: ICD-10-CM

## 2021-09-29 DIAGNOSIS — D75.839 THROMBOCYTOSIS: Primary | ICD-10-CM

## 2021-09-29 DIAGNOSIS — D75.839 THROMBOCYTOSIS: ICD-10-CM

## 2021-09-29 PROBLEM — R07.89 CHEST WALL PAIN: Status: ACTIVE | Noted: 2021-09-29

## 2021-09-29 PROBLEM — F41.9 ANXIETY: Status: ACTIVE | Noted: 2021-09-29

## 2021-09-29 LAB
BASOPHILS # BLD AUTO: 0.03 10*3/MM3 (ref 0–0.2)
BASOPHILS NFR BLD AUTO: 0.4 % (ref 0–1.5)
DEPRECATED RDW RBC AUTO: 40.3 FL (ref 37–54)
EOSINOPHIL # BLD AUTO: 0.1 10*3/MM3 (ref 0–0.4)
EOSINOPHIL NFR BLD AUTO: 1.4 % (ref 0.3–6.2)
ERYTHROCYTE [DISTWIDTH] IN BLOOD BY AUTOMATED COUNT: 11.9 % (ref 12.3–15.4)
HCT VFR BLD AUTO: 40.6 % (ref 34–46.6)
HGB BLD-MCNC: 14 G/DL (ref 12–15.9)
LYMPHOCYTES # BLD AUTO: 2.91 10*3/MM3 (ref 0.7–3.1)
LYMPHOCYTES NFR BLD AUTO: 39.5 % (ref 19.6–45.3)
MCH RBC QN AUTO: 32.5 PG (ref 26.6–33)
MCHC RBC AUTO-ENTMCNC: 34.5 G/DL (ref 31.5–35.7)
MCV RBC AUTO: 94.2 FL (ref 79–97)
MONOCYTES # BLD AUTO: 0.7 10*3/MM3 (ref 0.1–0.9)
MONOCYTES NFR BLD AUTO: 9.5 % (ref 5–12)
NEUTROPHILS NFR BLD AUTO: 3.63 10*3/MM3 (ref 1.7–7)
NEUTROPHILS NFR BLD AUTO: 49.2 % (ref 42.7–76)
PLATELET # BLD AUTO: 240 10*3/MM3 (ref 140–450)
PMV BLD AUTO: 9.3 FL (ref 6–12)
RBC # BLD AUTO: 4.31 10*6/MM3 (ref 3.77–5.28)
WBC # BLD AUTO: 7.37 10*3/MM3 (ref 3.4–10.8)

## 2021-09-29 PROCEDURE — 85025 COMPLETE CBC W/AUTO DIFF WBC: CPT

## 2021-09-29 PROCEDURE — 99214 OFFICE O/P EST MOD 30 MIN: CPT | Performed by: INTERNAL MEDICINE

## 2021-09-29 RX ORDER — IBUPROFEN 800 MG/1
TABLET ORAL
COMMUNITY
Start: 2021-09-13

## 2021-09-29 NOTE — PROGRESS NOTES
Hematology/Oncology Outpatient Follow Up    PATIENT NAME:Chrystal Walters  :1985  MRN: [id number]  PRIMARY CARE PHYSICIAN: Belinda Smith MD  REFERRING PHYSICIAN: Belinda Smith MD    Chief Complaint   Patient presents with   • Appointment     Thrombocytosis         HISTORY OF PRESENT ILLNESS:   Ms. Walters is a 34-year-old female who presents to our office on 19 for consultation regarding thrombocytosis.  She had a CBC on 10/1/18 that showed a platelet count of 487,000.  She is consulted for thrombocytosis.    • 10/1/18 - WBC 6.3, hemoglobin 12.9, platelet count 487,000, MCV 92.8.  Magnesium 1.8.  B12 211.  TSH 1.37.  ESR 48 (H).  Vitamin D 22(L).  Creatinine 0.7, BUN 11.  LFTs are normal.    • 18 - WBC 5.2, hemoglobin 13.1, platelet count 471,000, MCV 94.4.      2019 - The patient presents for initial consultation.  She denies any history of strokes.  She denies any fevers, chills, night sweats, weight loss or lymph node swelling.  She denies any history of thrombosis.  She does report slight excess bruising.  She denies any personal or family history of lupus or any connective tissue disorder.  She denies any recent infection.  Patient denies any bleeding per rectum.  She does not have any menstrual cycles since her hysterectomy.  • 19 - WBC 8.7, hemoglobin 12.6, platelet count 488,000, MCV 93.1.    • 19 - B12 259.  CRP 0.41.  Ferritin 108.  Folate 20.6.  Iron saturation 14% (L), TIBC 338, serum iron 49.  ESR 41 (H).  Antiparietal cell antibodies negative.  Intrinsic factor antibodies negative.  Peripheral blood smear:  Thrombocytosis.  No immature cells noted.  • 19 - WBC 9.3, hemoglobin 12.8, platelet count 462,000, MCV 94.5.    • [facility] by [facility]:  Normal.  No mutations detected.    • 19 - WBC 8.3, hemoglobin 13.6, platelet count 453,000, MCV 92.8.  Neutrophils 6590.  Creatinine 0.7, BUN 12.  LFTs are normal.     • 19 - Chest x-ray:  Negative.   • 2020 Obi 2 was negative  • 2020 patient had a ferritin level which was 195, TIBC was normal at 374, iron saturation was low at 13%, white count was 9.7, hemoglobin 12.9 and platelets were 508 differentials were 58% neutrophils, 32% lymphocytes, there was no monocytosis, eosinophilia or basophilia.  Sed rate was elevated to 48  • 2020-patient had a uric acid level which was normal, LDH was normal, soluble transferrin receptor assay was normal and BCR ABL was negative.  • Since her last visit patient has suffered retinal detachment and is scheduled for surgery 5/15/2020  • Ultrasound of the abdomen was recommended to evaluate for possible spleen enlargement.  Patient has not had this completed.  She is not ready to have additional work-up.    Past Medical History:   Diagnosis Date   • B12 deficiency    • Low back pain    • Obesity    • Sleep apnea    • Thrombocytosis (CMS/HCC)    • Vitamin D deficiency        Past Surgical History:   Procedure Laterality Date   •  SECTION     • CYSTOSCOPY     • EYE SURGERY     • GLAUCOMA SURGERY Right     2020   • HYSTERECTOMY     • RETINAL DETACHMENT SURGERY  2020         Current Outpatient Medications:   •  Cholecalciferol (VITAMIN D3) 1000 units capsule, Take 4,000 Units by mouth Daily. Take four  gummies daily(1000 each), Disp: , Rfl:   •  ibuprofen (ADVIL,MOTRIN) 800 MG tablet, , Disp: , Rfl:   •  Multiple Vitamins-Minerals (ONE DAILY MULTIVITAMIN WOMEN) tablet, Take 1 tablet by mouth Daily., Disp: , Rfl:     No Known Allergies    Family History   Problem Relation Age of Onset   • Alcohol abuse Father    • Arthritis Father    • Diabetes Father    • Hyperlipidemia Father    • Anxiety disorder Mother        Cancer-related family history is not on file.    Social History     Tobacco Use   • Smoking status: Never Smoker   • Smokeless tobacco: Never Used   Vaping Use   • Vaping Use: Never used   Substance Use  "Topics   • Alcohol use: No   • Drug use: No       I have reviewed and confirmed the accuracy of the patient's history: as entered by the MA/LPN/RN. Susan Loredo MA 09/29/21       SUBJECTIVE:    Has no specific complaints today.  Patient has visual impairment on the right eye and is scheduled for retinal surgery for retinal detachment on 5/15/2020.      Patient is now ready to have her abdominal ultrasound        REVIEW OF SYSTEMS:  Review of Systems   Constitutional: Negative for chills and fever.   HENT: Negative for ear pain, mouth sores, nosebleeds and sore throat.    Eyes: Positive for visual disturbance. Negative for photophobia.        Right eye   Respiratory: Negative for wheezing and stridor.    Cardiovascular: Negative for chest pain and palpitations.   Gastrointestinal: Negative for abdominal pain, diarrhea, nausea and vomiting.   Endocrine: Negative for cold intolerance and heat intolerance.   Genitourinary: Negative for dysuria and hematuria.   Musculoskeletal: Positive for arthralgias. Negative for joint swelling and neck stiffness.   Skin: Negative for color change and rash.   Neurological: Negative for seizures and syncope.   Hematological: Negative for adenopathy.        No obvious bleeding   Psychiatric/Behavioral: Negative for agitation, confusion and hallucinations.       OBJECTIVE:    Vitals:    09/29/21 1104   BP: 111/81   Pulse: 71   Resp: 18   Temp: 97.8 °F (36.6 °C)   TempSrc: Infrared   Weight: 67.1 kg (148 lb)   Height: 157.5 cm (62\")   PainSc: 0-No pain     Body mass index is 27.07 kg/m².    ECOG  (0) Fully active, able to carry on all predisease performance without restriction    Physical Exam   Constitutional: She is oriented to person, place, and time. She appears well-developed and well-nourished.   HENT:   Head: Normocephalic.   Right Ear: External ear normal.   Left Ear: External ear normal.   Nose: Nose normal.   Eyes:   Right eye covered   Neck: Normal range of motion. "   Pulmonary/Chest: Effort normal.   Musculoskeletal: Normal range of motion.   Neurological: She is alert and oriented to person, place, and time.   Psychiatric: She has a normal mood and affect. Her behavior is normal.     I have reexamined the patient and the results are consistent with the previously documented exam. Andreia Flynn MD     RECENT LABS  WBC   Date Value Ref Range Status   09/29/2021 7.37 3.40 - 10.80 10*3/mm3 Final     RBC   Date Value Ref Range Status   09/29/2021 4.31 3.77 - 5.28 10*6/mm3 Final     Hemoglobin   Date Value Ref Range Status   09/29/2021 14.0 12.0 - 15.9 g/dL Final     Hematocrit   Date Value Ref Range Status   09/29/2021 40.6 34.0 - 46.6 % Final     MCV   Date Value Ref Range Status   09/29/2021 94.2 79.0 - 97.0 fL Final     MCH   Date Value Ref Range Status   09/29/2021 32.5 26.6 - 33.0 pg Final     MCHC   Date Value Ref Range Status   09/29/2021 34.5 31.5 - 35.7 g/dL Final     RDW   Date Value Ref Range Status   09/29/2021 11.9 (L) 12.3 - 15.4 % Final     RDW-SD   Date Value Ref Range Status   09/29/2021 40.3 37.0 - 54.0 fl Final     MPV   Date Value Ref Range Status   09/29/2021 9.3 6.0 - 12.0 fL Final     Platelets   Date Value Ref Range Status   09/29/2021 240 140 - 450 10*3/mm3 Final     Neutrophil %   Date Value Ref Range Status   09/29/2021 49.2 42.7 - 76.0 % Final     Lymphocyte %   Date Value Ref Range Status   09/29/2021 39.5 19.6 - 45.3 % Final     Monocyte %   Date Value Ref Range Status   09/29/2021 9.5 5.0 - 12.0 % Final     Eosinophil %   Date Value Ref Range Status   09/29/2021 1.4 0.3 - 6.2 % Final     Basophil %   Date Value Ref Range Status   09/29/2021 0.4 0.0 - 1.5 % Final     Immature Grans %   Date Value Ref Range Status   09/03/2021 0.2 0.0 - 0.5 % Final     Neutrophils, Absolute   Date Value Ref Range Status   09/29/2021 3.63 1.70 - 7.00 10*3/mm3 Final     Lymphocytes, Absolute   Date Value Ref Range Status   09/29/2021 2.91 0.70 - 3.10 10*3/mm3  Final     Monocytes, Absolute   Date Value Ref Range Status   09/29/2021 0.70 0.10 - 0.90 10*3/mm3 Final     Eosinophils, Absolute   Date Value Ref Range Status   09/29/2021 0.10 0.00 - 0.40 10*3/mm3 Final     Basophils, Absolute   Date Value Ref Range Status   09/29/2021 0.03 0.00 - 0.20 10*3/mm3 Final     Immature Grans, Absolute   Date Value Ref Range Status   09/03/2021 0.01 0.00 - 0.05 10*3/mm3 Final     nRBC   Date Value Ref Range Status   09/03/2021 0.2 0.0 - 0.2 /100 WBC Final       Lab Results   Component Value Date    GLUCOSE 84 09/03/2021    BUN 13 09/03/2021    CREATININE 0.65 09/03/2021    EGFRIFNONA 104 09/03/2021    BCR 20.0 09/03/2021    K 4.3 09/03/2021    CO2 26.0 09/03/2021    CALCIUM 9.5 09/03/2021    ALBUMIN 4.60 09/03/2021    LABIL2 1.3 05/29/2019    AST 15 09/03/2021    ALT 17 09/03/2021         Assessment/Plan     Thrombocytosis (CMS/HCC)  - CBC & Differential    Iron deficiency  - CBC & Differential      ASSESSMENT:    1. Thrombocytosis:  This is felt to be reactive thrombocytosis.  She has mild underlying inflammation and also has mild iron deficiency.  MPN panel by [facility] was negative.  Peripheral blood smear failed to show any malignant cells.  Platelets continue to remain high.  BCR ABL, uric acid, LDH were normal .Ultrasound of the abdomen to evaluate for splenomegaly is pending.  If  negative, patient would consider bone marrow biopsy under conscious sedation.  Ultrasound of the abdomen has been ordered.  Her CBC today is within normal limits with normal platelet count at 240.  If ultrasound is negative I will plan to see her again in 3 months.  If she has return of the thrombocytosis then would recommend bone marrow biopsy  2. Low-normal vitamin B12:  Intrinsic factor and parietal cell antibodies are negative.  She has been on B12 supplements.  3. Mild iron deficiency:  Patient had a hysterectomy and therefore, does not have any menorrhagia.  She denies any rectal bleeding.  She  has been on oral iron supplementation but I have asked patient to discontinue.  Her latest ferritin was 355 on 9/3/2021  4. Anxiety.  5. Possible lactose intolerance with GI problems.      PLAN:     1. Continue baby aspirin.    2. She will stop iron supplementation  3. Schedule ultrasound of the abdomen and pelvis  4. Follow-up with me in 3 months         I have reviewed labs results, imaging, vitals, and medications with the patient today. Will follow up in 3 months with me.

## 2021-10-06 ENCOUNTER — HOSPITAL ENCOUNTER (OUTPATIENT)
Dept: ULTRASOUND IMAGING | Facility: HOSPITAL | Age: 36
End: 2021-10-06

## 2021-10-08 ENCOUNTER — TELEMEDICINE (OUTPATIENT)
Dept: FAMILY MEDICINE CLINIC | Facility: CLINIC | Age: 36
End: 2021-10-08

## 2021-10-08 VITALS — BODY MASS INDEX: 26.52 KG/M2 | WEIGHT: 145 LBS | TEMPERATURE: 98.2 F

## 2021-10-08 DIAGNOSIS — J02.9 SORE THROAT: Primary | ICD-10-CM

## 2021-10-08 DIAGNOSIS — R68.89 CONGESTION OF THROAT: ICD-10-CM

## 2021-10-08 DIAGNOSIS — R51.9 ACUTE NONINTRACTABLE HEADACHE, UNSPECIFIED HEADACHE TYPE: ICD-10-CM

## 2021-10-08 PROBLEM — U07.1 CLINICAL DIAGNOSIS OF SEVERE ACUTE RESPIRATORY SYNDROME CORONAVIRUS 2 (SARS-COV-2) DISEASE: Status: ACTIVE | Noted: 2021-10-08

## 2021-10-08 PROCEDURE — 99213 OFFICE O/P EST LOW 20 MIN: CPT | Performed by: PREVENTIVE MEDICINE

## 2021-10-08 RX ORDER — EPINEPHRINE 1 MG/ML
0.3 INJECTION, SOLUTION INTRAMUSCULAR; SUBCUTANEOUS AS NEEDED
OUTPATIENT
Start: 2021-10-09

## 2021-10-08 RX ORDER — ACETAMINOPHEN 500 MG
500 TABLET ORAL EVERY 6 HOURS PRN
COMMUNITY

## 2021-10-08 RX ORDER — LIDOCAINE HYDROCHLORIDE 20 MG/ML
SOLUTION OROPHARYNGEAL
Qty: 200 ML | Refills: 0 | Status: SHIPPED | OUTPATIENT
Start: 2021-10-08 | End: 2022-03-22

## 2021-10-08 RX ORDER — DIPHENHYDRAMINE HCL 25 MG
50 TABLET ORAL ONCE AS NEEDED
OUTPATIENT
Start: 2021-10-09

## 2021-10-08 RX ORDER — METHYLPREDNISOLONE SODIUM SUCCINATE 125 MG/2ML
125 INJECTION, POWDER, LYOPHILIZED, FOR SOLUTION INTRAMUSCULAR; INTRAVENOUS AS NEEDED
OUTPATIENT
Start: 2021-10-09

## 2021-10-08 RX ORDER — DIPHENHYDRAMINE HYDROCHLORIDE 50 MG/ML
50 INJECTION INTRAMUSCULAR; INTRAVENOUS ONCE AS NEEDED
OUTPATIENT
Start: 2021-10-09

## 2021-10-08 RX ORDER — SODIUM CHLORIDE 9 MG/ML
30 INJECTION, SOLUTION INTRAVENOUS ONCE
Status: CANCELLED | OUTPATIENT
Start: 2021-10-09

## 2021-10-08 NOTE — PROGRESS NOTES
Subjective   Chrystal Walters is a 35 y.o. female presents for   Chief Complaint   Patient presents with   • Fever     DX: covid 10/6/21 sx started 10/5   • Cough   • Sore Throat   You have chosen to receive care through a telehealth visit.  Do you consent to use a video/audio connection for your medical care today? Yes patient was in her home by herself on her computer.  I was in my office with the door closed.  Patient states that her  developed Covid symptoms last week she started developing symptoms on 10 5 which included horrible headache cough producing green sputum severe throat pain slight temperature she had a red negative rapid test on 10 6 and a positive PCR test that came back on 10 7.  She has not been vaccinated.  Does not feel as though she could be pregnant.  Is having trouble swallowing due to the throat pain and has been using over-the-counter lozenges as well as ibuprofen.  Patient has been advised of pros and cons of the experimental REGEN-COV and she will call back if she wishes to to take this treatment.  Patient also knows if she develops shortness of breath she is to call 911 and get to the emergency room also she is having difficulty swallowing he cannot swallow liquids or pills she will do the same.      Health Maintenance Due   Topic Date Due   • COVID-19 Vaccine (1) Never done   • TDAP/TD VACCINES (1 - Tdap) Never done       History of Present Illness     Vitals:    10/08/21 1309   Temp: 98.2 °F (36.8 °C)   Weight: 65.8 kg (145 lb)     Body mass index is 26.52 kg/m².    Current Outpatient Medications on File Prior to Visit   Medication Sig Dispense Refill   • acetaminophen (TYLENOL) 500 MG tablet Take 500 mg by mouth Every 6 (Six) Hours As Needed for Mild Pain .     • ibuprofen (ADVIL,MOTRIN) 800 MG tablet      • Cholecalciferol (VITAMIN D3) 1000 units capsule Take 4,000 Units by mouth Daily. Take four  gummies daily(1000 each)     • Multiple Vitamins-Minerals (ONE DAILY  MULTIVITAMIN WOMEN) tablet Take 1 tablet by mouth Daily.       No current facility-administered medications on file prior to visit.       The following portions of the patient's history were reviewed and updated as appropriate: allergies, current medications, past family history, past medical history, past social history, past surgical history and problem list.    Review of Systems   Constitutional: Positive for fatigue and fever.   HENT: Positive for congestion and sore throat.    Respiratory: Positive for cough.        Objective   Physical Exam   Constitutional: She appears well-developed and well-nourished. No distress.   HENT:   Head: Normocephalic and atraumatic.   Right Ear: External ear normal.   Left Ear: External ear normal.   Nose: Nose normal.   Eyes: Pupils are equal, round, and reactive to light. Conjunctivae and EOM are normal.   Pulmonary/Chest: Effort normal.   Neurological: She is alert.   Skin: She is not diaphoretic.   Psychiatric: She has a normal mood and affect.   Vitals reviewed.       PHQ-9 Total Score:      Assessment/Plan   Diagnoses and all orders for this visit:    1. Sore throat (Primary)  Comments:  Continue ibuprofen.  Teaspoonful of salt in 4 ounces of water gargle frequently and Viscous Xylocaine 4 times a day.    2. Congestion of throat  Comments:  Cough drops to continue ibuprofen for fever rest and fluids    3. Acute nonintractable headache, unspecified headache type  Comments:  Continue ibuprofen.  And consider REGEN-COV treatment    Other orders  -     Lidocaine Viscous HCl (XYLOCAINE) 2 % solution; Gargle and spit out after  Dispense: 200 mL; Refill: 0        There are no Patient Instructions on file for this visit.

## 2021-10-09 ENCOUNTER — HOSPITAL ENCOUNTER (OUTPATIENT)
Dept: INFUSION THERAPY | Facility: HOSPITAL | Age: 36
Discharge: HOME | End: 2021-10-09
Admitting: PREVENTIVE MEDICINE

## 2021-10-09 VITALS
OXYGEN SATURATION: 95 % | HEIGHT: 62 IN | WEIGHT: 146 LBS | HEART RATE: 79 BPM | BODY MASS INDEX: 26.87 KG/M2 | DIASTOLIC BLOOD PRESSURE: 63 MMHG | TEMPERATURE: 96 F | SYSTOLIC BLOOD PRESSURE: 109 MMHG | RESPIRATION RATE: 15 BRPM

## 2021-10-09 DIAGNOSIS — U07.1 CLINICAL DIAGNOSIS OF SEVERE ACUTE RESPIRATORY SYNDROME CORONAVIRUS 2 (SARS-COV-2) DISEASE: Primary | ICD-10-CM

## 2021-10-09 PROCEDURE — 96361 HYDRATE IV INFUSION ADD-ON: CPT

## 2021-10-09 PROCEDURE — M0243 CASIRIVI AND IMDEVI INFUSION: HCPCS | Performed by: PREVENTIVE MEDICINE

## 2021-10-09 PROCEDURE — 96365 THER/PROPH/DIAG IV INF INIT: CPT

## 2021-10-09 PROCEDURE — 25010000002 INJECTION, CASIRIVIMAB AND IMDEVIMAB, 1200 MG: Performed by: PREVENTIVE MEDICINE

## 2021-10-09 RX ORDER — DIPHENHYDRAMINE HYDROCHLORIDE 50 MG/ML
50 INJECTION INTRAMUSCULAR; INTRAVENOUS ONCE AS NEEDED
OUTPATIENT
Start: 2021-10-09

## 2021-10-09 RX ORDER — SODIUM CHLORIDE 9 MG/ML
30 INJECTION, SOLUTION INTRAVENOUS ONCE
Status: CANCELLED | OUTPATIENT
Start: 2021-10-09

## 2021-10-09 RX ORDER — EPINEPHRINE 1 MG/ML
0.3 INJECTION, SOLUTION, CONCENTRATE INTRAVENOUS AS NEEDED
OUTPATIENT
Start: 2021-10-09

## 2021-10-09 RX ORDER — DIPHENHYDRAMINE HCL 25 MG
50 CAPSULE ORAL ONCE AS NEEDED
OUTPATIENT
Start: 2021-10-09

## 2021-10-09 RX ORDER — SODIUM CHLORIDE 9 MG/ML
30 INJECTION, SOLUTION INTRAVENOUS ONCE
Status: COMPLETED | OUTPATIENT
Start: 2021-10-09 | End: 2021-10-09

## 2021-10-09 RX ORDER — METHYLPREDNISOLONE SODIUM SUCCINATE 125 MG/2ML
125 INJECTION, POWDER, LYOPHILIZED, FOR SOLUTION INTRAMUSCULAR; INTRAVENOUS AS NEEDED
OUTPATIENT
Start: 2021-10-09

## 2021-10-09 RX ADMIN — SODIUM CHLORIDE 30 ML: 9 INJECTION, SOLUTION INTRAVENOUS at 10:56

## 2021-10-09 RX ADMIN — CASIRIVIMAB AND IMDEVIMAB: 600; 600 INJECTION, SOLUTION, CONCENTRATE INTRAVENOUS at 10:38

## 2021-12-16 ENCOUNTER — TELEPHONE (OUTPATIENT)
Dept: ONCOLOGY | Facility: CLINIC | Age: 36
End: 2021-12-16

## 2021-12-22 DIAGNOSIS — D75.839 THROMBOCYTOSIS: ICD-10-CM

## 2022-01-06 ENCOUNTER — APPOINTMENT (OUTPATIENT)
Dept: LAB | Facility: HOSPITAL | Age: 37
End: 2022-01-06

## 2022-01-10 ENCOUNTER — TELEPHONE (OUTPATIENT)
Dept: ONCOLOGY | Facility: CLINIC | Age: 37
End: 2022-01-10

## 2022-01-10 NOTE — TELEPHONE ENCOUNTER
Caller: ELFEGO KIM    Relationship to patient: SELF    Best call back number: [phone]    Chief complaint: PT IS CALLING TO R/S HER APPT    Type of visit: LAB AND FOLLOW UP    Requested date: NEXT AVAILABLE    If rescheduling, when is the original appointment: 01/06/22

## 2022-01-25 NOTE — PROGRESS NOTES
Hematology/Oncology Outpatient Follow Up    PATIENT NAME:Chrystal Walters  :1985  MRN: [id number]  PRIMARY CARE PHYSICIAN: Belinda Smith MD  REFERRING PHYSICIAN: Belinda Smith MD    Chief Complaint   Patient presents with   • Follow-up     Thrombocytosis         HISTORY OF PRESENT ILLNESS:     Ms. Walters is a 34-year-old female who presents to our office on 19 for consultation regarding thrombocytosis.  She had a CBC on 10/1/18 that showed a platelet count of 487,000.  She is consulted for thrombocytosis.    • 10/1/18 - WBC 6.3, hemoglobin 12.9, platelet count 487,000, MCV 92.8.  Magnesium 1.8.  B12 211.  TSH 1.37.  ESR 48 (H).  Vitamin D 22(L).  Creatinine 0.7, BUN 11.  LFTs are normal.    • 18 - WBC 5.2, hemoglobin 13.1, platelet count 471,000, MCV 94.4.      2019 - The patient presents for initial consultation.  She denies any history of strokes.  She denies any fevers, chills, night sweats, weight loss or lymph node swelling.  She denies any history of thrombosis.  She does report slight excess bruising.  She denies any personal or family history of lupus or any connective tissue disorder.  She denies any recent infection.  Patient denies any bleeding per rectum.  She does not have any menstrual cycles since her hysterectomy.  • 19 - WBC 8.7, hemoglobin 12.6, platelet count 488,000, MCV 93.1.    • 19 - B12 259.  CRP 0.41.  Ferritin 108.  Folate 20.6.  Iron saturation 14% (L), TIBC 338, serum iron 49.  ESR 41 (H).  Antiparietal cell antibodies negative.  Intrinsic factor antibodies negative.  Peripheral blood smear:  Thrombocytosis.  No immature cells noted.  • 19 - WBC 9.3, hemoglobin 12.8, platelet count 462,000, MCV 94.5.    • 19 - OnkoSit myeloproliferative neoplasm panel by NGS:  Normal.  No mutations detected.    • 19 - WBC 8.3, hemoglobin 13.6, platelet count 453,000, MCV 92.8.  Neutrophils 6590.  Creatinine 0.7, BUN 12.  LFTs are normal.     • 19 - Chest x-ray:  Negative.   • 2020 Obi 2 was negative  • 2020 patient had a ferritin level which was 195, TIBC was normal at 374, iron saturation was low at 13%, white count was 9.7, hemoglobin 12.9 and platelets were 508 differentials were 58% neutrophils, 32% lymphocytes, there was no monocytosis, eosinophilia or basophilia.  Sed rate was elevated to 48  • 2020-patient had a uric acid level which was normal, LDH was normal, soluble transferrin receptor assay was normal and BCR ABL was negative.  • Since her last visit patient has suffered retinal detachment and is scheduled for surgery 5/15/2020  • Ultrasound of the abdomen was recommended to evaluate for possible spleen enlargement.  Patient has not had this completed.  She is not ready to have additional work-up.    Past Medical History:   Diagnosis Date   • B12 deficiency    • Low back pain    • Obesity    • Sleep apnea    • Thrombocytosis    • Vitamin D deficiency        Past Surgical History:   Procedure Laterality Date   •  SECTION     • CYSTOSCOPY     • EYE SURGERY     • GLAUCOMA SURGERY Right     2020   • HYSTERECTOMY     • RETINAL DETACHMENT SURGERY  2020         Current Outpatient Medications:   •  acetaminophen (TYLENOL) 500 MG tablet, Take 500 mg by mouth Every 6 (Six) Hours As Needed for Mild Pain ., Disp: , Rfl:   •  Cholecalciferol (VITAMIN D3) 1000 units capsule, Take 4,000 Units by mouth Daily. Take four  gummies daily(1000 each), Disp: , Rfl:   •  ibuprofen (ADVIL,MOTRIN) 800 MG tablet, , Disp: , Rfl:   •  Lidocaine Viscous HCl (XYLOCAINE) 2 % solution, Gargle and spit out after, Disp: 200 mL, Rfl: 0  •  Multiple Vitamins-Minerals (ONE DAILY MULTIVITAMIN WOMEN) tablet, Take 1 tablet by mouth Daily., Disp: , Rfl:     No Known Allergies    Family History   Problem Relation Age of Onset   • Alcohol abuse Father    • Arthritis Father    • Diabetes Father    • Hyperlipidemia Father    • Anxiety disorder  "Mother        Cancer-related family history is not on file.    Social History     Tobacco Use   • Smoking status: Never Smoker   • Smokeless tobacco: Never Used   Vaping Use   • Vaping Use: Never used   Substance Use Topics   • Alcohol use: No   • Drug use: No       I have reviewed and confirmed the accuracy of the patient's history: as entered by the MA/LPN/RN. Andreia Flynn MD 01/27/22       SUBJECTIVE:    Has no specific complaints today.  Patient has visual impairment on the right eye and is scheduled for retinal surgery for retinal detachment on 5/15/2020.      Does not have any new issues today        REVIEW OF SYSTEMS:  Review of Systems   Constitutional: Negative for chills and fever.   HENT: Negative for ear pain, mouth sores, nosebleeds and sore throat.    Eyes: Positive for visual disturbance. Negative for photophobia.        Right eye   Respiratory: Negative for wheezing and stridor.    Cardiovascular: Negative for chest pain and palpitations.   Gastrointestinal: Negative for abdominal pain, diarrhea, nausea and vomiting.   Endocrine: Negative for cold intolerance and heat intolerance.   Genitourinary: Negative for dysuria and hematuria.   Musculoskeletal: Positive for arthralgias. Negative for joint swelling and neck stiffness.   Skin: Negative for color change and rash.   Neurological: Negative for seizures and syncope.   Hematological: Negative for adenopathy.        No obvious bleeding   Psychiatric/Behavioral: Negative for agitation, confusion and hallucinations.       OBJECTIVE:    Vitals:    01/27/22 1433   BP: 106/79   Pulse: 81   Resp: 18   Temp: 97.8 °F (36.6 °C)   SpO2: 99%   Weight: 68.5 kg (151 lb)   Height: 157.5 cm (62\")   PainSc: 0-No pain     Body mass index is 27.62 kg/m².    ECOG  (0) Fully active, able to carry on all predisease performance without restriction    Physical Exam   Constitutional: She is oriented to person, place, and time. She appears well-developed and " well-nourished.   HENT:   Head: Normocephalic.   Right Ear: External ear normal.   Left Ear: External ear normal.   Nose: Nose normal.   Eyes:   Right eye covered   Neck: Normal range of motion.   Pulmonary/Chest: Effort normal.   Musculoskeletal: Normal range of motion.   Neurological: She is alert and oriented to person, place, and time.   Psychiatric: She has a normal mood and affect. Her behavior is normal.     I have reexamined the patient and the results are consistent with the previously documented exam. Andreia Flynn MD     RECENT LABS  WBC   Date Value Ref Range Status   01/27/2022 6.56 3.40 - 10.80 10*3/mm3 Final     RBC   Date Value Ref Range Status   01/27/2022 3.99 3.77 - 5.28 10*6/mm3 Final     Hemoglobin   Date Value Ref Range Status   01/27/2022 13.0 12.0 - 15.9 g/dL Final     Hematocrit   Date Value Ref Range Status   01/27/2022 37.6 34.0 - 46.6 % Final     MCV   Date Value Ref Range Status   01/27/2022 94.2 79.0 - 97.0 fL Final     MCH   Date Value Ref Range Status   01/27/2022 32.6 26.6 - 33.0 pg Final     MCHC   Date Value Ref Range Status   01/27/2022 34.6 31.5 - 35.7 g/dL Final     RDW   Date Value Ref Range Status   01/27/2022 11.5 (L) 12.3 - 15.4 % Final     RDW-SD   Date Value Ref Range Status   01/27/2022 38.6 37.0 - 54.0 fl Final     MPV   Date Value Ref Range Status   01/27/2022 7.9 6.0 - 12.0 fL Final     Platelets   Date Value Ref Range Status   01/27/2022 420 140 - 450 10*3/mm3 Final     Neutrophil %   Date Value Ref Range Status   01/27/2022 54.9 42.7 - 76.0 % Final     Lymphocyte %   Date Value Ref Range Status   01/27/2022 34.3 19.6 - 45.3 % Final     Monocyte %   Date Value Ref Range Status   01/27/2022 10.1 5.0 - 12.0 % Final     Eosinophil %   Date Value Ref Range Status   01/27/2022 0.5 0.3 - 6.2 % Final     Basophil %   Date Value Ref Range Status   01/27/2022 0.2 0.0 - 1.5 % Final     Immature Grans %   Date Value Ref Range Status   09/03/2021 0.2 0.0 - 0.5 % Final      Neutrophils, Absolute   Date Value Ref Range Status   01/27/2022 3.61 1.70 - 7.00 10*3/mm3 Final     Lymphocytes, Absolute   Date Value Ref Range Status   01/27/2022 2.25 0.70 - 3.10 10*3/mm3 Final     Monocytes, Absolute   Date Value Ref Range Status   01/27/2022 0.66 0.10 - 0.90 10*3/mm3 Final     Eosinophils, Absolute   Date Value Ref Range Status   01/27/2022 0.03 0.00 - 0.40 10*3/mm3 Final     Basophils, Absolute   Date Value Ref Range Status   01/27/2022 0.01 0.00 - 0.20 10*3/mm3 Final     Immature Grans, Absolute   Date Value Ref Range Status   09/03/2021 0.01 0.00 - 0.05 10*3/mm3 Final     nRBC   Date Value Ref Range Status   09/03/2021 0.2 0.0 - 0.2 /100 WBC Final       Lab Results   Component Value Date    GLUCOSE 84 09/03/2021    BUN 13 09/03/2021    CREATININE 0.65 09/03/2021    EGFRIFNONA 104 09/03/2021    BCR 20.0 09/03/2021    K 4.3 09/03/2021    CO2 26.0 09/03/2021    CALCIUM 9.5 09/03/2021    ALBUMIN 4.60 09/03/2021    LABIL2 1.3 05/29/2019    AST 15 09/03/2021    ALT 17 09/03/2021         Assessment/Plan     Thrombocytosis  - CBC & Differential    Iron deficiency  - CBC & Differential      ASSESSMENT:    1. Thrombocytosis:  This is felt to be reactive thrombocytosis.  She has mild underlying inflammation and also has mild iron deficiency.  MPN panel by [facility] was negative.  Peripheral blood smear failed to show any malignant cells.  Platelets continue to remain high.  BCR ABL, uric acid, LDH were normal .Ultrasound of the abdomen to evaluate for splenomegaly was completed in- December 2021 and was normal.    2. Low-normal vitamin B12:  Intrinsic factor and parietal cell antibodies are negative.  She has been on B12 supplements.  3. Mild iron deficiency:  Patient had a hysterectomy and therefore, does not have any menorrhagia.  She denies any rectal bleeding.  She has been on oral iron supplementation but I have asked patient to discontinue.  Her latest ferritin was 355 on 9/3/2021.  She has  had a normal hemoglobin  4. Anxiety.  5. Possible lactose intolerance with GI problems.      PLAN:     1. CBC today is completely normal, thrombocytosis has resolved  2. Continue baby aspirin.    3. She will remain off iron supplementation  4. Reviewed ultrasound of the abdomen December 2021  5. Follow-up 6 months         I have reviewed labs results, imaging, vitals, and medications with the patient today. Will follow up in 3 months with me.

## 2022-01-27 ENCOUNTER — OFFICE VISIT (OUTPATIENT)
Dept: ONCOLOGY | Facility: CLINIC | Age: 37
End: 2022-01-27

## 2022-01-27 ENCOUNTER — LAB (OUTPATIENT)
Dept: LAB | Facility: HOSPITAL | Age: 37
End: 2022-01-27

## 2022-01-27 VITALS
RESPIRATION RATE: 18 BRPM | DIASTOLIC BLOOD PRESSURE: 79 MMHG | TEMPERATURE: 97.8 F | HEIGHT: 62 IN | WEIGHT: 151 LBS | BODY MASS INDEX: 27.79 KG/M2 | SYSTOLIC BLOOD PRESSURE: 106 MMHG | HEART RATE: 81 BPM | OXYGEN SATURATION: 99 %

## 2022-01-27 DIAGNOSIS — D75.839 THROMBOCYTOSIS: Primary | ICD-10-CM

## 2022-01-27 DIAGNOSIS — E53.8 B12 DEFICIENCY: ICD-10-CM

## 2022-01-27 DIAGNOSIS — E61.1 IRON DEFICIENCY: ICD-10-CM

## 2022-01-27 LAB
BASOPHILS # BLD AUTO: 0.01 10*3/MM3 (ref 0–0.2)
BASOPHILS NFR BLD AUTO: 0.2 % (ref 0–1.5)
DEPRECATED RDW RBC AUTO: 38.6 FL (ref 37–54)
EOSINOPHIL # BLD AUTO: 0.03 10*3/MM3 (ref 0–0.4)
EOSINOPHIL NFR BLD AUTO: 0.5 % (ref 0.3–6.2)
ERYTHROCYTE [DISTWIDTH] IN BLOOD BY AUTOMATED COUNT: 11.5 % (ref 12.3–15.4)
HCT VFR BLD AUTO: 37.6 % (ref 34–46.6)
HGB BLD-MCNC: 13 G/DL (ref 12–15.9)
HOLD SPECIMEN: NORMAL
HOLD SPECIMEN: NORMAL
LYMPHOCYTES # BLD AUTO: 2.25 10*3/MM3 (ref 0.7–3.1)
LYMPHOCYTES NFR BLD AUTO: 34.3 % (ref 19.6–45.3)
MCH RBC QN AUTO: 32.6 PG (ref 26.6–33)
MCHC RBC AUTO-ENTMCNC: 34.6 G/DL (ref 31.5–35.7)
MCV RBC AUTO: 94.2 FL (ref 79–97)
MONOCYTES # BLD AUTO: 0.66 10*3/MM3 (ref 0.1–0.9)
MONOCYTES NFR BLD AUTO: 10.1 % (ref 5–12)
NEUTROPHILS NFR BLD AUTO: 3.61 10*3/MM3 (ref 1.7–7)
NEUTROPHILS NFR BLD AUTO: 54.9 % (ref 42.7–76)
PLATELET # BLD AUTO: 420 10*3/MM3 (ref 140–450)
PMV BLD AUTO: 7.9 FL (ref 6–12)
RBC # BLD AUTO: 3.99 10*6/MM3 (ref 3.77–5.28)
WBC NRBC COR # BLD: 6.56 10*3/MM3 (ref 3.4–10.8)

## 2022-01-27 PROCEDURE — 99213 OFFICE O/P EST LOW 20 MIN: CPT | Performed by: INTERNAL MEDICINE

## 2022-01-27 PROCEDURE — 36415 COLL VENOUS BLD VENIPUNCTURE: CPT

## 2022-01-27 PROCEDURE — 85025 COMPLETE CBC W/AUTO DIFF WBC: CPT

## 2022-03-22 ENCOUNTER — OFFICE VISIT (OUTPATIENT)
Dept: FAMILY MEDICINE CLINIC | Facility: CLINIC | Age: 37
End: 2022-03-22

## 2022-03-22 VITALS
WEIGHT: 152 LBS | SYSTOLIC BLOOD PRESSURE: 118 MMHG | DIASTOLIC BLOOD PRESSURE: 80 MMHG | BODY MASS INDEX: 27.97 KG/M2 | OXYGEN SATURATION: 97 % | TEMPERATURE: 99.1 F | HEIGHT: 62 IN | HEART RATE: 70 BPM

## 2022-03-22 DIAGNOSIS — J01.90 ACUTE SINUSITIS, RECURRENCE NOT SPECIFIED, UNSPECIFIED LOCATION: ICD-10-CM

## 2022-03-22 DIAGNOSIS — R05.9 COUGH: Primary | ICD-10-CM

## 2022-03-22 PROCEDURE — 99213 OFFICE O/P EST LOW 20 MIN: CPT | Performed by: PREVENTIVE MEDICINE

## 2022-03-22 RX ORDER — AMOXICILLIN 875 MG/1
875 TABLET, COATED ORAL 2 TIMES DAILY
Qty: 20 TABLET | Refills: 0 | Status: SHIPPED | OUTPATIENT
Start: 2022-03-22 | End: 2022-04-18

## 2022-03-22 RX ORDER — UBIDECARENONE 75 MG
50 CAPSULE ORAL DAILY
COMMUNITY

## 2022-03-22 RX ORDER — MULTIVIT WITH MINERALS/LUTEIN
250 TABLET ORAL DAILY
COMMUNITY

## 2022-03-22 NOTE — PROGRESS NOTES
"Subjective   Chrystal Walters is a 36 y.o. female presents for   Chief Complaint   Patient presents with   • Cough     And congestion 9 days.      Patient was exposed to lots of sick children at [employer] class IX days ago she developed green nasal discharge coughing up green sputum no fever 9 days ago.  She has not had any vomiting nausea or diarrhea dysuria or body aches.  She has had Covid twice and did not take a flu shot but has no body aches.  No sore throat.  Patient was presumed to have sinusitis and upper respiratory infection that is possibly bacterial by the stage of the game so she was placed on amoxicillin 875 twice daily told to rest and drink plenty of fluids and let us know how she is doing at the end of the week not improved we will get a chest x-ray and sinus x-rays.  Health Maintenance Due   Topic Date Due   • COVID-19 Vaccine (1) Never done   • TDAP/TD VACCINES (1 - Tdap) Never done   • ANNUAL PHYSICAL  03/10/2022       History of Present Illness     Vitals:    03/22/22 1539   BP: 118/80   BP Location: Left arm   Patient Position: Sitting   Cuff Size: Adult   Pulse: 70   Temp: 99.1 °F (37.3 °C)   TempSrc: Temporal   SpO2: 97%   Weight: 68.9 kg (152 lb)   Height: 157.5 cm (62\")     Body mass index is 27.8 kg/m².    Current Outpatient Medications on File Prior to Visit   Medication Sig Dispense Refill   • acetaminophen (TYLENOL) 500 MG tablet Take 500 mg by mouth Every 6 (Six) Hours As Needed for Mild Pain .     • Cholecalciferol (VITAMIN D3) 1000 units capsule Take 4,000 Units by mouth Daily. Take four  gummies daily(1000 each)     • ibuprofen (ADVIL,MOTRIN) 800 MG tablet      • vitamin B-12 (CYANOCOBALAMIN) 100 MCG tablet Take 50 mcg by mouth Daily.     • vitamin C (ASCORBIC ACID) 250 MG tablet Take 250 mg by mouth Daily.     • [DISCONTINUED] Lidocaine Viscous HCl (XYLOCAINE) 2 % solution Gargle and spit out after 200 mL 0   • [DISCONTINUED] Multiple Vitamins-Minerals (ONE DAILY MULTIVITAMIN " WOMEN) tablet Take 1 tablet by mouth Daily.       No current facility-administered medications on file prior to visit.       The following portions of the patient's history were reviewed and updated as appropriate: allergies, current medications, past family history, past medical history, past social history, past surgical history and problem list.    Review of Systems   HENT: Positive for congestion and sinus pressure.    Respiratory: Positive for cough.        Objective   Physical Exam  Vitals reviewed.   Constitutional:       General: She is not in acute distress.     Appearance: Normal appearance. She is well-developed. She is not ill-appearing or toxic-appearing.   HENT:      Head: Normocephalic and atraumatic.      Right Ear: Tympanic membrane, ear canal and external ear normal.      Left Ear: Tympanic membrane, ear canal and external ear normal.      Nose: Nose normal.      Mouth/Throat:      Mouth: Mucous membranes are moist.      Pharynx: No oropharyngeal exudate or posterior oropharyngeal erythema.   Eyes:      Extraocular Movements: Extraocular movements intact.      Conjunctiva/sclera: Conjunctivae normal.      Pupils: Pupils are equal, round, and reactive to light.   Cardiovascular:      Rate and Rhythm: Normal rate and regular rhythm.      Heart sounds: Normal heart sounds.   Pulmonary:      Effort: Pulmonary effort is normal.      Breath sounds: Normal breath sounds.   Abdominal:      General: Bowel sounds are normal. There is no distension.      Palpations: Abdomen is soft. There is no mass.      Tenderness: There is no abdominal tenderness. There is no right CVA tenderness or left CVA tenderness.   Musculoskeletal:         General: Normal range of motion.      Cervical back: Neck supple.   Skin:     General: Skin is warm.   Neurological:      General: No focal deficit present.      Mental Status: She is alert and oriented to person, place, and time.   Psychiatric:         Mood and Affect: Mood  normal.         Behavior: Behavior normal.       PHQ-9 Total Score:      Assessment/Plan   Diagnoses and all orders for this visit:    1. Cough (Primary)  Comments:  9 days of cough producing green sputum.  No fever has had Covid twice only irritated throat.  Amoxicillin 875 twice daily for 10 days call toward the end of the    2. Acute sinusitis, recurrence not specified, unspecified location  Comments:  Tightness and pain across her frontal sinus area and maxillary sinus area no fever or chills body aches diarrhea nausea or vomiting    Other orders  -     amoxicillin (AMOXIL) 875 MG tablet; Take 1 tablet by mouth 2 (Two) Times a Day.  Dispense: 20 tablet; Refill: 0        Patient Instructions   Rest and fluids-take antibiotic till gone and MY Chart message us end of the week to report progress.

## 2022-03-22 NOTE — PATIENT INSTRUCTIONS
Rest and fluids-take antibiotic till gone and MY Chart message us end of the week to report progress.

## 2022-04-18 ENCOUNTER — OFFICE VISIT (OUTPATIENT)
Dept: FAMILY MEDICINE CLINIC | Facility: CLINIC | Age: 37
End: 2022-04-18

## 2022-04-18 VITALS
TEMPERATURE: 98 F | BODY MASS INDEX: 28.05 KG/M2 | HEIGHT: 62 IN | DIASTOLIC BLOOD PRESSURE: 71 MMHG | HEART RATE: 79 BPM | OXYGEN SATURATION: 99 % | WEIGHT: 152.4 LBS | SYSTOLIC BLOOD PRESSURE: 106 MMHG

## 2022-04-18 DIAGNOSIS — Z00.01 ENCOUNTER FOR ANNUAL GENERAL MEDICAL EXAMINATION WITH ABNORMAL FINDINGS IN ADULT: Primary | ICD-10-CM

## 2022-04-18 DIAGNOSIS — H40.9 GLAUCOMA OF RIGHT EYE, UNSPECIFIED GLAUCOMA TYPE: ICD-10-CM

## 2022-04-18 DIAGNOSIS — D22.9 ATYPICAL MOLE: ICD-10-CM

## 2022-04-18 PROCEDURE — 99395 PREV VISIT EST AGE 18-39: CPT | Performed by: PREVENTIVE MEDICINE

## 2022-04-18 PROCEDURE — 99213 OFFICE O/P EST LOW 20 MIN: CPT | Performed by: PREVENTIVE MEDICINE

## 2022-04-18 NOTE — PROGRESS NOTES
"Subjective   Chrystal Walters is a 36 y.o. female presents for   Chief Complaint   Patient presents with   • Abrasion     Mole- right arm - darkened recently   • Annual Exam     Patient presents today for annual wellness exam and has been advised to wear sunscreen and seatbelt she does have an atypical mole on the right upper outer arm which seems to be darker in color and this is quite markedly different than the rest of her freckled arm and we will refer to dermatology she does have a new mole on the right inner aspect of her lower leg and also follow-up with dermatology finally she does have glaucoma and needs to be referral to her glaucoma specialist.  Health Maintenance Due   Topic Date Due   • COVID-19 Vaccine (1) Never done   • TDAP/TD VACCINES (1 - Tdap) Never done   • ANNUAL PHYSICAL  03/10/2022       History of Present Illness     Vitals:    04/18/22 1220 04/18/22 1222   BP: 107/72 106/71   BP Location: Left arm Right arm   Patient Position: Sitting Sitting   Cuff Size: Adult Adult   Pulse: 79    Temp: 98 °F (36.7 °C)    TempSrc: Temporal    SpO2: 99%    Weight: 69.1 kg (152 lb 6.4 oz)    Height: 157.5 cm (62\")      Body mass index is 27.87 kg/m².    Current Outpatient Medications on File Prior to Visit   Medication Sig Dispense Refill   • acetaminophen (TYLENOL) 500 MG tablet Take 500 mg by mouth Every 6 (Six) Hours As Needed for Mild Pain .     • Cholecalciferol (VITAMIN D3) 1000 units capsule Take 4,000 Units by mouth Daily. Take four  gummies daily(1000 each)     • ibuprofen (ADVIL,MOTRIN) 800 MG tablet      • vitamin B-12 (CYANOCOBALAMIN) 100 MCG tablet Take 50 mcg by mouth Daily.     • vitamin C (ASCORBIC ACID) 250 MG tablet Take 250 mg by mouth Daily.     • [DISCONTINUED] amoxicillin (AMOXIL) 875 MG tablet Take 1 tablet by mouth 2 (Two) Times a Day. 20 tablet 0     No current facility-administered medications on file prior to visit.       The following portions of the patient's history were reviewed " and updated as appropriate: allergies, current medications, past family history, past medical history, past social history, past surgical history and problem list.    Review of Systems   Skin: Positive for skin lesions.       Objective   Physical Exam  Vitals reviewed.   Constitutional:       General: She is not in acute distress.     Appearance: Normal appearance. She is well-developed. She is not ill-appearing or toxic-appearing.   HENT:      Head: Normocephalic and atraumatic.      Nose: Nose normal.   Eyes:      Extraocular Movements: Extraocular movements intact.      Conjunctiva/sclera: Conjunctivae normal.      Pupils: Pupils are equal, round, and reactive to light.   Cardiovascular:      Rate and Rhythm: Normal rate.   Abdominal:      General: There is no distension.      Palpations: There is no mass.      Tenderness: There is no abdominal tenderness.   Musculoskeletal:      Cervical back: Neck supple.   Skin:     General: Skin is warm.      Findings: Lesion present.   Neurological:      General: No focal deficit present.      Mental Status: She is alert and oriented to person, place, and time.   Psychiatric:         Mood and Affect: Mood normal.         Behavior: Behavior normal.       PHQ-9 Total Score:      Assessment/Plan   Diagnoses and all orders for this visit:    1. Encounter for annual general medical examination with abnormal findings in adult (Primary)  Comments:  Patient has been advised to wear sunscreen and seatbelt    2. Glaucoma of right eye, unspecified glaucoma type  Comments:  Referral placed to glaucoma specialist  Orders:  -     Ambulatory Referral for Diabetic Eye Exam-Ophthalmology    3. Atypical mole  Comments:  2 mm moles that look different than her usual freckled arm on the right upper outer arm.  Follow-up Derm also raised 1 to 2 mm mole right inner lower leg    Orders:  -     Ambulatory Referral to Dermatology        There are no Patient Instructions on file for this visit.

## 2022-08-01 ENCOUNTER — APPOINTMENT (OUTPATIENT)
Dept: LAB | Facility: HOSPITAL | Age: 37
End: 2022-08-01

## 2022-11-03 ENCOUNTER — TELEPHONE (OUTPATIENT)
Dept: ONCOLOGY | Facility: CLINIC | Age: 37
End: 2022-11-03

## 2022-11-03 NOTE — TELEPHONE ENCOUNTER
Caller: Chrystal Walters    Relationship to patient: Self    Best call back number: [phone]    Chief complaint: PATIENT NEEDS TO RESCHEDULE AN APPOINTMENT SHE HAD TO CANCEL    Type of visit: LAB AND FOLLOW UP    Requested date: THURSDAYS ARE GOOD FOR PATIENT      If rescheduling, when is the original appointment: 8-1-22     Additional notes:PLEASE CALL PATIENT TO ADVISE

## 2022-11-09 ENCOUNTER — HOSPITAL ENCOUNTER (OUTPATIENT)
Facility: HOSPITAL | Age: 37
Discharge: HOME | End: 2022-11-09
Attending: EMERGENCY MEDICINE | Admitting: EMERGENCY MEDICINE

## 2022-11-09 VITALS
TEMPERATURE: 98.2 F | SYSTOLIC BLOOD PRESSURE: 128 MMHG | WEIGHT: 145 LBS | DIASTOLIC BLOOD PRESSURE: 75 MMHG | RESPIRATION RATE: 16 BRPM | OXYGEN SATURATION: 99 % | BODY MASS INDEX: 26.68 KG/M2 | HEART RATE: 66 BPM | HEIGHT: 62 IN

## 2022-11-09 DIAGNOSIS — M62.830 PARASPINAL MUSCLE SPASM: Primary | ICD-10-CM

## 2022-11-09 LAB
B-HCG UR QL: NEGATIVE
BILIRUB UR QL STRIP: NEGATIVE
CLARITY UR: CLEAR
COLOR UR: YELLOW
GLUCOSE UR STRIP-MCNC: NEGATIVE MG/DL
HGB UR QL STRIP.AUTO: NEGATIVE
KETONES UR QL STRIP: NEGATIVE
LEUKOCYTE ESTERASE UR QL STRIP.AUTO: NEGATIVE
NITRITE UR QL STRIP: NEGATIVE
PH UR STRIP.AUTO: 7 [PH] (ref 5–8)
PROT UR QL STRIP: NEGATIVE
SP GR UR STRIP: 1.01 (ref 1–1.03)
UROBILINOGEN UR QL STRIP: NORMAL

## 2022-11-09 PROCEDURE — 81003 URINALYSIS AUTO W/O SCOPE: CPT

## 2022-11-09 PROCEDURE — G0463 HOSPITAL OUTPT CLINIC VISIT: HCPCS | Performed by: NURSE PRACTITIONER

## 2022-11-09 PROCEDURE — 99213 OFFICE O/P EST LOW 20 MIN: CPT | Performed by: NURSE PRACTITIONER

## 2022-11-09 PROCEDURE — EDLOS: Performed by: NURSE PRACTITIONER

## 2022-11-09 PROCEDURE — 81025 URINE PREGNANCY TEST: CPT

## 2022-11-09 RX ORDER — BACLOFEN 10 MG/1
10 TABLET ORAL 3 TIMES DAILY
Qty: 15 TABLET | Refills: 0 | Status: SHIPPED | OUTPATIENT
Start: 2022-11-09

## 2022-11-09 NOTE — DISCHARGE INSTRUCTIONS
Thank you for letting us care for you today.  Your urine did not reveal any evidence of urinary tract infection you do have significant muscular tenderness and will be treated with muscle relaxer, heat, gentle stretches, and any over-the-counter topicals for pain such as IcyHot.  If her symptoms have not improved in the next 2 to 3 days please follow-up with your primary care provider.

## 2022-11-09 NOTE — FSED PROVIDER NOTE
Subjective   History of Present Illness  Patient is a 37-year-old female who is alert, oriented, in no acute distress.  She reports sudden onset of low back pain that began on left side and is now bilateral in nature.  She reports the pain is worse with movement and palpation, better at rest.  She denies fever, chills, weakness.  She does report some urinary urgency but states this is not uncommon for her as she drinks adequate amounts of water.  She denies nausea, vomiting, flank pain, suprapubic pain.  She denies trauma, unusual pushing or pulling, or any injury to her back.    History provided by:  Patient   used: No        Review of Systems   Constitutional: Negative for activity change, appetite change, chills, diaphoresis, fatigue, fever and unexpected weight change.   HENT: Negative for congestion, facial swelling, postnasal drip, rhinorrhea, sinus pressure, sinus pain, sneezing and sore throat.    Eyes: Negative for itching and visual disturbance.   Respiratory: Negative for cough, chest tightness and shortness of breath.    Cardiovascular: Negative for chest pain, palpitations and leg swelling.   Gastrointestinal: Negative for diarrhea, nausea and vomiting.   Genitourinary: Positive for urgency. Negative for decreased urine volume, dysuria, flank pain and frequency.   Musculoskeletal: Positive for back pain and myalgias. Negative for arthralgias, gait problem and joint swelling.   Skin: Negative for pallor and rash.   Allergic/Immunologic: Negative.    Neurological: Negative for dizziness, weakness, light-headedness and headaches.   Hematological: Negative.    Psychiatric/Behavioral: Negative.        Past Medical History:   Diagnosis Date   • B12 deficiency    • Low back pain    • Obesity    • Sleep apnea    • Thrombocytosis    • Vitamin D deficiency        No Known Allergies    Past Surgical History:   Procedure Laterality Date   •  SECTION     • CYSTOSCOPY     • EYE SURGERY      • GLAUCOMA SURGERY Right     06/2020   • HYSTERECTOMY     • RETINAL DETACHMENT SURGERY  05/2020       Family History   Problem Relation Age of Onset   • Alcohol abuse Father    • Arthritis Father    • Diabetes Father    • Hyperlipidemia Father    • Anxiety disorder Mother        Social History     Socioeconomic History   • Marital status:    Tobacco Use   • Smoking status: Never   • Smokeless tobacco: Never   Vaping Use   • Vaping Use: Never used   Substance and Sexual Activity   • Alcohol use: No   • Drug use: No   • Sexual activity: Yes     Partners: Male     Birth control/protection: None           Objective   Physical Exam  Vitals and nursing note reviewed.   Constitutional:       General: She is not in acute distress.     Appearance: Normal appearance. She is normal weight. She is not ill-appearing, toxic-appearing or diaphoretic.   HENT:      Head: Normocephalic and atraumatic.      Right Ear: External ear normal.      Left Ear: External ear normal.      Nose: Nose normal.      Mouth/Throat:      Mouth: Mucous membranes are moist.      Pharynx: Oropharynx is clear.   Eyes:      Extraocular Movements: Extraocular movements intact.      Conjunctiva/sclera: Conjunctivae normal.   Cardiovascular:      Rate and Rhythm: Normal rate and regular rhythm.      Pulses: Normal pulses.      Heart sounds: Normal heart sounds.   Pulmonary:      Effort: Pulmonary effort is normal.   Abdominal:      General: Bowel sounds are normal. There is no distension.      Tenderness: There is no abdominal tenderness. There is no right CVA tenderness or left CVA tenderness.   Musculoskeletal:      Cervical back: Normal and normal range of motion. No rigidity.      Thoracic back: Normal.      Lumbar back: Spasms and tenderness present. No bony tenderness. Normal range of motion. Negative right straight leg raise test and negative left straight leg raise test.        Back:    Lymphadenopathy:      Cervical: No cervical adenopathy.    Neurological:      Mental Status: She is alert.         Procedures           ED Course  ED Course as of 11/09/22 1445   Wed Nov 09, 2022   1441 Negative UA and pregnancy reviewed with patient [VT]      ED Course User Index  [VT] Katie Almazan APRN                                           [facility]    Final diagnoses:   Paraspinal muscle spasm       ED Disposition  ED Disposition     ED Disposition   Discharge    Condition   Stable    Comment   --             Belinda Smith MD  [address] [facility] [place] [address] [phone]    Call in 2 days  As needed, If symptoms worsen         Medication List      New Prescriptions    baclofen 10 MG tablet  Commonly known as: LIORESAL  Take 1 tablet by mouth 3 (Three) Times a Day.           Where to Get Your Medications      These medications were sent to [id number] - [place], [place] - [address] AT [facility] [address] [facility] - [phone]  - [phone] FX  [address] [place] [address]    Phone: [phone]   · baclofen 10 MG tablet

## 2022-12-01 ENCOUNTER — LAB (OUTPATIENT)
Dept: LAB | Facility: HOSPITAL | Age: 37
End: 2022-12-01

## 2022-12-01 ENCOUNTER — OFFICE VISIT (OUTPATIENT)
Dept: ONCOLOGY | Facility: CLINIC | Age: 37
End: 2022-12-01

## 2022-12-01 VITALS
TEMPERATURE: 98.1 F | BODY MASS INDEX: 27.05 KG/M2 | HEIGHT: 62 IN | OXYGEN SATURATION: 100 % | WEIGHT: 147 LBS | DIASTOLIC BLOOD PRESSURE: 74 MMHG | HEART RATE: 73 BPM | SYSTOLIC BLOOD PRESSURE: 112 MMHG

## 2022-12-01 DIAGNOSIS — E61.1 IRON DEFICIENCY: ICD-10-CM

## 2022-12-01 DIAGNOSIS — D75.839 THROMBOCYTOSIS: Primary | ICD-10-CM

## 2022-12-01 LAB
BASOPHILS # BLD AUTO: 0.02 10*3/MM3 (ref 0–0.2)
BASOPHILS NFR BLD AUTO: 0.3 % (ref 0–1.5)
DEPRECATED RDW RBC AUTO: 40.5 FL (ref 37–54)
EOSINOPHIL # BLD AUTO: 0.03 10*3/MM3 (ref 0–0.4)
EOSINOPHIL NFR BLD AUTO: 0.5 % (ref 0.3–6.2)
ERYTHROCYTE [DISTWIDTH] IN BLOOD BY AUTOMATED COUNT: 11.9 % (ref 12.3–15.4)
HCT VFR BLD AUTO: 36.8 % (ref 34–46.6)
HGB BLD-MCNC: 12.9 G/DL (ref 12–15.9)
HOLD SPECIMEN: NORMAL
HOLD SPECIMEN: NORMAL
LYMPHOCYTES # BLD AUTO: 1.85 10*3/MM3 (ref 0.7–3.1)
LYMPHOCYTES NFR BLD AUTO: 32.2 % (ref 19.6–45.3)
MCH RBC QN AUTO: 33.6 PG (ref 26.6–33)
MCHC RBC AUTO-ENTMCNC: 35.1 G/DL (ref 31.5–35.7)
MCV RBC AUTO: 95.8 FL (ref 79–97)
MONOCYTES # BLD AUTO: 0.6 10*3/MM3 (ref 0.1–0.9)
MONOCYTES NFR BLD AUTO: 10.5 % (ref 5–12)
NEUTROPHILS NFR BLD AUTO: 3.24 10*3/MM3 (ref 1.7–7)
NEUTROPHILS NFR BLD AUTO: 56.5 % (ref 42.7–76)
PLATELET # BLD AUTO: 437 10*3/MM3 (ref 140–450)
PMV BLD AUTO: 8 FL (ref 6–12)
RBC # BLD AUTO: 3.84 10*6/MM3 (ref 3.77–5.28)
WBC NRBC COR # BLD: 5.74 10*3/MM3 (ref 3.4–10.8)

## 2022-12-01 PROCEDURE — 36415 COLL VENOUS BLD VENIPUNCTURE: CPT

## 2022-12-01 PROCEDURE — 99213 OFFICE O/P EST LOW 20 MIN: CPT | Performed by: INTERNAL MEDICINE

## 2022-12-01 PROCEDURE — 85025 COMPLETE CBC W/AUTO DIFF WBC: CPT

## 2022-12-01 NOTE — PROGRESS NOTES
Hematology/Oncology Outpatient Follow Up    PATIENT NAME:Chrystal Walters  :1985  MRN: [id number]  PRIMARY CARE PHYSICIAN: Belinda Smith MD  REFERRING PHYSICIAN: Belinda Smith MD    Chief Complaint   Patient presents with   • Follow-up     Thrombocytosis       HISTORY OF PRESENT ILLNESS:     Ms. Walters is a 34-year-old female who presents to our office on 19 for consultation regarding thrombocytosis.  She had a CBC on 10/1/18 that showed a platelet count of 487,000.  She is consulted for thrombocytosis.    • 10/1/18 - WBC 6.3, hemoglobin 12.9, platelet count 487,000, MCV 92.8.  Magnesium 1.8.  B12 211.  TSH 1.37.  ESR 48 (H).  Vitamin D 22(L).  Creatinine 0.7, BUN 11.  LFTs are normal.    • 18 - WBC 5.2, hemoglobin 13.1, platelet count 471,000, MCV 94.4.      2019 - The patient presents for initial consultation.  She denies any history of strokes.  She denies any fevers, chills, night sweats, weight loss or lymph node swelling.  She denies any history of thrombosis.  She does report slight excess bruising.  She denies any personal or family history of lupus or any connective tissue disorder.  She denies any recent infection.  Patient denies any bleeding per rectum.  She does not have any menstrual cycles since her hysterectomy.  • 19 - WBC 8.7, hemoglobin 12.6, platelet count 488,000, MCV 93.1.    • 19 - B12 259.  CRP 0.41.  Ferritin 108.  Folate 20.6.  Iron saturation 14% (L), TIBC 338, serum iron 49.  ESR 41 (H).  Antiparietal cell antibodies negative.  Intrinsic factor antibodies negative.  Peripheral blood smear:  Thrombocytosis.  No immature cells noted.  • 19 - WBC 9.3, hemoglobin 12.8, platelet count 462,000, MCV 94.5.    • 19 - [facility] myeloproliferative neoplasm panel by NGS:  Normal.  No mutations detected.    • 19 - WBC 8.3, hemoglobin 13.6, platelet count 453,000, MCV 92.8.  Neutrophils 6590.  Creatinine 0.7, BUN 12.  LFTs are normal.     • 19 - Chest x-ray:  Negative.   • 2020 Obi 2 was negative  • 2020 patient had a ferritin level which was 195, TIBC was normal at 374, iron saturation was low at 13%, white count was 9.7, hemoglobin 12.9 and platelets were 508 differentials were 58% neutrophils, 32% lymphocytes, there was no monocytosis, eosinophilia or basophilia.  Sed rate was elevated to 48  • 2020-patient had a uric acid level which was normal, LDH was normal, soluble transferrin receptor assay was normal and BCR ABL was negative.  • Since her last visit patient has suffered retinal detachment and is scheduled for surgery 5/15/2020  • Ultrasound of the abdomen was recommended to evaluate for possible spleen enlargement.  Patient has not had this completed.  She is not ready to have additional work-up.    Past Medical History:   Diagnosis Date   • B12 deficiency    • Low back pain    • Obesity    • Sleep apnea    • Thrombocytosis    • Vitamin D deficiency        Past Surgical History:   Procedure Laterality Date   •  SECTION     • CYSTOSCOPY     • EYE SURGERY     • GLAUCOMA SURGERY Right     2020   • HYSTERECTOMY     • RETINAL DETACHMENT SURGERY  2020         Current Outpatient Medications:   •  acetaminophen (TYLENOL) 500 MG tablet, Take 500 mg by mouth Every 6 (Six) Hours As Needed for Mild Pain ., Disp: , Rfl:   •  baclofen (LIORESAL) 10 MG tablet, Take 1 tablet by mouth 3 (Three) Times a Day., Disp: 15 tablet, Rfl: 0  •  ibuprofen (ADVIL,MOTRIN) 800 MG tablet, , Disp: , Rfl:   •  Cholecalciferol (VITAMIN D3) 1000 units capsule, Take 4,000 Units by mouth Daily. Take four  gummies daily(1000 each), Disp: , Rfl:   •  vitamin B-12 (CYANOCOBALAMIN) 100 MCG tablet, Take 50 mcg by mouth Daily., Disp: , Rfl:   •  vitamin C (ASCORBIC ACID) 250 MG tablet, Take 250 mg by mouth Daily., Disp: , Rfl:     No Known Allergies    Family History   Problem Relation Age of Onset   • Alcohol abuse Father    • Arthritis Father  "   • Diabetes Father    • Hyperlipidemia Father    • Anxiety disorder Mother        Cancer-related family history is not on file.    Social History     Tobacco Use   • Smoking status: Never   • Smokeless tobacco: Never   Vaping Use   • Vaping Use: Never used   Substance Use Topics   • Alcohol use: No   • Drug use: No       I have reviewed and confirmed the accuracy of the patient's history: Chief complaint, HPI, ROS and Subjective as entered by the MA/LPN/RN. Andreia Narda Flynn MD 12/01/22       SUBJECTIVE:    Has no specific complaints today.  Patient has visual impairment on the right eye and is scheduled for retinal surgery for retinal detachment on 5/15/2020.      Patient denies any new issues today        REVIEW OF SYSTEMS:    Review of Systems   Constitutional: Negative for chills and fever.   HENT: Negative for ear pain, mouth sores, nosebleeds and sore throat.    Eyes: Positive for visual disturbance. Negative for photophobia.        Right eye   Respiratory: Negative for wheezing and stridor.    Cardiovascular: Negative for chest pain and palpitations.   Gastrointestinal: Negative for abdominal pain, diarrhea, nausea and vomiting.   Endocrine: Negative for cold intolerance and heat intolerance.   Genitourinary: Negative for dysuria and hematuria.   Musculoskeletal: Positive for arthralgias. Negative for joint swelling and neck stiffness.   Skin: Negative for color change and rash.   Neurological: Negative for seizures and syncope.   Hematological: Negative for adenopathy.        No obvious bleeding   Psychiatric/Behavioral: Negative for agitation, confusion and hallucinations.       OBJECTIVE:    Vitals:    12/01/22 1331   BP: 112/74   Pulse: 73   Temp: 98.1 °F (36.7 °C)   TempSrc: Oral   SpO2: 100%   Weight: 66.7 kg (147 lb)   Height: 157.5 cm (62\")   PainSc: 0-No pain     Body mass index is 26.89 kg/m².    ECOG  (0) Fully active, able to carry on all predisease performance without " restriction    Physical Exam   Constitutional: She is oriented to person, place, and time. She appears well-developed and well-nourished.   HENT:   Head: Normocephalic.   Right Ear: External ear normal.   Left Ear: External ear normal.   Nose: Nose normal.   Eyes:   Right eye covered   Neck: Normal range of motion.   Pulmonary/Chest: Effort normal.   Musculoskeletal: Normal range of motion.   Neurological: She is alert and oriented to person, place, and time.   Psychiatric: She has a normal mood and affect. Her behavior is normal.     I have reexamined the patient and the results are consistent with the previously documented exam. Andreia Flynn MD     RECENT LABS  WBC   Date Value Ref Range Status   12/01/2022 5.74 3.40 - 10.80 10*3/mm3 Final     RBC   Date Value Ref Range Status   12/01/2022 3.84 3.77 - 5.28 10*6/mm3 Final     Hemoglobin   Date Value Ref Range Status   12/01/2022 12.9 12.0 - 15.9 g/dL Final     Hematocrit   Date Value Ref Range Status   12/01/2022 36.8 34.0 - 46.6 % Final     MCV   Date Value Ref Range Status   12/01/2022 95.8 79.0 - 97.0 fL Final     MCH   Date Value Ref Range Status   12/01/2022 33.6 (H) 26.6 - 33.0 pg Final     MCHC   Date Value Ref Range Status   12/01/2022 35.1 31.5 - 35.7 g/dL Final     RDW   Date Value Ref Range Status   12/01/2022 11.9 (L) 12.3 - 15.4 % Final     RDW-SD   Date Value Ref Range Status   12/01/2022 40.5 37.0 - 54.0 fl Final     MPV   Date Value Ref Range Status   12/01/2022 8.0 6.0 - 12.0 fL Final     Platelets   Date Value Ref Range Status   12/01/2022 437 140 - 450 10*3/mm3 Final     Neutrophil %   Date Value Ref Range Status   12/01/2022 56.5 42.7 - 76.0 % Final     Lymphocyte %   Date Value Ref Range Status   12/01/2022 32.2 19.6 - 45.3 % Final     Monocyte %   Date Value Ref Range Status   12/01/2022 10.5 5.0 - 12.0 % Final     Eosinophil %   Date Value Ref Range Status   12/01/2022 0.5 0.3 - 6.2 % Final     Basophil %   Date Value Ref Range  Status   12/01/2022 0.3 0.0 - 1.5 % Final     Immature Grans %   Date Value Ref Range Status   09/03/2021 0.2 0.0 - 0.5 % Final     Neutrophils, Absolute   Date Value Ref Range Status   12/01/2022 3.24 1.70 - 7.00 10*3/mm3 Final     Lymphocytes, Absolute   Date Value Ref Range Status   12/01/2022 1.85 0.70 - 3.10 10*3/mm3 Final     Monocytes, Absolute   Date Value Ref Range Status   12/01/2022 0.60 0.10 - 0.90 10*3/mm3 Final     Eosinophils, Absolute   Date Value Ref Range Status   12/01/2022 0.03 0.00 - 0.40 10*3/mm3 Final     Basophils, Absolute   Date Value Ref Range Status   12/01/2022 0.02 0.00 - 0.20 10*3/mm3 Final     Immature Grans, Absolute   Date Value Ref Range Status   09/03/2021 0.01 0.00 - 0.05 10*3/mm3 Final     nRBC   Date Value Ref Range Status   09/03/2021 0.2 0.0 - 0.2 /100 WBC Final       Lab Results   Component Value Date    GLUCOSE 84 09/03/2021    BUN 13 09/03/2021    CREATININE 0.65 09/03/2021    EGFRIFNONA 104 09/03/2021    BCR 20.0 09/03/2021    K 4.3 09/03/2021    CO2 26.0 09/03/2021    CALCIUM 9.5 09/03/2021    ALBUMIN 4.60 09/03/2021    LABIL2 1.3 05/29/2019    AST 15 09/03/2021    ALT 17 09/03/2021         Assessment & Plan     There are no diagnoses linked to this encounter.    ASSESSMENT:    1. Thrombocytosis:  This is felt to be reactive thrombocytosis.  She has mild underlying inflammation and also has mild iron deficiency.  [facility] was [facility].  Peripheral blood smear failed to show any malignant cells.  Platelets continue to remain high.  BCR ABL, uric acid, LDH were normal .Ultrasound of the abdomen to evaluate for splenomegaly was completed in-platelets today are normal  2. Low-normal vitamin B12:  Intrinsic factor and parietal cell antibodies are negative.  She has been on B12 supplements.  Continue the same  3. Mild iron deficiency:  Patient had a hysterectomy and therefore, does not have any menorrhagia.  She denies any rectal bleeding.  She has been on oral  iron supplementation but I have asked patient to discontinue.  Her latest ferritin was 355 on 9/3/2021.  She has had a normal hemoglobin  4. Anxiety.  5. Possible lactose intolerance with GI problems.      PLAN:     1. CBC is normal, thrombocytosis has resolved  2. Continue baby aspirin.    3. She will remain off iron supplementation  4. Reviewed ultrasound of the abdomen December 2021  5. Follow-up in 1 year  6. She will follow-up with Dr. Mora her PCP         I have reviewed labs results, imaging, vitals, and medications with the patient today.

## 2023-01-04 NOTE — PROGRESS NOTES
Labs only    Patient here for port flush per protocol. No distress reported. Blood return noted. Heparin locked. Patient discharged to home via w/c with caregiver.

## 2023-09-07 ENCOUNTER — OFFICE VISIT (OUTPATIENT)
Dept: FAMILY MEDICINE CLINIC | Facility: CLINIC | Age: 38
End: 2023-09-07
Payer: OTHER GOVERNMENT

## 2023-09-07 VITALS
HEART RATE: 89 BPM | BODY MASS INDEX: 29.74 KG/M2 | DIASTOLIC BLOOD PRESSURE: 85 MMHG | WEIGHT: 161.6 LBS | SYSTOLIC BLOOD PRESSURE: 127 MMHG | OXYGEN SATURATION: 100 % | TEMPERATURE: 97.9 F | HEIGHT: 62 IN

## 2023-09-07 DIAGNOSIS — M54.12 CERVICAL RADICULOPATHY: ICD-10-CM

## 2023-09-07 DIAGNOSIS — F41.9 ANXIETY: ICD-10-CM

## 2023-09-07 DIAGNOSIS — R20.0 NUMBNESS AND TINGLING OF RIGHT SIDE OF FACE: Primary | ICD-10-CM

## 2023-09-07 DIAGNOSIS — M54.2 NECK PAIN: ICD-10-CM

## 2023-09-07 DIAGNOSIS — R20.2 NUMBNESS AND TINGLING OF RIGHT SIDE OF FACE: Primary | ICD-10-CM

## 2023-09-07 PROCEDURE — 99214 OFFICE O/P EST MOD 30 MIN: CPT | Performed by: NURSE PRACTITIONER

## 2023-09-07 RX ORDER — METHOCARBAMOL 500 MG/1
500 TABLET, FILM COATED ORAL 3 TIMES DAILY
Qty: 15 TABLET | Refills: 0 | Status: SHIPPED | OUTPATIENT
Start: 2023-09-07

## 2023-09-07 NOTE — PROGRESS NOTES
Subjective   Chrystal Walters is a 37 y.o. female presents for   Chief Complaint   Patient presents with    pinched nerve     Been going on since June    Shoulder Pain    Numbness     Near eye       Health Maintenance Due   Topic Date Due    COVID-19 Vaccine (1) Never done    TDAP/TD VACCINES (1 - Tdap) Never done    LIPID PANEL  09/03/2022    PAP SMEAR  01/07/2023    ANNUAL PHYSICAL  04/18/2023       History of Present Illness     Pt present with concerns for neck pain, right and right leg numbness, vision change in right eye and numbness of right side of her face.  She states sx started in June and she was seen by chiropractor.  She states she had xrays and had been treated for pinched nerve.  She states pain improved in July, but has recently worsened.  She states Friday last week her arm went numb, traveled down into her right leg and into right side of face.  She states she has had a few episodes since that time that occur in right arm, right side of face, and right leg.  She states her right leg feels like it goes to sleep, and notices it on the outside of the leg.  She feels like her leg is the least concerning symptom and is more concerned for right arm and face. She reports falling yesterday going up stairs but it unsure if it is related to numbness.  She reports poor vision in right eye and unsure if she is having blurred vision, but feels as though something is different.  She states right eye is more dry than normal.  Patient spouse is with her and states that they did not go to the ER when symptoms started as she was not experiencing facial drooping or symptoms associated with a stroke.  Patient reports history significant for anxiety and states at times she feels like she has experienced numbness and tingling in her body with anxiety attacks.  She states this does feel different than those prior episodes.  Also discussed with patient if she needed anything to help with anxiety and she declined at this  "time.  She states that she has taken antidepressants in the past and is very sensitive to medication.  Also discussed benefits of therapy as well as cognitive behavioral therapy with patient.  She verbalized understanding and denies additional needs for anxiety treatment at this time.    Vitals:    09/07/23 1015   BP: 127/85   BP Location: Right arm   Patient Position: Sitting   Cuff Size: Adult   Pulse: 89   Temp: 97.9 °F (36.6 °C)   TempSrc: Temporal   SpO2: 100%   Weight: 73.3 kg (161 lb 9.6 oz)   Height: 157.5 cm (62\")     Body mass index is 29.56 kg/m².    Current Outpatient Medications on File Prior to Visit   Medication Sig Dispense Refill    acetaminophen (TYLENOL) 500 MG tablet Take 1 tablet by mouth Every 6 (Six) Hours As Needed for Mild Pain.      ibuprofen (ADVIL,MOTRIN) 800 MG tablet       vitamin B-12 (CYANOCOBALAMIN) 100 MCG tablet Take 0.5 tablets by mouth Daily.      [DISCONTINUED] baclofen (LIORESAL) 10 MG tablet Take 1 tablet by mouth 3 (Three) Times a Day. 15 tablet 0    [DISCONTINUED] Cholecalciferol (VITAMIN D3) 1000 units capsule Take 4,000 Units by mouth Daily. Take four  gummies daily(1000 each)      [DISCONTINUED] vitamin C (ASCORBIC ACID) 250 MG tablet Take 250 mg by mouth Daily.       No current facility-administered medications on file prior to visit.       The following portions of the patient's history were reviewed and updated as appropriate: allergies, current medications, past family history, past medical history, past social history, past surgical history, and problem list.    Review of Systems   Musculoskeletal:  Positive for neck pain.   Neurological:  Positive for numbness (Right side of face, right more arm, and right leg).     Objective   Physical Exam  Vitals and nursing note reviewed.   Constitutional:       Appearance: Normal appearance. She is well-developed.   HENT:      Head: Normocephalic and atraumatic.      Right Ear: External ear normal.      Left Ear: External ear " normal.      Nose: Nose normal.   Eyes:      General: Lids are normal. Visual field deficit (Right eye -normal finding for patient) present.      Extraocular Movements: Extraocular movements intact.      Pupils: Pupils are equal, round, and reactive to light.   Cardiovascular:      Rate and Rhythm: Normal rate and regular rhythm.      Heart sounds: Normal heart sounds.   Pulmonary:      Effort: Pulmonary effort is normal.      Breath sounds: Normal breath sounds.   Abdominal:      General: Bowel sounds are normal.      Palpations: Abdomen is soft.   Genitourinary:     Vagina: Normal.   Musculoskeletal:         General: Normal range of motion.        Arms:       Cervical back: Normal range of motion and neck supple. Pain with movement present.      Thoracic back: Normal.      Lumbar back: Normal.      Comments: Cervical range of motion within normal limits in all fields, however pain on right side increased with left rotation.    Positive Spurling maneuver right side    Right upper extremity strength 4/5  Left upper extremity strength 5/5  Right lower extremity strength 4/5  Left lower extremity strength 5/5     Skin:     General: Skin is warm and dry.   Neurological:      General: No focal deficit present.      Mental Status: She is alert and oriented to person, place, and time.      Cranial Nerves: Cranial nerves 2-12 are intact. No facial asymmetry.      Sensory: Sensation is intact.      Motor: Motor function is intact.      Coordination: Coordination is intact.      Gait: Gait is intact.   Psychiatric:         Mood and Affect: Mood normal. Mood is anxious. Affect is tearful.         Behavior: Behavior normal.         Judgment: Judgment normal.     PHQ-9 Total Score:      Assessment & Plan   Diagnoses and all orders for this visit:    1. Numbness and tingling of right side of face (Primary)  -     MRI Brain With & Without Contrast; Future  -     methocarbamol (ROBAXIN) 500 MG tablet; Take 1 tablet by mouth 3  (Three) Times a Day.  Dispense: 15 tablet; Refill: 0    2. Cervical radiculopathy  -     MRI Cervical Spine Without Contrast; Future  -     methocarbamol (ROBAXIN) 500 MG tablet; Take 1 tablet by mouth 3 (Three) Times a Day.  Dispense: 15 tablet; Refill: 0    3. Neck pain  -     MRI Cervical Spine Without Contrast; Future    4. Anxiety  Comments:  Discussed therapy and medication to help manage symptoms and patient declined at this time.  Encouraged her to call back for worsening        There are no Patient Instructions on file for this visit.

## 2023-09-08 ENCOUNTER — TELEPHONE (OUTPATIENT)
Dept: FAMILY MEDICINE CLINIC | Facility: CLINIC | Age: 38
End: 2023-09-08
Payer: OTHER GOVERNMENT

## 2023-09-08 NOTE — TELEPHONE ENCOUNTER
RECORDS REQUESTED FROM [facility] IN [place] - TAKES 2-3 WEEKS TO PROCESS - XRAYS ARE NOT DIGITAL AND WILL BE MAILED BUT HAVE TO BE COPIED AND RETURNED BACK TO THEM

## 2023-09-10 ENCOUNTER — APPOINTMENT (OUTPATIENT)
Dept: GENERAL RADIOLOGY | Facility: HOSPITAL | Age: 38
End: 2023-09-10
Payer: OTHER GOVERNMENT

## 2023-09-10 ENCOUNTER — APPOINTMENT (OUTPATIENT)
Dept: CT IMAGING | Facility: HOSPITAL | Age: 38
End: 2023-09-10
Payer: OTHER GOVERNMENT

## 2023-09-10 ENCOUNTER — HOSPITAL ENCOUNTER (EMERGENCY)
Facility: HOSPITAL | Age: 38
Discharge: HOME | End: 2023-09-10
Attending: EMERGENCY MEDICINE | Admitting: EMERGENCY MEDICINE
Payer: OTHER GOVERNMENT

## 2023-09-10 VITALS
DIASTOLIC BLOOD PRESSURE: 65 MMHG | HEART RATE: 88 BPM | TEMPERATURE: 98.6 F | RESPIRATION RATE: 16 BRPM | BODY MASS INDEX: 29.45 KG/M2 | WEIGHT: 160.05 LBS | HEIGHT: 62 IN | SYSTOLIC BLOOD PRESSURE: 122 MMHG | OXYGEN SATURATION: 96 %

## 2023-09-10 DIAGNOSIS — R20.2 PARESTHESIAS: Primary | ICD-10-CM

## 2023-09-10 PROCEDURE — 72040 X-RAY EXAM NECK SPINE 2-3 VW: CPT

## 2023-09-10 PROCEDURE — 70450 CT HEAD/BRAIN W/O DYE: CPT

## 2023-09-10 PROCEDURE — 99284 EMERGENCY DEPT VISIT MOD MDM: CPT

## 2023-09-10 NOTE — ED PROVIDER NOTES
Subjective   History of Present Illness  Patient is a 37-year-old female complaining of numbness and tingling to the right side of her face and right arm and right leg which is intermittent lasting minutes to hours at a time.  She denies headache neck pain dizziness vomiting or other complaint    Review of Systems    Past Medical History:   Diagnosis Date    B12 deficiency     Low back pain     Obesity     Sleep apnea     Thrombocytosis     Vitamin D deficiency        No Known Allergies    Past Surgical History:   Procedure Laterality Date     SECTION      CYSTOSCOPY      EYE SURGERY      GLAUCOMA SURGERY Right     2020    HYSTERECTOMY      RETINAL DETACHMENT SURGERY  2020       Family History   Problem Relation Age of Onset    Alcohol abuse Father     Arthritis Father     Diabetes Father     Hyperlipidemia Father     Anxiety disorder Mother        Social History     Socioeconomic History    Marital status:    Tobacco Use    Smoking status: Never     Passive exposure: Never    Smokeless tobacco: Never   Vaping Use    Vaping Use: Never used   Substance and Sexual Activity    Alcohol use: No    Drug use: No    Sexual activity: Yes     Partners: Male     Birth control/protection: None           Objective   Physical Exam  HEENT exam shows TMs to be clear    Lungs clear.  Neck is supple without meningismus.  She had no adenopathy JVD or bruits.  Neurologic exam is nonfocal.  Lungs clear.  Heart has regular rate rhythm without murmur.  Procedures           ED Course      XR Spine Cervical 2 or 3 View    Result Date: 9/10/2023  Impression: Mild degenerative disc disease C5-C6. Electronically Signed: Babak Alcala MD  9/10/2023 2:44 PM EDT  Workstation ID: [id number]    CT Head Without Contrast    Result Date: 9/10/2023  Impression: No acute process identified. Electronically Signed: Bolivar Clinton MD  9/10/2023 2:52 PM CDT  Workstation ID: [id number]                                        Medical Decision  Making  My interpretation patient's x-ray of her neck shows degenerative change at C5-C6.  CT scan shows no mass effect hemorrhage or other abnormality.  Patient will be discharged.  She will follow with MD after MRI scan which is scheduled this week.    Amount and/or Complexity of Data Reviewed  Radiology: ordered and independent interpretation performed.        Final diagnoses:   Paresthesias       ED Disposition  ED Disposition       ED Disposition   Discharge    Condition   Stable    Comment   --               No follow-up provider specified.       Medication List      No changes were made to your prescriptions during this visit.            Lucas Murphy MD  09/10/23 [id number]

## 2023-09-11 ENCOUNTER — TELEPHONE (OUTPATIENT)
Dept: FAMILY MEDICINE CLINIC | Facility: CLINIC | Age: 38
End: 2023-09-11
Payer: OTHER GOVERNMENT

## 2023-09-11 NOTE — TELEPHONE ENCOUNTER
Please call patient and make sure that she keeps her appointment this week for the MRI of her brain.  If symptoms seem to worsen she can return to the [facility] emergency room.

## 2023-09-13 ENCOUNTER — TELEPHONE (OUTPATIENT)
Dept: FAMILY MEDICINE CLINIC | Facility: CLINIC | Age: 38
End: 2023-09-13

## 2023-09-13 NOTE — TELEPHONE ENCOUNTER
Caller: Chrystal Walters    Relationship: Self    Best call back number: [phone]    Caller requesting test results: MRI RESULTS     What test was performed: MRI OF NECK AND BRAIN    When was the test performed: 09/13/23    Where was the test performed: [facility]    Additional notes: PATIENT IS REQUESTING THAT CARMEN RAVI MEDICAL ASSISTANT CALLS HER BACK FOR RESULTS     BEST TIME TO REACH PATIENT IS ANYTIME

## 2023-09-15 DIAGNOSIS — R20.2 NUMBNESS AND TINGLING OF RIGHT SIDE OF FACE: ICD-10-CM

## 2023-09-15 DIAGNOSIS — R20.0 NUMBNESS AND TINGLING OF RIGHT SIDE OF FACE: ICD-10-CM

## 2023-09-18 ENCOUNTER — PATIENT MESSAGE (OUTPATIENT)
Dept: FAMILY MEDICINE CLINIC | Facility: CLINIC | Age: 38
End: 2023-09-18
Payer: OTHER GOVERNMENT

## 2023-09-18 DIAGNOSIS — M54.12 CERVICAL RADICULOPATHY: ICD-10-CM

## 2023-09-18 DIAGNOSIS — M54.12 CERVICAL RADICULOPATHY: Primary | ICD-10-CM

## 2023-09-18 DIAGNOSIS — M54.2 NECK PAIN: ICD-10-CM

## 2023-10-02 DIAGNOSIS — R20.2 NUMBNESS AND TINGLING OF RIGHT SIDE OF FACE: ICD-10-CM

## 2023-10-02 DIAGNOSIS — M54.12 CERVICAL RADICULOPATHY: ICD-10-CM

## 2023-10-02 DIAGNOSIS — R20.0 NUMBNESS AND TINGLING OF RIGHT SIDE OF FACE: ICD-10-CM

## 2023-10-02 RX ORDER — METHOCARBAMOL 500 MG/1
500 TABLET, FILM COATED ORAL 3 TIMES DAILY
Qty: 7 TABLET | Refills: 0 | Status: SHIPPED | OUTPATIENT
Start: 2023-10-02

## 2023-10-16 ENCOUNTER — TELEPHONE (OUTPATIENT)
Dept: FAMILY MEDICINE CLINIC | Facility: CLINIC | Age: 38
End: 2023-10-16
Payer: OTHER GOVERNMENT

## 2023-10-16 DIAGNOSIS — M54.2 NECK PAIN: ICD-10-CM

## 2023-10-16 DIAGNOSIS — M54.12 CERVICAL RADICULOPATHY: Primary | ICD-10-CM

## 2023-10-16 NOTE — TELEPHONE ENCOUNTER
----- Message from Chrystal Walters sent at 10/16/2023  4:43 PM EDT -----  Regarding: Muscle Relaxer methocarbamol  Contact: [phone]  Okay ill give it a try just would like some relief from arm/neck pain.

## 2023-10-20 DIAGNOSIS — M54.2 NECK PAIN: ICD-10-CM

## 2023-10-20 DIAGNOSIS — M54.12 CERVICAL RADICULOPATHY: Primary | ICD-10-CM

## 2023-10-20 RX ORDER — DICLOFENAC SODIUM 75 MG/1
75 TABLET, DELAYED RELEASE ORAL 2 TIMES DAILY
Qty: 60 TABLET | Refills: 2 | Status: SHIPPED | OUTPATIENT
Start: 2023-10-20

## 2023-11-20 ENCOUNTER — PATIENT ROUNDING (BHMG ONLY) (OUTPATIENT)
Dept: PAIN MEDICINE | Facility: CLINIC | Age: 38
End: 2023-11-20
Payer: OTHER GOVERNMENT

## 2023-11-20 ENCOUNTER — OFFICE VISIT (OUTPATIENT)
Dept: PAIN MEDICINE | Facility: CLINIC | Age: 38
End: 2023-11-20
Payer: OTHER GOVERNMENT

## 2023-11-20 VITALS
SYSTOLIC BLOOD PRESSURE: 120 MMHG | RESPIRATION RATE: 16 BRPM | HEART RATE: 70 BPM | OXYGEN SATURATION: 98 % | DIASTOLIC BLOOD PRESSURE: 66 MMHG

## 2023-11-20 DIAGNOSIS — M54.12 CERVICAL RADICULOPATHY: Primary | ICD-10-CM

## 2023-11-20 DIAGNOSIS — M50.20 DISPLACEMENT OF CERVICAL INTERVERTEBRAL DISC WITHOUT MYELOPATHY: ICD-10-CM

## 2023-11-20 DIAGNOSIS — R20.2 NUMBNESS AND TINGLING OF RIGHT SIDE OF FACE: ICD-10-CM

## 2023-11-20 DIAGNOSIS — R20.0 NUMBNESS AND TINGLING OF RIGHT SIDE OF FACE: ICD-10-CM

## 2023-11-20 PROCEDURE — 99204 OFFICE O/P NEW MOD 45 MIN: CPT | Performed by: STUDENT IN AN ORGANIZED HEALTH CARE EDUCATION/TRAINING PROGRAM

## 2023-11-20 RX ORDER — METHYLPREDNISOLONE 4 MG/1
TABLET ORAL
Qty: 21 TABLET | Refills: 0 | Status: SHIPPED | OUTPATIENT
Start: 2023-11-20

## 2023-11-20 RX ORDER — METHOCARBAMOL 500 MG/1
500 TABLET, FILM COATED ORAL 3 TIMES DAILY
Qty: 90 TABLET | Refills: 0 | Status: SHIPPED | OUTPATIENT
Start: 2023-11-20

## 2023-11-20 RX ORDER — GABAPENTIN 100 MG/1
100 CAPSULE ORAL 3 TIMES DAILY PRN
Qty: 45 CAPSULE | Refills: 0 | Status: SHIPPED | OUTPATIENT
Start: 2023-11-20

## 2023-11-20 NOTE — PROGRESS NOTES
November 20, 2023    Hello, may I speak with Chrystal Walters?    My name is April       I am  with [facility]  [address]  [place] [address].    Before we get started may I verify your date of birth? 1985    I am calling to officially welcome you to our practice and ask about your recent visit. Is this a good time to talk? yes    Tell me about your visit with us. What things went well?  Everything went wonderful        We're always looking for ways to make our patients' experiences even better. Do you have recommendations on ways we may improve?  no    Overall were you satisfied with your first visit to our practice? yes       I appreciate you taking the time to speak with me today. Is there anything else I can do for you? no      Thank you, and have a great day.      
Parent(s)

## 2023-11-21 NOTE — PROGRESS NOTES
CHIEF COMPLAINT  Chief Complaint   Patient presents with    Neck Pain     New Patient referred for Cervical radiculopathy  No Opoid Use       Primary Care  Cheryl Vallejo, TAMIE    Subjective   Chrystal Walters is a 38 y.o. female  who presents for chronic neck pain and cervical radiculopathy.  She reports several months of significant neck pain and RUE radiculopathy, especially into the 1st and 2nd fingers.  She has no inciting event.  The pain has somewhat improved over the past month, but still provides significant discomfort, especially when turning her head and playing with her children.  She denies any myelopathic symptoms.  She does have an MRI which shows C5/6, C6/7 disc herniation which correlates with her symptoms.    History of Present Illness     Location: Neck with radiation into the RUE  Onset: Several months ago  Duration: Slightly improved  Timing: Constant throughout the day  Quality: Shooting, electric  Severity: Today: 4       Last Week: 5       Worst: 7  Modifying Factors: The pain is worse with activity and neck movement  Functional Deficit: The pain makes ADLs difficult    Physical Therapy: yes    Interval Update 11/20/2023:     The following portions of the patient's history were reviewed and updated as appropriate: allergies, current medications, past family history, past medical history, past social history, past surgical history, and problem list.    Procedures:        Current Outpatient Medications:     acetaminophen (TYLENOL) 500 MG tablet, Take 1 tablet by mouth Every 6 (Six) Hours As Needed for Mild Pain., Disp: , Rfl:     diclofenac (VOLTAREN) 75 MG EC tablet, Take 1 tablet by mouth 2 (Two) Times a Day., Disp: 60 tablet, Rfl: 2    methocarbamol (ROBAXIN) 500 MG tablet, Take 1 tablet by mouth 3 (Three) Times a Day., Disp: 90 tablet, Rfl: 0    gabapentin (NEURONTIN) 100 MG capsule, Take 1 capsule by mouth 3 (Three) Times a Day As Needed (Right arm pain)., Disp: 45 capsule, Rfl: 0     methylPREDNISolone (MEDROL) 4 MG dose pack, Take as directed on package instructions., Disp: 21 tablet, Rfl: 0    Review of Systems   Musculoskeletal:  Positive for myalgias, neck pain and neck stiffness. Negative for arthralgias, back pain and gait problem.   Neurological:  Positive for numbness. Negative for weakness.     Vitals:    11/20/23 1014   BP: 120/66   BP Location: Right arm   Patient Position: Sitting   Cuff Size: Adult   Pulse: 70   Resp: 16   SpO2: 98%   PainSc:   4   PainLoc: Neck       Urine Drug Screen: Pending  Appropriate: Pending    Objective   Physical Exam  Vitals and nursing note reviewed. Exam conducted with a chaperone present.   Constitutional:       General: She is not in acute distress.     Appearance: Normal appearance. She is normal weight.   Musculoskeletal:      Cervical back: Spasms and tenderness present. No swelling, signs of trauma, rigidity, bony tenderness or crepitus. Pain with movement present. Decreased range of motion.   Neurological:      Mental Status: She is alert.      Comments: Decreased sensation over the 1st two fingers on the RUE compared to the left.  Otherwise, sensation and strength intact       Assessment & Plan   Problems Addressed this Visit    None  Visit Diagnoses       Cervical radiculopathy    -  Primary    Relevant Medications    methocarbamol (ROBAXIN) 500 MG tablet    Displacement of cervical intervertebral disc without myelopathy        Numbness and tingling of right side of face        Relevant Medications    methocarbamol (ROBAXIN) 500 MG tablet          Diagnoses         Codes Comments    Cervical radiculopathy    -  Primary ICD-10-CM: M54.12  ICD-9-CM: 723.4     Displacement of cervical intervertebral disc without myelopathy     ICD-10-CM: M50.20  ICD-9-CM: 722.0     Numbness and tingling of right side of face     ICD-10-CM: R20.0, R20.2  ICD-9-CM: 782.0             Plan:  Will start with conservative treatment including robaxin, gabapentin, and  medrol dose ysabel.  She reports good benefit with flexeril but had significant sedation.    Her MRI is relatively reassuring as there is no high grade central or foraminal stenosis  If she does not improve over the next month, consider EDENILSON with catheter for more definitive steroid placement  She will likely have significant improvement.  --- Follow-up 1 mos.           INSPECT REPORT    As part of the patient's treatment plan, I may be prescribing controlled substances. The patient has been made aware of appropriate use of such medications, including potential risk of somnolence, limited ability to drive and/or work safely, and the potential for dependence or overdose. It has also bee made clear that these medications are for use by this patient only, without concomitant use of alcohol or other substances unless prescribed.     Patient has completed prescribing agreement detailing terms of continued prescribing of controlled substances, including monitoring SHER reports, urine drug screening, and pill counts if necessary. The patient is aware that inappropriate use will results in cessation of prescribing such medications.    INSPECT report has been reviewed and scanned into the patient's chart.    As the clinician, I personally reviewed the INSPECT from 11/17/23 .    History and physical exam exhibit continued safe and appropriate use of controlled substances.      EMR Dragon/Transcription disclaimer:   Much of this encounter note is an electronic transcription/translation of spoken language to printed text. The electronic translation of spoken language may permit erroneous, or at times, nonsensical words or phrases to be inadvertently transcribed; Although I have reviewed the note for such errors, some may still exist.

## 2023-12-18 ENCOUNTER — OFFICE VISIT (OUTPATIENT)
Dept: PAIN MEDICINE | Facility: CLINIC | Age: 38
End: 2023-12-18
Payer: OTHER GOVERNMENT

## 2023-12-18 VITALS
WEIGHT: 170.2 LBS | SYSTOLIC BLOOD PRESSURE: 123 MMHG | DIASTOLIC BLOOD PRESSURE: 88 MMHG | RESPIRATION RATE: 16 BRPM | BODY MASS INDEX: 31.13 KG/M2 | OXYGEN SATURATION: 99 % | HEART RATE: 82 BPM

## 2023-12-18 DIAGNOSIS — M54.12 CERVICAL RADICULOPATHY: ICD-10-CM

## 2023-12-18 DIAGNOSIS — R20.2 NUMBNESS AND TINGLING OF RIGHT SIDE OF FACE: ICD-10-CM

## 2023-12-18 DIAGNOSIS — R20.0 NUMBNESS AND TINGLING OF RIGHT SIDE OF FACE: ICD-10-CM

## 2023-12-18 RX ORDER — GABAPENTIN 100 MG/1
100 CAPSULE ORAL 3 TIMES DAILY PRN
Qty: 45 CAPSULE | Refills: 0 | Status: SHIPPED | OUTPATIENT
Start: 2023-12-18

## 2023-12-18 RX ORDER — METHOCARBAMOL 500 MG/1
500 TABLET, FILM COATED ORAL 3 TIMES DAILY
Qty: 90 TABLET | Refills: 0 | Status: SHIPPED | OUTPATIENT
Start: 2023-12-18

## 2023-12-18 NOTE — PROGRESS NOTES
CHIEF COMPLAINT  Chief Complaint   Patient presents with    Neck Pain     1 month f/u  CC  Chronic Neck Pain  No Opoid Use       Primary Care  Cheryl Vallejo, APRN    Subjective   Chrystal Walters is a 38 y.o. female  who presents for chronic neck pain and cervical radiculopathy.  She reports several months of significant neck pain and RUE radiculopathy, especially into the 1st and 2nd fingers.  She has no inciting event.  The pain has somewhat improved over the past month, but still provides significant discomfort, especially when turning her head and playing with her children.  She denies any myelopathic symptoms.  She does have an MRI which shows C5/6, C6/7 disc herniation which correlates with her symptoms.    History of Present Illness     Location: Neck with radiation into the RUE  Onset: Several months ago  Duration: Slightly improved  Timing: Constant throughout the day  Quality: Shooting, electric  Severity: Today: 4       Last Week: 5       Worst: 7  Modifying Factors: The pain is worse with activity and neck movement  Functional Deficit: The pain makes ADLs difficult    Physical Therapy: yes    Interval Update 12/18/2023: She gets benefit with Robaxin and gabapentin when she takes the medication however she continues to have a baseline level of pain.    The following portions of the patient's history were reviewed and updated as appropriate: allergies, current medications, past family history, past medical history, past social history, past surgical history, and problem list.    Procedures:        Current Outpatient Medications:     acetaminophen (TYLENOL) 500 MG tablet, Take 1 tablet by mouth Every 6 (Six) Hours As Needed for Mild Pain., Disp: , Rfl:     diclofenac (VOLTAREN) 75 MG EC tablet, Take 1 tablet by mouth 2 (Two) Times a Day., Disp: 60 tablet, Rfl: 2    gabapentin (NEURONTIN) 100 MG capsule, Take 1 capsule by mouth 3 (Three) Times a Day As Needed (Right arm pain)., Disp: 45 capsule, Rfl: 0     methocarbamol (ROBAXIN) 500 MG tablet, Take 1 tablet by mouth 3 (Three) Times a Day., Disp: 90 tablet, Rfl: 0    Review of Systems   Musculoskeletal:  Positive for myalgias, neck pain and neck stiffness. Negative for arthralgias, back pain and gait problem.   Neurological:  Positive for numbness. Negative for weakness.       Vitals:    12/18/23 1000   BP: 123/88   BP Location: Right arm   Patient Position: Sitting   Cuff Size: Adult   Pulse: 82   Resp: 16   SpO2: 99%   Weight: 77.2 kg (170 lb 3.2 oz)   PainSc:   4   PainLoc: Neck       Urine Drug Screen: Pending  Appropriate: Pending    Objective   Physical Exam  Vitals and nursing note reviewed. Exam conducted with a chaperone present.   Constitutional:       General: She is not in acute distress.     Appearance: Normal appearance. She is normal weight.   Musculoskeletal:      Cervical back: Spasms and tenderness present. No swelling, signs of trauma, rigidity, bony tenderness or crepitus. Pain with movement present. Decreased range of motion.   Neurological:      Mental Status: She is alert.      Comments: Decreased sensation over the 1st two fingers on the RUE compared to the left.  Otherwise, sensation and strength intact         Assessment & Plan   Problems Addressed this Visit    None  Visit Diagnoses       Cervical radiculopathy        Relevant Medications    methocarbamol (ROBAXIN) 500 MG tablet    Other Relevant Orders    Epidural Block    Numbness and tingling of right side of face        Relevant Medications    methocarbamol (ROBAXIN) 500 MG tablet    Other Relevant Orders    Epidural Block          Diagnoses         Codes Comments    Cervical radiculopathy     ICD-10-CM: M54.12  ICD-9-CM: 723.4     Numbness and tingling of right side of face     ICD-10-CM: R20.0, R20.2  ICD-9-CM: 782.0             Plan:  Plan for cervical epidural to C5 with catheter  Her MRI is relatively reassuring as there is no high grade central or foraminal stenosis  Continue  Robaxin and gabapentin  --- Follow-up next available for EDENILSON with catheter to C5           INSPECT REPORT    As part of the patient's treatment plan, I may be prescribing controlled substances. The patient has been made aware of appropriate use of such medications, including potential risk of somnolence, limited ability to drive and/or work safely, and the potential for dependence or overdose. It has also bee made clear that these medications are for use by this patient only, without concomitant use of alcohol or other substances unless prescribed.     Patient has completed prescribing agreement detailing terms of continued prescribing of controlled substances, including monitoring SHER reports, urine drug screening, and pill counts if necessary. The patient is aware that inappropriate use will results in cessation of prescribing such medications.    INSPECT report has been reviewed and scanned into the patient's chart.    As the clinician, I personally reviewed the INSPECT from 12/15/2023.    History and physical exam exhibit continued safe and appropriate use of controlled substances.      EMR Dragon/Transcription disclaimer:   Much of this encounter note is an electronic transcription/translation of spoken language to printed text. The electronic translation of spoken language may permit erroneous, or at times, nonsensical words or phrases to be inadvertently transcribed; Although I have reviewed the note for such errors, some may still exist.

## 2023-12-27 ENCOUNTER — HOSPITAL ENCOUNTER (OUTPATIENT)
Dept: PAIN MEDICINE | Facility: HOSPITAL | Age: 38
Discharge: HOME | End: 2023-12-27
Payer: OTHER GOVERNMENT

## 2023-12-27 VITALS
BODY MASS INDEX: 31.28 KG/M2 | DIASTOLIC BLOOD PRESSURE: 79 MMHG | HEART RATE: 73 BPM | SYSTOLIC BLOOD PRESSURE: 116 MMHG | RESPIRATION RATE: 16 BRPM | TEMPERATURE: 97.3 F | HEIGHT: 62 IN | OXYGEN SATURATION: 99 % | WEIGHT: 170 LBS

## 2023-12-27 DIAGNOSIS — R52 PAIN: ICD-10-CM

## 2023-12-27 DIAGNOSIS — R20.0 NUMBNESS AND TINGLING OF RIGHT SIDE OF FACE: ICD-10-CM

## 2023-12-27 DIAGNOSIS — M54.12 CERVICAL RADICULOPATHY: Primary | ICD-10-CM

## 2023-12-27 DIAGNOSIS — R20.2 NUMBNESS AND TINGLING OF RIGHT SIDE OF FACE: ICD-10-CM

## 2023-12-27 PROCEDURE — 25010000002 BUPIVACAINE (PF) 0.25 % SOLUTION: Performed by: STUDENT IN AN ORGANIZED HEALTH CARE EDUCATION/TRAINING PROGRAM

## 2023-12-27 PROCEDURE — 25010000002 METHYLPREDNISOLONE PER 40 MG: Performed by: STUDENT IN AN ORGANIZED HEALTH CARE EDUCATION/TRAINING PROGRAM

## 2023-12-27 PROCEDURE — 25510000001 IOPAMIDOL 41 % SOLUTION: Performed by: STUDENT IN AN ORGANIZED HEALTH CARE EDUCATION/TRAINING PROGRAM

## 2023-12-27 PROCEDURE — 77003 FLUOROGUIDE FOR SPINE INJECT: CPT

## 2023-12-27 RX ORDER — BUPIVACAINE HYDROCHLORIDE 2.5 MG/ML
10 INJECTION, SOLUTION EPIDURAL; INFILTRATION; INTRACAUDAL ONCE
Status: COMPLETED | OUTPATIENT
Start: 2023-12-27 | End: 2023-12-27

## 2023-12-27 RX ORDER — METHYLPREDNISOLONE ACETATE 40 MG/ML
40 INJECTION, SUSPENSION INTRA-ARTICULAR; INTRALESIONAL; INTRAMUSCULAR; SOFT TISSUE ONCE
Status: COMPLETED | OUTPATIENT
Start: 2023-12-27 | End: 2023-12-27

## 2023-12-27 RX ORDER — IOPAMIDOL 408 MG/ML
3 INJECTION, SOLUTION INTRATHECAL
Status: COMPLETED | OUTPATIENT
Start: 2023-12-27 | End: 2023-12-27

## 2023-12-27 RX ADMIN — IOPAMIDOL 3 ML: 408 INJECTION, SOLUTION INTRATHECAL at 09:25

## 2023-12-27 RX ADMIN — METHYLPREDNISOLONE ACETATE 40 MG: 40 INJECTION, SUSPENSION INTRA-ARTICULAR; INTRALESIONAL; INTRAMUSCULAR; INTRASYNOVIAL; SOFT TISSUE at 09:25

## 2023-12-27 RX ADMIN — BUPIVACAINE HYDROCHLORIDE 3 ML: 2.5 INJECTION, SOLUTION EPIDURAL; INFILTRATION; INTRACAUDAL; PERINEURAL at 09:25

## 2023-12-27 NOTE — PROCEDURES
Cervical Epidural Steroid Injection with catheter  [facility]    PREOPERATIVE DIAGNOSIS:  Cervical Radiculopathy, Cervical Spinal Stenosis, and Cervical Disc Displacement  POSTOPERATIVE DIAGNOSIS:  Same as preop diagnosis    PROCEDURE:  Cervical Epidural Steroid Injection, Therapeutic Translaminar Injection, with epidurogram, at   T1-T2 level with catheter to the C5 level    PRE-PROCEDURE DISCUSSION WITH PATIENT:    Risks and complications were discussed with the patient prior to starting the procedure and informed consent was obtained.  We discussed various topics including but not limited to bleeding, infection, injury, paralysis, nerve injury, dural puncture, coma, death, worsening of clinical picture, lack of pain relief, and postprocedural soreness.    SURGEON:  Jai Simpson MD    REASON FOR PROCEDURE:   Diagnostic injection at this level is needed, Degenerative changes are noted in the area., Stenotic area is noted, and is likely contributing to this chronic &/or recurrent pain. , and Radiating pattern of pain is likely consistent with degenerative changes in the area.    SEDATION:  Patient declined administration of moderate sedation    ANESTHETIC:  injectable LAs: Marcaine 0.25%  STEROID:   Methylprednisolone (DEPO MEDROL) 40mg/ml    DESCRIPTON OF PROCEDURE:  After obtaining informed consent, I.V. was not started in the preop area.   The patient was taken to the operating room and placed in the prone position.   All pressure points were well padded.  The cervicothoracic spine area was prepped with Chloraprep and draped in a sterile fashion.  Under fluoroscopic guidance, the aforementioned  interlaminar space was identified. Skin and subcutaneous tissues were anesthetized with 1% lidocaine in the middle of the space. A 17G Tuohy needle was introduced through the skin and advanced to this interlaminar space and into the epidural space under fluoroscopic guidance and verified with loss-of-resistance  technique to air.  After confirming the position of the needle with the fluoroscope with all the views, a 20G radiopaque catheter was advanced through the needle into the epidural space and placement was again verified under fluoroscopy.  The catheter was easily advanced midline until the tip reached the body of C5.  Aspiration was again negative for blood and CSF.  Next, 1.5cc of contrast was injected under real-time fluoroscopy demonstrating adequate epidural spread without intravascular or intrathecal spread.   After seeing appropriate epidurogram with lateral and PA views, a total of 4 cc solution was injected, consisting of 3cc of local anesthetic as above, and injectable steroid as above.     ESTIMATED BLOOD LOSS:  <5 mL  SPECIMENS:  None    COMPLICATIONS:     No complications were noted., There was no indication of vascular uptake on live injection of contrast dye., There was no indication of intrathecal uptake on live injection of contrast dye., and There was not any evidence of dural puncture.      TOLERANCE & DISCHARGE CONDITION:    The patient tolerated the procedure well.  The patient was transported to the recovery area without difficulties.  The patient was discharged to home under the care of family in stable and satisfactory condition.    PLAN OF CARE:  The patient was given our standard instruction sheet.        2.   The patient will Return to clinic 3-4 wks.  3.    The patient will resume all medications as per the medication reconciliation sheet.

## 2023-12-27 NOTE — DISCHARGE INSTRUCTIONS

## 2024-01-05 RX ORDER — GABAPENTIN 100 MG/1
100 CAPSULE ORAL 3 TIMES DAILY PRN
Qty: 45 CAPSULE | Refills: 0 | Status: SHIPPED | OUTPATIENT
Start: 2024-01-05

## 2024-01-29 ENCOUNTER — OFFICE VISIT (OUTPATIENT)
Dept: PAIN MEDICINE | Facility: CLINIC | Age: 39
End: 2024-01-29
Payer: OTHER GOVERNMENT

## 2024-01-29 VITALS
HEART RATE: 84 BPM | WEIGHT: 173.5 LBS | BODY MASS INDEX: 31.73 KG/M2 | RESPIRATION RATE: 16 BRPM | DIASTOLIC BLOOD PRESSURE: 58 MMHG | OXYGEN SATURATION: 98 % | SYSTOLIC BLOOD PRESSURE: 125 MMHG

## 2024-01-29 DIAGNOSIS — R20.2 NUMBNESS AND TINGLING OF RIGHT SIDE OF FACE: ICD-10-CM

## 2024-01-29 DIAGNOSIS — M54.12 CERVICAL RADICULOPATHY: Primary | ICD-10-CM

## 2024-01-29 DIAGNOSIS — R20.0 NUMBNESS AND TINGLING OF RIGHT SIDE OF FACE: ICD-10-CM

## 2024-01-29 PROCEDURE — 99214 OFFICE O/P EST MOD 30 MIN: CPT | Performed by: STUDENT IN AN ORGANIZED HEALTH CARE EDUCATION/TRAINING PROGRAM

## 2024-01-29 NOTE — PROGRESS NOTES
CHIEF COMPLAINT  Chief Complaint   Patient presents with    Neck Pain     3-4 wk f/u from Cervical epidural  No Opoid Use        Primary Care  Cheryl Vallejo, TAMIE    Subjective   Chrystal Walters is a 38 y.o. female  who presents for chronic neck pain and cervical radiculopathy.  She reports several months of significant neck pain and RUE radiculopathy, especially into the 1st and 2nd fingers.  She has no inciting event.  The pain has somewhat improved over the past month, but still provides significant discomfort, especially when turning her head and playing with her children.  She denies any myelopathic symptoms.  She does have an MRI which shows C5/6, C6/7 disc herniation which correlates with her symptoms.    History of Present Illness     Location: Neck with radiation into the RUE  Onset: Several months ago  Duration: Slightly improved  Timing: Constant throughout the day  Quality: Shooting, electric  Severity: Today: 4       Last Week: 5       Worst: 7  Modifying Factors: The pain is worse with activity and neck movement  Functional Deficit: The pain makes ADLs difficult    Physical Therapy: yes    Interval Update 01/29/2024: She is now status post cervical epidural.  Initially, she had an exacerbation of her pain but now her pain is improving.  Overall, doing better.  Still having some myofascial pain, but no more numbness and tingling    The following portions of the patient's history were reviewed and updated as appropriate: allergies, current medications, past family history, past medical history, past social history, past surgical history, and problem list.    Procedures:  12/27/2023: EDENILSON: 90% benefit      Current Outpatient Medications:     acetaminophen (TYLENOL) 500 MG tablet, Take 1 tablet by mouth Every 6 (Six) Hours As Needed for Mild Pain., Disp: , Rfl:     diclofenac (VOLTAREN) 75 MG EC tablet, Take 1 tablet by mouth 2 (Two) Times a Day., Disp: 60 tablet, Rfl: 2    gabapentin (NEURONTIN) 100 MG  capsule, Take 1 capsule by mouth 3 (Three) Times a Day As Needed (Right arm pain)., Disp: 45 capsule, Rfl: 0    methocarbamol (ROBAXIN) 500 MG tablet, Take 1 tablet by mouth 3 (Three) Times a Day., Disp: 90 tablet, Rfl: 0    Review of Systems   Musculoskeletal:  Positive for myalgias, neck pain and neck stiffness. Negative for arthralgias, back pain and gait problem.   Neurological:  Positive for numbness. Negative for weakness.       Vitals:    01/29/24 1003   BP: 125/58   BP Location: Left arm   Patient Position: Sitting   Cuff Size: Adult   Pulse: 84   Resp: 16   SpO2: 98%   Weight: 78.7 kg (173 lb 8 oz)   PainSc:   2   PainLoc: Neck       Urine Drug Screen: Pending  Appropriate: Pending    Objective   Physical Exam  Vitals and nursing note reviewed. Exam conducted with a chaperone present.   Constitutional:       General: She is not in acute distress.     Appearance: Normal appearance. She is normal weight.   Musculoskeletal:      Cervical back: Spasms and tenderness present. No swelling, signs of trauma, rigidity, bony tenderness or crepitus. Pain with movement present. Decreased range of motion.   Neurological:      Mental Status: She is alert.      Comments: Decreased sensation over the 1st two fingers on the RUE compared to the left.  Otherwise, sensation and strength intact         Assessment & Plan   Problems Addressed this Visit    None  Visit Diagnoses       Cervical radiculopathy    -  Primary    Numbness and tingling of right side of face              Diagnoses         Codes Comments    Cervical radiculopathy    -  Primary ICD-10-CM: M54.12  ICD-9-CM: 723.4     Numbness and tingling of right side of face     ICD-10-CM: R20.0, R20.2  ICD-9-CM: 782.0             Plan:  Doing much better after cervical epidural with catheter  Can continue rare Robaxin.  She can continue to wean off of gabapentin  --- Follow-up 2 to 3 months           INSPECT REPORT    As part of the patient's treatment plan, I may be  prescribing controlled substances. The patient has been made aware of appropriate use of such medications, including potential risk of somnolence, limited ability to drive and/or work safely, and the potential for dependence or overdose. It has also bee made clear that these medications are for use by this patient only, without concomitant use of alcohol or other substances unless prescribed.     Patient has completed prescribing agreement detailing terms of continued prescribing of controlled substances, including monitoring SHER reports, urine drug screening, and pill counts if necessary. The patient is aware that inappropriate use will results in cessation of prescribing such medications.    INSPECT report has been reviewed and scanned into the patient's chart.    As the clinician, I personally reviewed the INSPECT from 1/26/2024.    History and physical exam exhibit continued safe and appropriate use of controlled substances.      EMR Dragon/Transcription disclaimer:   Much of this encounter note is an electronic transcription/translation of spoken language to printed text. The electronic translation of spoken language may permit erroneous, or at times, nonsensical words or phrases to be inadvertently transcribed; Although I have reviewed the note for such errors, some may still exist.

## 2024-04-01 ENCOUNTER — OFFICE VISIT (OUTPATIENT)
Dept: FAMILY MEDICINE CLINIC | Facility: CLINIC | Age: 39
End: 2024-04-01
Payer: OTHER GOVERNMENT

## 2024-04-01 VITALS
HEIGHT: 62 IN | SYSTOLIC BLOOD PRESSURE: 122 MMHG | WEIGHT: 173 LBS | HEART RATE: 86 BPM | BODY MASS INDEX: 31.83 KG/M2 | TEMPERATURE: 97.4 F | OXYGEN SATURATION: 98 % | DIASTOLIC BLOOD PRESSURE: 84 MMHG

## 2024-04-01 DIAGNOSIS — F41.8 MIXED ANXIETY DEPRESSIVE DISORDER: ICD-10-CM

## 2024-04-01 DIAGNOSIS — E53.8 B12 DEFICIENCY: ICD-10-CM

## 2024-04-01 DIAGNOSIS — E66.9 OBESITY (BMI 30-39.9): ICD-10-CM

## 2024-04-01 DIAGNOSIS — E55.9 VITAMIN D DEFICIENCY: ICD-10-CM

## 2024-04-01 DIAGNOSIS — Z00.01 ENCOUNTER FOR ANNUAL GENERAL MEDICAL EXAMINATION WITH ABNORMAL FINDINGS IN ADULT: Primary | ICD-10-CM

## 2024-04-01 DIAGNOSIS — R41.840 DIFFICULTY CONCENTRATING: ICD-10-CM

## 2024-04-01 DIAGNOSIS — R79.89 ELEVATED FERRITIN: ICD-10-CM

## 2024-04-01 LAB
25(OH)D3 SERPL-MCNC: 19 NG/ML (ref 30–100)
ALBUMIN SERPL-MCNC: 4.8 G/DL (ref 3.5–5.2)
ALBUMIN/GLOB SERPL: 1.5 G/DL
ALP SERPL-CCNC: 59 U/L (ref 39–117)
ALT SERPL W P-5'-P-CCNC: 17 U/L (ref 1–33)
ANION GAP SERPL CALCULATED.3IONS-SCNC: 13.6 MMOL/L (ref 5–15)
AST SERPL-CCNC: 19 U/L (ref 1–32)
BASOPHILS # BLD AUTO: 0.03 10*3/MM3 (ref 0–0.2)
BASOPHILS NFR BLD AUTO: 0.5 % (ref 0–1.5)
BILIRUB SERPL-MCNC: 0.5 MG/DL (ref 0–1.2)
BUN SERPL-MCNC: 12 MG/DL (ref 6–20)
BUN/CREAT SERPL: 15.2 (ref 7–25)
CALCIUM SPEC-SCNC: 10.2 MG/DL (ref 8.6–10.5)
CHLORIDE SERPL-SCNC: 101 MMOL/L (ref 98–107)
CHOLEST SERPL-MCNC: 260 MG/DL (ref 0–200)
CO2 SERPL-SCNC: 21.4 MMOL/L (ref 22–29)
CREAT SERPL-MCNC: 0.79 MG/DL (ref 0.57–1)
DEPRECATED RDW RBC AUTO: 38.7 FL (ref 37–54)
EGFRCR SERPLBLD CKD-EPI 2021: 98.9 ML/MIN/1.73
EOSINOPHIL # BLD AUTO: 0.03 10*3/MM3 (ref 0–0.4)
EOSINOPHIL NFR BLD AUTO: 0.5 % (ref 0.3–6.2)
ERYTHROCYTE [DISTWIDTH] IN BLOOD BY AUTOMATED COUNT: 11.7 % (ref 12.3–15.4)
FERRITIN SERPL-MCNC: 305 NG/ML (ref 13–150)
GLOBULIN UR ELPH-MCNC: 3.3 GM/DL
GLUCOSE SERPL-MCNC: 91 MG/DL (ref 65–99)
HCT VFR BLD AUTO: 39.4 % (ref 34–46.6)
HDLC SERPL-MCNC: 55 MG/DL (ref 40–60)
HGB BLD-MCNC: 13.9 G/DL (ref 12–15.9)
IMM GRANULOCYTES # BLD AUTO: 0.01 10*3/MM3 (ref 0–0.05)
IMM GRANULOCYTES NFR BLD AUTO: 0.2 % (ref 0–0.5)
LDLC SERPL CALC-MCNC: 185 MG/DL (ref 0–100)
LDLC/HDLC SERPL: 3.31 {RATIO}
LYMPHOCYTES # BLD AUTO: 1.73 10*3/MM3 (ref 0.7–3.1)
LYMPHOCYTES NFR BLD AUTO: 29 % (ref 19.6–45.3)
MCH RBC QN AUTO: 32.6 PG (ref 26.6–33)
MCHC RBC AUTO-ENTMCNC: 35.3 G/DL (ref 31.5–35.7)
MCV RBC AUTO: 92.5 FL (ref 79–97)
MONOCYTES # BLD AUTO: 0.45 10*3/MM3 (ref 0.1–0.9)
MONOCYTES NFR BLD AUTO: 7.5 % (ref 5–12)
NEUTROPHILS NFR BLD AUTO: 3.72 10*3/MM3 (ref 1.7–7)
NEUTROPHILS NFR BLD AUTO: 62.3 % (ref 42.7–76)
NRBC BLD AUTO-RTO: 0 /100 WBC (ref 0–0.2)
PLATELET # BLD AUTO: 476 10*3/MM3 (ref 140–450)
PMV BLD AUTO: 8.7 FL (ref 6–12)
POTASSIUM SERPL-SCNC: 4 MMOL/L (ref 3.5–5.2)
PROT SERPL-MCNC: 8.1 G/DL (ref 6–8.5)
RBC # BLD AUTO: 4.26 10*6/MM3 (ref 3.77–5.28)
SODIUM SERPL-SCNC: 136 MMOL/L (ref 136–145)
TRIGL SERPL-MCNC: 114 MG/DL (ref 0–150)
TSH SERPL DL<=0.05 MIU/L-ACNC: 0.98 UIU/ML (ref 0.27–4.2)
VIT B12 BLD-MCNC: 672 PG/ML (ref 211–946)
VLDLC SERPL-MCNC: 20 MG/DL (ref 5–40)
WBC NRBC COR # BLD AUTO: 5.97 10*3/MM3 (ref 3.4–10.8)

## 2024-04-01 PROCEDURE — 82607 VITAMIN B-12: CPT | Performed by: NURSE PRACTITIONER

## 2024-04-01 PROCEDURE — 82728 ASSAY OF FERRITIN: CPT | Performed by: NURSE PRACTITIONER

## 2024-04-01 PROCEDURE — 80053 COMPREHEN METABOLIC PANEL: CPT | Performed by: NURSE PRACTITIONER

## 2024-04-01 PROCEDURE — 84443 ASSAY THYROID STIM HORMONE: CPT | Performed by: NURSE PRACTITIONER

## 2024-04-01 PROCEDURE — 80061 LIPID PANEL: CPT | Performed by: NURSE PRACTITIONER

## 2024-04-01 PROCEDURE — 82306 VITAMIN D 25 HYDROXY: CPT | Performed by: NURSE PRACTITIONER

## 2024-04-01 PROCEDURE — 85025 COMPLETE CBC W/AUTO DIFF WBC: CPT | Performed by: NURSE PRACTITIONER

## 2024-04-01 NOTE — PROGRESS NOTES
Venipuncture performed on Left Arm by Nicole Mccarthy MA  with good hemostasis. Patient tolerated well. 04/01/24

## 2024-04-01 NOTE — PROGRESS NOTES
Subjective   Chrystal Walters is a 38 y.o. female presents for   Chief Complaint   Patient presents with    Hyperlipidemia     Annual     Anxiety       Health Maintenance Due   Topic Date Due    LIPID PANEL  09/03/2022    PAP SMEAR  01/07/2023    ANNUAL PHYSICAL  04/18/2023       Hyperlipidemia    Anxiety           Pt present for annual exam.  She also reports concerns for problems losing weight as well as difficulty concentrating.  She reports concern concern for weight gain and difficulty losing weight.  She states she stays active when working, but does not do additional exercise outside of work that primarily includes walking.  She states that she has had a previous right shoulder injury and is currently in pain management for management of right shoulder pain.  She has also done Keto in the past and has recently been working on increasing protein intake and exercising more.   She reports that her son has ADHD and she feels like she has a lot of similar symptoms and her  has been urging her to get evaluated.  She states she avoids making decisions, hyperfocuses, frequently talks a lot and is hyperactive.  She is always staying busy, and has difficulty concentrating and listening.  She states that she almost canceled her appointment due to not wanting to talk about it as that also increases her anxiety.  Discussed with patient if she felt like she needed medication while waiting for her appointment for further evaluation, and she states that she would like to hold off on medication at this time.  Discussed importance of balanced diet and routine exercise.  Patient provided information on calorie counting for weight loss as well as information for yoga and Pilates to help with strengthening.  Patient also counseled on current recommended vaccines which she declined today.    Vitals:    04/01/24 1246   BP: 122/84   BP Location: Left arm   Patient Position: Sitting   Cuff Size: Adult   Pulse: 86   Temp: 97.4  "°F (36.3 °C)   TempSrc: Temporal   SpO2: 98%   Weight: 78.5 kg (173 lb)   Height: 157.5 cm (62\")     Body mass index is 31.64 kg/m².    Current Outpatient Medications on File Prior to Visit   Medication Sig Dispense Refill    acetaminophen (TYLENOL) 500 MG tablet Take 1 tablet by mouth Every 6 (Six) Hours As Needed for Mild Pain.      gabapentin (NEURONTIN) 100 MG capsule Take 1 capsule by mouth 3 (Three) Times a Day As Needed (Right arm pain). 45 capsule 0    methocarbamol (ROBAXIN) 500 MG tablet Take 1 tablet by mouth 3 (Three) Times a Day. 90 tablet 0    [DISCONTINUED] diclofenac (VOLTAREN) 75 MG EC tablet Take 1 tablet by mouth 2 (Two) Times a Day. 60 tablet 2     No current facility-administered medications on file prior to visit.       The following portions of the patient's history were reviewed and updated as appropriate: allergies, current medications, past family history, past medical history, past social history, past surgical history, and problem list.    Review of Systems    Objective   Physical Exam  Vitals and nursing note reviewed.   Constitutional:       Appearance: Normal appearance. She is well-developed. She is obese.   HENT:      Head: Normocephalic and atraumatic.      Right Ear: External ear normal.      Left Ear: External ear normal.      Nose: Nose normal.   Eyes:      Extraocular Movements: Extraocular movements intact.      Pupils: Pupils are equal, round, and reactive to light.   Cardiovascular:      Rate and Rhythm: Normal rate and regular rhythm.      Heart sounds: Normal heart sounds.   Pulmonary:      Effort: Pulmonary effort is normal.      Breath sounds: Normal breath sounds.   Abdominal:      General: Bowel sounds are normal.      Palpations: Abdomen is soft.   Genitourinary:     Vagina: Normal.   Musculoskeletal:         General: Normal range of motion.      Cervical back: Normal range of motion and neck supple.   Skin:     General: Skin is warm and dry.   Neurological:      " General: No focal deficit present.      Mental Status: She is alert and oriented to person, place, and time.   Psychiatric:         Attention and Perception: Attention normal.         Mood and Affect: Mood is anxious.         Speech: Speech normal.         Behavior: Behavior is cooperative.         Cognition and Memory: Cognition normal.         Judgment: Judgment normal.       PHQ-9 Total Score:      Assessment & Plan   Diagnoses and all orders for this visit:    1. Encounter for annual general medical examination with abnormal findings in adult (Primary)  -     CBC Auto Differential  -     Comprehensive Metabolic Panel  -     Lipid Panel  -     TSH    2. B12 deficiency  -     Vitamin B12    3. Vitamin D deficiency  -     Vitamin D 25 hydroxy    4. Elevated ferritin  -     Ferritin    5. Mixed anxiety depressive disorder  -     Ambulatory Referral to Psychiatry    6. Difficulty concentrating  -     Ambulatory Referral to Psychiatry    7. Obesity (BMI 30-39.9)  Assessment & Plan:  Patient's (Body mass index is 31.64 kg/m².) indicates that they are obese (BMI >30) with health conditions that include none . Weight is worsening. BMI  is above average; BMI management plan is completed. We discussed low calorie, low carb based diet program, portion control, increasing exercise, and pharmacologic options including Saxenda or Wegovy .  Patient declined medication at this time          Patient Instructions   Yoga With [place] on [facility] [employer]

## 2024-04-01 NOTE — ASSESSMENT & PLAN NOTE
Patient's (Body mass index is 31.64 kg/m².) indicates that they are obese (BMI >30) with health conditions that include none . Weight is worsening. BMI  is above average; BMI management plan is completed. We discussed low calorie, low carb based diet program, portion control, increasing exercise, and pharmacologic options including Saxenda or Wegovy .  Patient declined medication at this time

## 2024-04-08 ENCOUNTER — TELEPHONE (OUTPATIENT)
Dept: ONCOLOGY | Facility: CLINIC | Age: 39
End: 2024-04-08

## 2024-04-09 DIAGNOSIS — E66.9 OBESITY (BMI 30-39.9): Primary | ICD-10-CM

## 2024-04-15 ENCOUNTER — HOSPITAL ENCOUNTER (EMERGENCY)
Facility: HOSPITAL | Age: 39
Discharge: HOME | End: 2024-04-15
Admitting: EMERGENCY MEDICINE
Payer: OTHER GOVERNMENT

## 2024-04-15 ENCOUNTER — APPOINTMENT (OUTPATIENT)
Dept: CT IMAGING | Facility: HOSPITAL | Age: 39
End: 2024-04-15
Payer: OTHER GOVERNMENT

## 2024-04-15 VITALS
BODY MASS INDEX: 31.97 KG/M2 | RESPIRATION RATE: 18 BRPM | HEART RATE: 84 BPM | DIASTOLIC BLOOD PRESSURE: 70 MMHG | HEIGHT: 62 IN | WEIGHT: 173.72 LBS | SYSTOLIC BLOOD PRESSURE: 116 MMHG | OXYGEN SATURATION: 99 % | TEMPERATURE: 98 F

## 2024-04-15 DIAGNOSIS — R10.9 LEFT SIDED ABDOMINAL PAIN: Primary | ICD-10-CM

## 2024-04-15 DIAGNOSIS — F41.8 MIXED ANXIETY DEPRESSIVE DISORDER: ICD-10-CM

## 2024-04-15 DIAGNOSIS — R41.840 DIFFICULTY CONCENTRATING: ICD-10-CM

## 2024-04-15 LAB
ALBUMIN SERPL-MCNC: 4.8 G/DL (ref 3.5–5.2)
ALBUMIN/GLOB SERPL: 1.7 G/DL
ALP SERPL-CCNC: 63 U/L (ref 39–117)
ALT SERPL W P-5'-P-CCNC: 30 U/L (ref 1–33)
ANION GAP SERPL CALCULATED.3IONS-SCNC: 10 MMOL/L (ref 5–15)
AST SERPL-CCNC: 18 U/L (ref 1–32)
BASOPHILS # BLD AUTO: 0.03 10*3/MM3 (ref 0–0.2)
BASOPHILS NFR BLD AUTO: 0.4 % (ref 0–1.5)
BILIRUB SERPL-MCNC: 0.4 MG/DL (ref 0–1.2)
BILIRUB UR QL STRIP: NEGATIVE
BUN SERPL-MCNC: 10 MG/DL (ref 6–20)
BUN/CREAT SERPL: 13.2 (ref 7–25)
CALCIUM SPEC-SCNC: 9.9 MG/DL (ref 8.6–10.5)
CHLORIDE SERPL-SCNC: 102 MMOL/L (ref 98–107)
CLARITY UR: CLEAR
CO2 SERPL-SCNC: 28 MMOL/L (ref 22–29)
COLOR UR: YELLOW
CREAT SERPL-MCNC: 0.76 MG/DL (ref 0.57–1)
DEPRECATED RDW RBC AUTO: 39.7 FL (ref 37–54)
EGFRCR SERPLBLD CKD-EPI 2021: 103.6 ML/MIN/1.73
EOSINOPHIL # BLD AUTO: 0.03 10*3/MM3 (ref 0–0.4)
EOSINOPHIL NFR BLD AUTO: 0.4 % (ref 0.3–6.2)
ERYTHROCYTE [DISTWIDTH] IN BLOOD BY AUTOMATED COUNT: 11.5 % (ref 12.3–15.4)
GLOBULIN UR ELPH-MCNC: 2.9 GM/DL
GLUCOSE SERPL-MCNC: 106 MG/DL (ref 65–99)
GLUCOSE UR STRIP-MCNC: NEGATIVE MG/DL
HCT VFR BLD AUTO: 39.3 % (ref 34–46.6)
HGB BLD-MCNC: 13.6 G/DL (ref 12–15.9)
HGB UR QL STRIP.AUTO: NEGATIVE
IMM GRANULOCYTES # BLD AUTO: 0.01 10*3/MM3 (ref 0–0.05)
IMM GRANULOCYTES NFR BLD AUTO: 0.1 % (ref 0–0.5)
KETONES UR QL STRIP: NEGATIVE
LEUKOCYTE ESTERASE UR QL STRIP.AUTO: NEGATIVE
LIPASE SERPL-CCNC: 17 U/L (ref 13–60)
LYMPHOCYTES # BLD AUTO: 1.55 10*3/MM3 (ref 0.7–3.1)
LYMPHOCYTES NFR BLD AUTO: 19.9 % (ref 19.6–45.3)
MCH RBC QN AUTO: 32.9 PG (ref 26.6–33)
MCHC RBC AUTO-ENTMCNC: 34.6 G/DL (ref 31.5–35.7)
MCV RBC AUTO: 94.9 FL (ref 79–97)
MONOCYTES # BLD AUTO: 0.4 10*3/MM3 (ref 0.1–0.9)
MONOCYTES NFR BLD AUTO: 5.1 % (ref 5–12)
NEUTROPHILS NFR BLD AUTO: 5.75 10*3/MM3 (ref 1.7–7)
NEUTROPHILS NFR BLD AUTO: 74.1 % (ref 42.7–76)
NITRITE UR QL STRIP: NEGATIVE
NRBC BLD AUTO-RTO: 0 /100 WBC (ref 0–0.2)
PH UR STRIP.AUTO: 7.5 [PH] (ref 5–8)
PLATELET # BLD AUTO: 431 10*3/MM3 (ref 140–450)
PMV BLD AUTO: 8.1 FL (ref 6–12)
POTASSIUM SERPL-SCNC: 3.9 MMOL/L (ref 3.5–5.2)
PROT SERPL-MCNC: 7.7 G/DL (ref 6–8.5)
PROT UR QL STRIP: NEGATIVE
RBC # BLD AUTO: 4.14 10*6/MM3 (ref 3.77–5.28)
SODIUM SERPL-SCNC: 140 MMOL/L (ref 136–145)
SP GR UR STRIP: 1.01 (ref 1–1.03)
UROBILINOGEN UR QL STRIP: NORMAL
WBC NRBC COR # BLD AUTO: 7.77 10*3/MM3 (ref 3.4–10.8)

## 2024-04-15 PROCEDURE — 99285 EMERGENCY DEPT VISIT HI MDM: CPT

## 2024-04-15 PROCEDURE — 25010000002 ONDANSETRON PER 1 MG: Performed by: PHYSICIAN ASSISTANT

## 2024-04-15 PROCEDURE — 25510000001 IOPAMIDOL PER 1 ML: Performed by: PHYSICIAN ASSISTANT

## 2024-04-15 PROCEDURE — 74177 CT ABD & PELVIS W/CONTRAST: CPT

## 2024-04-15 PROCEDURE — 85025 COMPLETE CBC W/AUTO DIFF WBC: CPT | Performed by: PHYSICIAN ASSISTANT

## 2024-04-15 PROCEDURE — 96374 THER/PROPH/DIAG INJ IV PUSH: CPT

## 2024-04-15 PROCEDURE — 81003 URINALYSIS AUTO W/O SCOPE: CPT | Performed by: PHYSICIAN ASSISTANT

## 2024-04-15 PROCEDURE — 80053 COMPREHEN METABOLIC PANEL: CPT | Performed by: PHYSICIAN ASSISTANT

## 2024-04-15 PROCEDURE — 25810000003 SODIUM CHLORIDE 0.9 % SOLUTION: Performed by: PHYSICIAN ASSISTANT

## 2024-04-15 PROCEDURE — 83690 ASSAY OF LIPASE: CPT | Performed by: PHYSICIAN ASSISTANT

## 2024-04-15 RX ORDER — ONDANSETRON 2 MG/ML
4 INJECTION INTRAMUSCULAR; INTRAVENOUS ONCE
Status: COMPLETED | OUTPATIENT
Start: 2024-04-15 | End: 2024-04-15

## 2024-04-15 RX ORDER — SODIUM CHLORIDE 9 MG/ML
500 INJECTION, SOLUTION INTRAVENOUS CONTINUOUS
Status: DISCONTINUED | OUTPATIENT
Start: 2024-04-15 | End: 2024-04-15

## 2024-04-15 RX ORDER — ONDANSETRON 4 MG/1
4 TABLET, ORALLY DISINTEGRATING ORAL EVERY 8 HOURS PRN
Qty: 20 TABLET | Refills: 0 | Status: SHIPPED | OUTPATIENT
Start: 2024-04-15

## 2024-04-15 RX ORDER — SODIUM CHLORIDE 0.9 % (FLUSH) 0.9 %
10 SYRINGE (ML) INJECTION AS NEEDED
Status: DISCONTINUED | OUTPATIENT
Start: 2024-04-15 | End: 2024-04-15 | Stop reason: HOSPADM

## 2024-04-15 RX ORDER — DICYCLOMINE HCL 20 MG
20 TABLET ORAL EVERY 6 HOURS
Qty: 20 TABLET | Refills: 0 | Status: SHIPPED | OUTPATIENT
Start: 2024-04-15

## 2024-04-15 RX ADMIN — IOPAMIDOL 100 ML: 755 INJECTION, SOLUTION INTRAVENOUS at 11:26

## 2024-04-15 RX ADMIN — ONDANSETRON 4 MG: 2 INJECTION INTRAMUSCULAR; INTRAVENOUS at 10:28

## 2024-04-15 RX ADMIN — SODIUM CHLORIDE 500 ML: 9 INJECTION, SOLUTION INTRAVENOUS at 10:28

## 2024-04-15 NOTE — ED PROVIDER NOTES
Subjective   History of Present Illness  Patient is a 38-year-old female who presents with left flank pain worsening over the last several weeks and days.  She reports she is always had a sensitive stomach associated nausea.  She reports that seem to be worse over the last 2 to 3 days.  Associated with a bowel movement.  No diarrhea.  Has never seen a gastroenterologist.        Review of Systems   Constitutional:  Negative for chills, diaphoresis, fatigue and fever.   HENT:  Negative for congestion, ear pain, sinus pressure, sore throat, trouble swallowing and voice change.    Respiratory:  Negative for cough.    Cardiovascular:  Negative for chest pain and palpitations.   Gastrointestinal:  Positive for abdominal pain and nausea. Negative for abdominal distention, constipation and vomiting.   Genitourinary: Negative.    Musculoskeletal: Negative.    Skin: Negative.    Neurological: Negative.    Psychiatric/Behavioral: Negative.         Past Medical History:   Diagnosis Date    B12 deficiency     Low back pain     Obesity     Sleep apnea     Thrombocytosis     Vitamin D deficiency        No Known Allergies    Past Surgical History:   Procedure Laterality Date     SECTION      CYSTOSCOPY      EYE SURGERY      GLAUCOMA SURGERY Right     2020    HYSTERECTOMY      RETINAL DETACHMENT SURGERY  2020       Family History   Problem Relation Age of Onset    Alcohol abuse Father     Arthritis Father     Diabetes Father     Hyperlipidemia Father     Anxiety disorder Mother        Social History     Socioeconomic History    Marital status:    Tobacco Use    Smoking status: Never     Passive exposure: Never    Smokeless tobacco: Never   Vaping Use    Vaping status: Never Used   Substance and Sexual Activity    Alcohol use: No    Drug use: No    Sexual activity: Yes     Partners: Male     Birth control/protection: None           Objective   Physical Exam  Vitals and nursing note reviewed.   Constitutional:      "  General: She is not in acute distress.     Appearance: She is well-developed. She is not ill-appearing, toxic-appearing or diaphoretic.   HENT:      Head: Normocephalic and atraumatic.      Nose: Nose normal.   Eyes:      Pupils: Pupils are equal, round, and reactive to light.   Pulmonary:      Effort: Pulmonary effort is normal.   Abdominal:      Tenderness:  in the left lower quadrant   Musculoskeletal:         General: Normal range of motion.      Cervical back: Normal range of motion and neck supple.   Skin:     General: Skin is warm and dry.   Neurological:      General: No focal deficit present.      Mental Status: She is alert and oriented to person, place, and time.   Psychiatric:         Mood and Affect: Mood normal.         Behavior: Behavior normal.         Thought Content: Thought content normal.         Judgment: Judgment normal.         Procedures           ED Course  ED Course as of 04/15/24 1326   Mon Apr 15, 2024   1015 Due to significant overcrowding in the emergency department patient was evaluated by myself in a hallway bed. This exam may be limited by privacy, noise level and the patient not wearing a hospital gown.  Explained to the patient our limitations and our overcrowding.  They were in agreement to continue the exam and treatment at this time.    [MG]      ED Course User Index  [MG] Ashley Dobbins PA-C                                             Medical Decision Making  Appropriate PPE worn during exam.    /70   Pulse 84   Temp 98 °F (36.7 °C)   Resp 18   Ht 157.5 cm (62\")   Wt 78.8 kg (173 lb 11.6 oz)   LMP  (LMP Unknown)   SpO2 99%   BMI 31.77 kg/m²      Co-morbidities --  has a past medical history of B12 deficiency, Low back pain, Obesity, Sleep apnea, Thrombocytosis, and Vitamin D deficiency.  Radiology interpretation --  X-rays reviewed by me and independently interpreted by radiologist:  CT Abdomen Pelvis With Contrast    Result Date: 4/15/2024  Impression: 1.No " acute process identified. Electronically Signed: Bolivar Clinton MD  4/15/2024 11:31 AM EDT  Workstation ID: [id number]    Patient's blood work and imaging was fairly unremarkable.  No stool samples were able to be given while here I did discuss with her giving an outpatient stool sample to her PCP.  Referral was placed for GI doctor stable at time of discharge she can trial Bentyl at home return precaution follow-up in showings provided she was in agreement plan.  I discussed the findings and recommendations with the patient who voices understanding. Stable while in the ER.     Note Disclaimer: At [facility], we believe that sharing information builds trust and better relationships. You are receiving this note because you are receiving care at [facility] or recently visited. It is possible you will see health information before a provider has talked with you about it. This kind of information can be easy to misunderstand. To help you fully understand what it means for your health, we urge you to discuss this note with your provider.        Problems Addressed:  Left sided abdominal pain: complicated acute illness or injury    Amount and/or Complexity of Data Reviewed  Labs: ordered.  Radiology: ordered.    Risk  Prescription drug management.        Final diagnoses:   Left sided abdominal pain       ED Disposition  ED Disposition       ED Disposition   Discharge    Condition   Stable    Comment   --               Cheryl Vallejo, APRN  [address] [address]  [phone]    Schedule an appointment as soon as possible for a visit   As needed, If symptoms worsen    [facility]  [address] [address] [phone]  Schedule an appointment as soon as possible for a visit            Medication List        New Prescriptions      dicyclomine 20 MG tablet  Commonly known as: BENTYL  Take 1 tablet by mouth Every 6 (Six) Hours.     ondansetron ODT 4 MG  disintegrating tablet  Commonly known as: ZOFRAN-ODT  Place 1 tablet on the tongue Every 8 (Eight) Hours As Needed for Nausea.               Where to Get Your Medications        These medications were sent to [id number] - [place], [place] - [address] AT [facility] [place] [address] [facility] - [phone]  - [phone] FX  [address] [place] [address]      Phone: [phone]   dicyclomine 20 MG tablet  ondansetron ODT 4 MG disintegrating tablet            Ashley Dobbins PA-C  04/15/24 9399

## 2024-04-15 NOTE — DISCHARGE INSTRUCTIONS
Return to the ER for any worsening symptoms.  Follow-up with your primary care provider and gastroenterologist for further workup and management.

## 2024-04-17 ENCOUNTER — CLINICAL SUPPORT (OUTPATIENT)
Dept: FAMILY MEDICINE CLINIC | Facility: CLINIC | Age: 39
End: 2024-04-17
Payer: OTHER GOVERNMENT

## 2024-04-17 DIAGNOSIS — E66.9 OBESITY (BMI 30-39.9): ICD-10-CM

## 2024-04-17 DIAGNOSIS — E55.9 VITAMIN D DEFICIENCY: Primary | ICD-10-CM

## 2024-04-17 PROCEDURE — 83527 ASSAY OF INSULIN: CPT | Performed by: NURSE PRACTITIONER

## 2024-04-17 PROCEDURE — 83525 ASSAY OF INSULIN: CPT | Performed by: NURSE PRACTITIONER

## 2024-04-17 PROCEDURE — 36415 COLL VENOUS BLD VENIPUNCTURE: CPT | Performed by: NURSE PRACTITIONER

## 2024-04-17 RX ORDER — ERGOCALCIFEROL 1.25 MG/1
50000 CAPSULE ORAL WEEKLY
Qty: 5 CAPSULE | Refills: 11 | Status: SHIPPED | OUTPATIENT
Start: 2024-04-17

## 2024-04-17 NOTE — PROGRESS NOTES
Venipuncture performed on Right Arm by Nicole Mccarthy MA  with good hemostasis. Patient tolerated well. 04/17/24  TAMIE Cummins

## 2024-04-24 LAB
INSULIN FREE SERPL-ACNC: 8.1 UU/ML
INSULIN SERPL-ACNC: 8.1 UU/ML

## 2024-04-29 ENCOUNTER — OFFICE VISIT (OUTPATIENT)
Dept: PAIN MEDICINE | Facility: CLINIC | Age: 39
End: 2024-04-29
Payer: OTHER GOVERNMENT

## 2024-04-29 VITALS
OXYGEN SATURATION: 98 % | SYSTOLIC BLOOD PRESSURE: 107 MMHG | HEART RATE: 79 BPM | WEIGHT: 176.1 LBS | DIASTOLIC BLOOD PRESSURE: 65 MMHG | BODY MASS INDEX: 32.21 KG/M2 | RESPIRATION RATE: 16 BRPM

## 2024-04-29 DIAGNOSIS — R20.0 NUMBNESS AND TINGLING OF RIGHT SIDE OF FACE: ICD-10-CM

## 2024-04-29 DIAGNOSIS — R20.2 NUMBNESS AND TINGLING OF RIGHT SIDE OF FACE: ICD-10-CM

## 2024-04-29 DIAGNOSIS — M54.12 CERVICAL RADICULOPATHY: Primary | ICD-10-CM

## 2024-04-29 PROCEDURE — 99213 OFFICE O/P EST LOW 20 MIN: CPT | Performed by: STUDENT IN AN ORGANIZED HEALTH CARE EDUCATION/TRAINING PROGRAM

## 2024-04-29 RX ORDER — GUANFACINE 1 MG/1
1 TABLET ORAL NIGHTLY
COMMUNITY

## 2024-04-29 NOTE — PROGRESS NOTES
CHIEF COMPLAINT  Chief Complaint   Patient presents with    Neck Pain     No opioid       Primary Care  Cheryl Vallejo, APRN    Subjective   Chrystal Walters is a 38 y.o. female  who presents for chronic neck pain and cervical radiculopathy.  She reports several months of significant neck pain and RUE radiculopathy, especially into the 1st and 2nd fingers.  She has no inciting event.  The pain has somewhat improved over the past month, but still provides significant discomfort, especially when turning her head and playing with her children.  She denies any myelopathic symptoms.  She does have an MRI which shows C5/6, C6/7 disc herniation which correlates with her symptoms.    History of Present Illness     Location: Neck with radiation into the RUE  Onset: Several months ago  Duration: Slightly improved  Timing: Constant throughout the day  Quality: Shooting, electric  Severity: Today: 1       Last Week: 5       Worst: 7  Modifying Factors: The pain is worse with activity and neck movement  Functional Deficit: The pain makes ADLs difficult    Physical Therapy: yes    Interval Update 04/29/2024: Overall, doing much better.  She has pretty much had a resolution of her numbness and tingling.  Her myofascial pain is improving.  Her PCP recently started her on vitamin D supplementation and she reports this is significantly helped her neck and musculoskeletal pain.  No longer on gabapentin or Robaxin.    The following portions of the patient's history were reviewed and updated as appropriate: allergies, current medications, past family history, past medical history, past social history, past surgical history, and problem list.    Procedures:  12/27/2023: EDENILSON: 90% benefit      Current Outpatient Medications:     acetaminophen (TYLENOL) 500 MG tablet, Take 1 tablet by mouth Every 6 (Six) Hours As Needed for Mild Pain., Disp: , Rfl:     dicyclomine (BENTYL) 20 MG tablet, Take 1 tablet by mouth Every 6 (Six) Hours., Disp: 20  tablet, Rfl: 0    guanFACINE (TENEX) 1 MG tablet, Take 1 tablet by mouth Every Night., Disp: , Rfl:     ondansetron ODT (ZOFRAN-ODT) 4 MG disintegrating tablet, Place 1 tablet on the tongue Every 8 (Eight) Hours As Needed for Nausea., Disp: 20 tablet, Rfl: 0    vitamin D (ERGOCALCIFEROL) 1.25 MG (59794 UT) capsule capsule, Take 1 capsule by mouth 1 (One) Time Per Week., Disp: 5 capsule, Rfl: 11    Review of Systems   Musculoskeletal:  Positive for myalgias, neck pain and neck stiffness. Negative for arthralgias, back pain and gait problem.   Neurological:  Positive for numbness. Negative for weakness.       Vitals:    04/29/24 1009   BP: 107/65   Pulse: 79   Resp: 16   SpO2: 98%   Weight: 79.9 kg (176 lb 1.6 oz)   PainSc:   1       Urine Drug Screen: Pending  Appropriate: Pending    Objective   Physical Exam  Vitals and nursing note reviewed. Exam conducted with a chaperone present.   Constitutional:       General: She is not in acute distress.     Appearance: Normal appearance. She is normal weight.   Musculoskeletal:      Cervical back: Spasms and tenderness present. No swelling, signs of trauma, rigidity, bony tenderness or crepitus. Pain with movement present. Decreased range of motion.   Neurological:      Mental Status: She is alert.      Comments: Decreased sensation over the 1st two fingers on the RUE compared to the left.  Otherwise, sensation and strength intact         Assessment & Plan   Problems Addressed this Visit    None  Visit Diagnoses       Cervical radiculopathy    -  Primary    Numbness and tingling of right side of face              Diagnoses         Codes Comments    Cervical radiculopathy    -  Primary ICD-10-CM: M54.12  ICD-9-CM: 723.4     Numbness and tingling of right side of face     ICD-10-CM: R20.0, R20.2  ICD-9-CM: 782.0             Plan:  Overall, mostly resolved  No longer on Robaxin or gabapentin  --- Follow-up as needed           INSPECT REPORT    As part of the patient's treatment  plan, I may be prescribing controlled substances. The patient has been made aware of appropriate use of such medications, including potential risk of somnolence, limited ability to drive and/or work safely, and the potential for dependence or overdose. It has also bee made clear that these medications are for use by this patient only, without concomitant use of alcohol or other substances unless prescribed.     Patient has completed prescribing agreement detailing terms of continued prescribing of controlled substances, including monitoring SHER reports, urine drug screening, and pill counts if necessary. The patient is aware that inappropriate use will results in cessation of prescribing such medications.    INSPECT report has been reviewed and scanned into the patient's chart.    As the clinician, I personally reviewed the INSPECT from 4/26/2024.    History and physical exam exhibit continued safe and appropriate use of controlled substances.      EMR Dragon/Transcription disclaimer:   Much of this encounter note is an electronic transcription/translation of spoken language to printed text. The electronic translation of spoken language may permit erroneous, or at times, nonsensical words or phrases to be inadvertently transcribed; Although I have reviewed the note for such errors, some may still exist.

## 2024-05-07 ENCOUNTER — OFFICE VISIT (OUTPATIENT)
Dept: ONCOLOGY | Facility: CLINIC | Age: 39
End: 2024-05-07
Payer: OTHER GOVERNMENT

## 2024-05-07 VITALS
HEART RATE: 93 BPM | OXYGEN SATURATION: 98 % | HEIGHT: 62 IN | WEIGHT: 174 LBS | TEMPERATURE: 97.5 F | BODY MASS INDEX: 32.02 KG/M2

## 2024-05-07 DIAGNOSIS — R79.89 OTHER SPECIFIED ABNORMAL FINDINGS OF BLOOD CHEMISTRY: Primary | ICD-10-CM

## 2024-05-07 PROCEDURE — 99213 OFFICE O/P EST LOW 20 MIN: CPT | Performed by: INTERNAL MEDICINE

## 2024-05-09 LAB
FERRITIN SERPL-MCNC: 229 NG/ML (ref 15–150)
IRON SATN MFR SERPL: 13 % (ref 15–55)
IRON SERPL-MCNC: 45 UG/DL (ref 27–159)
STFR SERPL-SCNC: 17.1 NMOL/L (ref 12.2–27.3)
TIBC SERPL-MCNC: 336 UG/DL (ref 250–450)
UIBC SERPL-MCNC: 291 UG/DL (ref 131–425)

## 2024-05-28 NOTE — PROGRESS NOTES
Hematology/Oncology Outpatient Follow Up    PATIENT NAME:Chrystal Walters  :1985  MRN: [id number]  PRIMARY CARE PHYSICIAN: Cheryl Vallejo APRN  REFERRING PHYSICIAN: Cheryl Vallejo APRN    Chief Complaint   Patient presents with    Follow-up     Other specified abnormal findings of blood chemistry       HISTORY OF PRESENT ILLNESS:     Ms. Walters is a 34-year-old female who presents to our office on 19 for consultation regarding thrombocytosis.  She had a CBC on 10/1/18 that showed a platelet count of 487,000.  She is consulted for thrombocytosis.    10/1/18 - WBC 6.3, hemoglobin 12.9, platelet count 487,000, MCV 92.8.  Magnesium 1.8.  B12 211.  TSH 1.37.  ESR 48 (H).  Vitamin D 22(L).  Creatinine 0.7, BUN 11.  LFTs are normal.    18 - WBC 5.2, hemoglobin 13.1, platelet count 471,000, MCV 94.4.      2019 - The patient presents for initial consultation.  She denies any history of strokes.  She denies any fevers, chills, night sweats, weight loss or lymph node swelling.  She denies any history of thrombosis.  She does report slight excess bruising.  She denies any personal or family history of lupus or any connective tissue disorder.  She denies any recent infection.  Patient denies any bleeding per rectum.  She does not have any menstrual cycles since her hysterectomy.  19 - WBC 8.7, hemoglobin 12.6, platelet count 488,000, MCV 93.1.    19 - B12 259.  CRP 0.41.  Ferritin 108.  Folate 20.6.  Iron saturation 14% (L), TIBC 338, serum iron 49.  ESR 41 (H).  Antiparietal cell antibodies negative.  Intrinsic factor antibodies negative.  Peripheral blood smear:  Thrombocytosis.  No immature cells noted.  19 - WBC 9.3, hemoglobin 12.8, platelet count 462,000, MCV 94.5.    [facility] - [facility]:  Normal.  No mutations detected.    19 - WBC 8.3, hemoglobin 13.6, platelet count 453,000, MCV 92.8.  Neutrophils 6590.  Creatinine 0.7, BUN 12.  LFTs  are normal.    2/17/19 - Chest x-ray:  Negative.   February 2020 Obi 2 was negative  4/27/2020 patient had a ferritin level which was 195, TIBC was normal at 374, iron saturation was low at 13%, white count was 9.7, hemoglobin 12.9 and platelets were 508 differentials were 58% neutrophils, 32% lymphocytes, there was no monocytosis, eosinophilia or basophilia.  Sed rate was elevated to 48  5/1/2020-patient had a uric acid level which was normal, LDH was normal, soluble transferrin receptor assay was normal and BCR ABL was negative.  Since her last visit patient has suffered retinal detachment and is scheduled for surgery 5/15/2020  Ultrasound of the abdomen was recommended to evaluate for possible spleen enlargement.  Patient has not had this completed.  She is not ready to have additional work-up.  5/7/2024: In the office after a long absence.  Was seen by primary care who obtained a ferritin measurement.  This was reported to be elevated.  At some point she also had been noted to have thrombocytosis again but on the previous blood count her platelet count was within the normal range.  Feels reasonably well.  Has been working full-time and without new limitations.  Eats well and has not had any weight loss.  No chest pains or abdominal pain.  On exam in no distress.  No jaundice.  Lungs are clear bilaterally and heart regular.  Abdomen soft.  There is no edema.  Laboratory exams indeed reveal elevated ferritin, however, this has been the case for a long time and the trend is not progressive.  On this basis it is unlikely that it is the result of iron accumulation but will investigate further.  Reviewed the other laboratory exams and discussed with her.  She is to have blood drawn today and will see me with results in a few weeks.  6/4/2024: Feels reasonably well and has had no new symptoms.  Eats well and has had no weight loss.  No fevers.  No pain.  Denies dyspnea and has had no changes in bowel activity.  No  melena or hematochezia.  No dysuria.  On exam alert, conversant and in good spirits.  No jaundice or pallor.  She is well-hydrated.  The lungs are clear and the heart regular.  Abdomen protuberant but soft.  Liver and spleen not enlarged.  No edema.  The laboratory exams were reviewed and discussed with her.  Her total iron binding capacity is in the midportion of the normal range at 336 mcg/dL.  The iron saturation is diminished slightly at 13, with a normal iron determination at 45 mcg/dL.  The soluble transferrin receptor is well within the normal range at 17.1 nmol/L.  The ferritin again is somewhat elevated but not any higher than it was the last time.  On the basis of this I believe that continued observation is appropriate.  I have asked her to return to see me in 2 months with new laboratory exams.    Past Medical History:   Diagnosis Date    B12 deficiency     Low back pain     Obesity     Sleep apnea     Thrombocytosis     Vitamin D deficiency      Past Surgical History:   Procedure Laterality Date     SECTION      CYSTOSCOPY      EYE SURGERY      GLAUCOMA SURGERY Right     2020    HYSTERECTOMY      RETINAL DETACHMENT SURGERY  2020       Current Outpatient Medications:     acetaminophen (TYLENOL) 500 MG tablet, Take 1 tablet by mouth Every 6 (Six) Hours As Needed for Mild Pain., Disp: , Rfl:     guanFACINE (TENEX) 1 MG tablet, Take 1 tablet by mouth Every Night., Disp: , Rfl:     vitamin D (ERGOCALCIFEROL) 1.25 MG (68899 UT) capsule capsule, Take 1 capsule by mouth 1 (One) Time Per Week., Disp: 5 capsule, Rfl: 11    No Known Allergies    Family History   Problem Relation Age of Onset    Alcohol abuse Father     Arthritis Father     Diabetes Father     Hyperlipidemia Father     Anxiety disorder Mother        Cancer-related family history is not on file.    Social History     Tobacco Use    Smoking status: Never     Passive exposure: Never    Smokeless tobacco: Never   Vaping Use    Vaping  "status: Never Used   Substance Use Topics    Alcohol use: No    Drug use: No     I have reviewed and confirmed the accuracy of the patient's history: Chief complaint, HPI, ROS and Subjective as entered by the MA/LPN/RN. Wilder Reed MD 06/04/24     REVIEW OF SYSTEMS:    Review of Systems   Constitutional: Negative for chills and fever.   HENT: Negative for ear pain, mouth sores, nosebleeds and sore throat.    Eyes:. Negative for photophobia.    Respiratory: Negative for wheezing and stridor.    Cardiovascular: Negative for chest pain and palpitations.   Gastrointestinal: Negative for abdominal pain, diarrhea, nausea and vomiting.   Endocrine: Negative for cold intolerance and heat intolerance.   Genitourinary: Negative for dysuria and hematuria.   Musculoskeletal: Positive for arthralgias. Negative for joint swelling and neck stiffness.   Skin: Negative for color change and rash.   Neurological: Negative for seizures and syncope.   Hematological: Negative for adenopathy.   Psychiatric/Behavioral: Negative for agitation, confusion and hallucinations.     OBJECTIVE:    Vitals:    06/04/24 1523   Pulse: 84   Temp: 98.2 °F (36.8 °C)   SpO2: 99%   Weight: 81.6 kg (180 lb)   Height: 157.5 cm (62\")   PainSc: 0-No pain     Body mass index is 32.92 kg/m².    ECOG  (0) Fully active, able to carry on all predisease performance without restriction    Physical Exam   Constitutional: She is oriented to person, place, and time. She appears well-developed and well-nourished.   HENT:   Head: Normocephalic.   Right Ear: External ear normal.   Left Ear: External ear normal.   Nose: Nose normal.   Eyes:   Neck: Normal range of motion.   Pulmonary/Chest: Effort normal.   Musculoskeletal: Normal range of motion.   Neurological: She is alert and oriented to person, place, and time.   Psychiatric: She has a normal mood and affect. Her behavior is normal.     I have reexamined the patient and the results are consistent with the " previously documented exam. Wilder Reed MD     RECENT LABS  WBC   Date Value Ref Range Status   04/15/2024 7.77 3.40 - 10.80 10*3/mm3 Final     RBC   Date Value Ref Range Status   04/15/2024 4.14 3.77 - 5.28 10*6/mm3 Final     Hemoglobin   Date Value Ref Range Status   04/15/2024 13.6 12.0 - 15.9 g/dL Final     Hematocrit   Date Value Ref Range Status   04/15/2024 39.3 34.0 - 46.6 % Final     MCV   Date Value Ref Range Status   04/15/2024 94.9 79.0 - 97.0 fL Final     MCH   Date Value Ref Range Status   04/15/2024 32.9 26.6 - 33.0 pg Final     MCHC   Date Value Ref Range Status   04/15/2024 34.6 31.5 - 35.7 g/dL Final     RDW   Date Value Ref Range Status   04/15/2024 11.5 (L) 12.3 - 15.4 % Final     RDW-SD   Date Value Ref Range Status   04/15/2024 39.7 37.0 - 54.0 fl Final     MPV   Date Value Ref Range Status   04/15/2024 8.1 6.0 - 12.0 fL Final     Platelets   Date Value Ref Range Status   04/15/2024 431 140 - 450 10*3/mm3 Final     Neutrophil %   Date Value Ref Range Status   04/15/2024 74.1 42.7 - 76.0 % Final     Lymphocyte %   Date Value Ref Range Status   04/15/2024 19.9 19.6 - 45.3 % Final     Monocyte %   Date Value Ref Range Status   04/15/2024 5.1 5.0 - 12.0 % Final     Eosinophil %   Date Value Ref Range Status   04/15/2024 0.4 0.3 - 6.2 % Final     Basophil %   Date Value Ref Range Status   04/15/2024 0.4 0.0 - 1.5 % Final     Immature Grans %   Date Value Ref Range Status   04/15/2024 0.1 0.0 - 0.5 % Final     Neutrophils, Absolute   Date Value Ref Range Status   04/15/2024 5.75 1.70 - 7.00 10*3/mm3 Final     Lymphocytes, Absolute   Date Value Ref Range Status   04/15/2024 1.55 0.70 - 3.10 10*3/mm3 Final     Monocytes, Absolute   Date Value Ref Range Status   04/15/2024 0.40 0.10 - 0.90 10*3/mm3 Final     Eosinophils, Absolute   Date Value Ref Range Status   04/15/2024 0.03 0.00 - 0.40 10*3/mm3 Final     Basophils, Absolute   Date Value Ref Range Status   04/15/2024 0.03 0.00 - 0.20 10*3/mm3  Final     Immature Grans, Absolute   Date Value Ref Range Status   04/15/2024 0.01 0.00 - 0.05 10*3/mm3 Final     nRBC   Date Value Ref Range Status   04/15/2024 0.0 0.0 - 0.2 /100 WBC Final       Lab Results   Component Value Date    GLUCOSE 106 (H) 04/15/2024    BUN 10 04/15/2024    CREATININE 0.76 04/15/2024    EGFRIFNONA 104 09/03/2021    BCR 13.2 04/15/2024    K 3.9 04/15/2024    CO2 28.0 04/15/2024    CALCIUM 9.9 04/15/2024    ALBUMIN 4.8 04/15/2024    LABIL2 1.3 05/29/2019    AST 18 04/15/2024    ALT 30 04/15/2024     ASSESSMENT:    Thrombocytosis: Resolved at the time of the last blood check.  She is to have another blood count and see me with the results.  Vitamin B12 deficiency: No suggestion of it at this time.  Mild iron deficiency: Current laboratory exams do not suggest iron deficiency and the ferritin is likely reflection of inflammation rather than iron accumulation.  Will recheck.  Reviewed the blood counts, chemistries, iron studies, soluble transferrin receptor and ferritin.  See me in approximately 2 months with new laboratory exams.    PLAN:     As above.    Wilder Reed MD on 6/4/2024 at 1553..

## 2024-06-04 ENCOUNTER — OFFICE VISIT (OUTPATIENT)
Dept: ONCOLOGY | Facility: CLINIC | Age: 39
End: 2024-06-04
Payer: OTHER GOVERNMENT

## 2024-06-04 VITALS
OXYGEN SATURATION: 99 % | WEIGHT: 180 LBS | BODY MASS INDEX: 33.13 KG/M2 | HEIGHT: 62 IN | TEMPERATURE: 98.2 F | HEART RATE: 84 BPM

## 2024-06-04 DIAGNOSIS — R79.89 OTHER SPECIFIED ABNORMAL FINDINGS OF BLOOD CHEMISTRY: Primary | ICD-10-CM

## 2024-06-04 PROCEDURE — 99213 OFFICE O/P EST LOW 20 MIN: CPT | Performed by: INTERNAL MEDICINE

## 2024-06-24 ENCOUNTER — TELEPHONE (OUTPATIENT)
Dept: FAMILY MEDICINE CLINIC | Facility: CLINIC | Age: 39
End: 2024-06-24
Payer: OTHER GOVERNMENT

## 2024-06-24 DIAGNOSIS — H33.20 RETINAL DETACHMENT, UNSPECIFIED LATERALITY: ICD-10-CM

## 2024-06-24 DIAGNOSIS — H27.01 APHAKIA OF RIGHT EYE: Primary | ICD-10-CM

## 2024-06-24 NOTE — TELEPHONE ENCOUNTER
Ally [facility] [place]     Opthmalogy at [facility] needing new referral since previous  Retired.

## 2024-07-22 DIAGNOSIS — F41.8 MIXED ANXIETY DEPRESSIVE DISORDER: Primary | ICD-10-CM

## 2024-07-31 LAB
ALBUMIN SERPL-MCNC: 4.5 G/DL (ref 3.9–4.9)
ALP SERPL-CCNC: 64 IU/L (ref 44–121)
ALT SERPL-CCNC: 21 IU/L (ref 0–32)
AST SERPL-CCNC: 18 IU/L (ref 0–40)
BASOPHILS # BLD AUTO: 0.1 X10E3/UL (ref 0–0.2)
BASOPHILS NFR BLD AUTO: 1 %
BILIRUB SERPL-MCNC: 0.3 MG/DL (ref 0–1.2)
BUN SERPL-MCNC: 16 MG/DL (ref 6–20)
BUN/CREAT SERPL: 21 (ref 9–23)
CALCIUM SERPL-MCNC: 9.4 MG/DL (ref 8.7–10.2)
CHLORIDE SERPL-SCNC: 103 MMOL/L (ref 96–106)
CO2 SERPL-SCNC: 23 MMOL/L (ref 20–29)
CREAT SERPL-MCNC: 0.76 MG/DL (ref 0.57–1)
EGFRCR SERPLBLD CKD-EPI 2021: 103 ML/MIN/1.73
EOSINOPHIL # BLD AUTO: 0 X10E3/UL (ref 0–0.4)
EOSINOPHIL NFR BLD AUTO: 1 %
ERYTHROCYTE [DISTWIDTH] IN BLOOD BY AUTOMATED COUNT: 11.9 % (ref 11.7–15.4)
FERRITIN SERPL-MCNC: 230 NG/ML (ref 15–150)
GLOBULIN SER CALC-MCNC: 2.2 G/DL (ref 1.5–4.5)
GLUCOSE SERPL-MCNC: 111 MG/DL (ref 70–99)
HCT VFR BLD AUTO: 36 % (ref 34–46.6)
HGB BLD-MCNC: 11.9 G/DL (ref 11.1–15.9)
IMM GRANULOCYTES # BLD AUTO: 0 X10E3/UL (ref 0–0.1)
IMM GRANULOCYTES NFR BLD AUTO: 0 %
IRON SATN MFR SERPL: 29 % (ref 15–55)
IRON SERPL-MCNC: 84 UG/DL (ref 27–159)
LYMPHOCYTES # BLD AUTO: 2.1 X10E3/UL (ref 0.7–3.1)
LYMPHOCYTES NFR BLD AUTO: 32 %
MCH RBC QN AUTO: 32 PG (ref 26.6–33)
MCHC RBC AUTO-ENTMCNC: 33.1 G/DL (ref 31.5–35.7)
MCV RBC AUTO: 97 FL (ref 79–97)
MONOCYTES # BLD AUTO: 0.4 X10E3/UL (ref 0.1–0.9)
MONOCYTES NFR BLD AUTO: 6 %
NEUTROPHILS # BLD AUTO: 3.9 X10E3/UL (ref 1.4–7)
NEUTROPHILS NFR BLD AUTO: 60 %
PLATELET # BLD AUTO: 430 X10E3/UL (ref 150–450)
POTASSIUM SERPL-SCNC: 3.9 MMOL/L (ref 3.5–5.2)
PROT SERPL-MCNC: 6.7 G/DL (ref 6–8.5)
RBC # BLD AUTO: 3.72 X10E6/UL (ref 3.77–5.28)
SODIUM SERPL-SCNC: 138 MMOL/L (ref 134–144)
TIBC SERPL-MCNC: 285 UG/DL (ref 250–450)
UIBC SERPL-MCNC: 201 UG/DL (ref 131–425)
WBC # BLD AUTO: 6.4 X10E3/UL (ref 3.4–10.8)

## 2024-08-02 NOTE — PROGRESS NOTES
Hematology/Oncology Outpatient Follow Up    PATIENT NAME:Chrystal Walters  :1985  MRN: [id number]  PRIMARY CARE PHYSICIAN: Cheryl Vallejo APRN  REFERRING PHYSICIAN: Cheryl Vallejo APRN    Chief Complaint   Patient presents with    Follow-up     Other specified abnormal findings of blood chemistry       HISTORY OF PRESENT ILLNESS:     Ms. Walters is a 34-year-old female who presents to our office on 19 for consultation regarding thrombocytosis.  She had a CBC on 10/1/18 that showed a platelet count of 487,000.  She is consulted for thrombocytosis.    10/1/18 - WBC 6.3, hemoglobin 12.9, platelet count 487,000, MCV 92.8.  Magnesium 1.8.  B12 211.  TSH 1.37.  ESR 48 (H).  Vitamin D 22(L).  Creatinine 0.7, BUN 11.  LFTs are normal.    18 - WBC 5.2, hemoglobin 13.1, platelet count 471,000, MCV 94.4.      2019 - The patient presents for initial consultation.  She denies any history of strokes.  She denies any fevers, chills, night sweats, weight loss or lymph node swelling.  She denies any history of thrombosis.  She does report slight excess bruising.  She denies any personal or family history of lupus or any connective tissue disorder.  She denies any recent infection.  Patient denies any bleeding per rectum.  She does not have any menstrual cycles since her hysterectomy.  19 - WBC 8.7, hemoglobin 12.6, platelet count 488,000, MCV 93.1.    19 - B12 259.  CRP 0.41.  Ferritin 108.  Folate 20.6.  Iron saturation 14% (L), TIBC 338, serum iron 49.  ESR 41 (H).  Antiparietal cell antibodies negative.  Intrinsic factor antibodies negative.  Peripheral blood smear:  Thrombocytosis.  No immature cells noted.  19 - WBC 9.3, hemoglobin 12.8, platelet count 462,000, MCV 94.5.    19 - [facility] by N[facility]:  Normal.  No mutations detected.    19 - WBC 8.3, hemoglobin 13.6, platelet count 453,000, MCV 92.8.  Neutrophils 6590.  Creatinine 0.7, BUN 12.  LFTs  are normal.    2/17/19 - Chest x-ray:  Negative.   February 2020 Obi 2 was negative  4/27/2020 patient had a ferritin level which was 195, TIBC was normal at 374, iron saturation was low at 13%, white count was 9.7, hemoglobin 12.9 and platelets were 508 differentials were 58% neutrophils, 32% lymphocytes, there was no monocytosis, eosinophilia or basophilia.  Sed rate was elevated to 48  5/1/2020-patient had a uric acid level which was normal, LDH was normal, soluble transferrin receptor assay was normal and BCR ABL was negative.  Since her last visit patient has suffered retinal detachment and is scheduled for surgery 5/15/2020  Ultrasound of the abdomen was recommended to evaluate for possible spleen enlargement.  Patient has not had this completed.  She is not ready to have additional work-up.  5/7/2024: In the office after a long absence.  Was seen by primary care who obtained a ferritin measurement.  This was reported to be elevated.  At some point she also had been noted to have thrombocytosis again but on the previous blood count her platelet count was within the normal range.  Feels reasonably well.  Has been working full-time and without new limitations.  Eats well and has not had any weight loss.  No chest pains or abdominal pain.  On exam in no distress.  No jaundice.  Lungs are clear bilaterally and heart regular.  Abdomen soft.  There is no edema.  Laboratory exams indeed reveal elevated ferritin, however, this has been the case for a long time and the trend is not progressive.  On this basis it is unlikely that it is the result of iron accumulation but will investigate further.  Reviewed the other laboratory exams and discussed with her.  She is to have blood drawn today and will see me with results in a few weeks.  6/4/2024: Feels reasonably well and has had no new symptoms.  Eats well and has had no weight loss.  No fevers.  No pain.  Denies dyspnea and has had no changes in bowel activity.  No  melena or hematochezia.  No dysuria.  On exam alert, conversant and in good spirits.  No jaundice or pallor.  She is well-hydrated.  The lungs are clear and the heart regular.  Abdomen protuberant but soft.  Liver and spleen not enlarged.  No edema.  The laboratory exams were reviewed and discussed with her.  Her total iron binding capacity is in the midportion of the normal range at 336 mcg/dL.  The iron saturation is diminished slightly at 13, with a normal iron determination at 45 mcg/dL.  The soluble transferrin receptor is well within the normal range at 17.1 nmol/L.  The ferritin again is somewhat elevated but not any higher than it was the last time.  On the basis of this I believe that continued observation is appropriate.  I have asked her to return to see me in 2 months with new laboratory exams.  2024: Entirely asymptomatic.  Active and without new limitations.  Working full-time.  Eating with good appetite.  No nausea or vomiting.  No chest pains or cough.  No abdominal pain or diarrhea.  No dysuria.  On exam alert, conversant and in no distress.  Oriented.  Lungs clear and heart regular.  Laboratory exams reviewed and discussed with her.  No need for intervention at this time.  She is going to see me in a year or sooner if needed.    Past Medical History:   Diagnosis Date    B12 deficiency     Low back pain     Obesity     Sleep apnea     Thrombocytosis     Vitamin D deficiency      Past Surgical History:   Procedure Laterality Date     SECTION      CYSTOSCOPY      EYE SURGERY      GLAUCOMA SURGERY Right     2020    HYSTERECTOMY      RETINAL DETACHMENT SURGERY  2020       Current Outpatient Medications:     acetaminophen (TYLENOL) 500 MG tablet, Take 1 tablet by mouth Every 6 (Six) Hours As Needed for Mild Pain., Disp: , Rfl:     Combigan 0.2-0.5 % ophthalmic solution, Administer 1 drop to both eyes 2 (Two) Times a Day., Disp: , Rfl:     lisdexamfetamine dimesylate (VYVANSE) 10 MG  "capsule, Take 1 capsule by mouth Daily, Disp: , Rfl:     Miebo 1.338 GM/ML solution, igtt OU QID, Disp: , Rfl:     vitamin D (ERGOCALCIFEROL) 1.25 MG (00685 UT) capsule capsule, Take 1 capsule by mouth 1 (One) Time Per Week., Disp: 5 capsule, Rfl: 11    No Known Allergies    Family History   Problem Relation Age of Onset    Alcohol abuse Father     Arthritis Father     Diabetes Father     Hyperlipidemia Father     Anxiety disorder Mother        Cancer-related family history is not on file.    Social History     Tobacco Use    Smoking status: Never     Passive exposure: Never    Smokeless tobacco: Never   Vaping Use    Vaping status: Never Used   Substance Use Topics    Alcohol use: No    Drug use: No     I have reviewed and confirmed the accuracy of the patient's history: Chief complaint, HPI, ROS and Subjective as entered by the MA/LPN/RN. Wilder Reed MD 08/06/24     REVIEW OF SYSTEMS:    Review of Systems   Constitutional: Negative for chills and fever.   HENT: Negative for ear pain, mouth sores, nosebleeds and sore throat.    Eyes:. Negative for photophobia.    Respiratory: Negative for wheezing and stridor.    Cardiovascular: Negative for chest pain and palpitations.   Gastrointestinal: Negative for abdominal pain, diarrhea, nausea and vomiting.   Endocrine: Negative for cold intolerance and heat intolerance.   Genitourinary: Negative for dysuria and hematuria.   Musculoskeletal: Positive for arthralgias. Negative for joint swelling and neck stiffness.   Skin: Negative for color change and rash.   Neurological: Negative for seizures and syncope.   Hematological: Negative for adenopathy.   Psychiatric/Behavioral: Negative for agitation, confusion and hallucinations.     OBJECTIVE:    Vitals:    08/06/24 1520   Pulse: 73   Temp: 97.7 °F (36.5 °C)   SpO2: 97%   Weight: 79.4 kg (175 lb)   Height: 157.5 cm (62\")   PainSc: 0-No pain       Body mass index is 32.01 kg/m².    ECOG  (0) Fully active, able to carry on " all predisease performance without restriction    Physical Exam   Constitutional: She is oriented to person, place, and time. She appears well-developed and well-nourished.   HENT:   Head: Normocephalic.   Right Ear: External ear normal.   Left Ear: External ear normal.   Nose: Nose normal.   Eyes:   Neck: Normal range of motion.   Pulmonary/Chest: Effort normal.   Musculoskeletal: Normal range of motion.   Neurological: She is alert and oriented to person, place, and time.   Psychiatric: She has a normal mood and affect. Her behavior is normal.     I have reexamined the patient and the results are consistent with the previously documented exam. Wilder Reed MD     RECENT LABS  WBC   Date Value Ref Range Status   07/30/2024 6.4 3.4 - 10.8 x10E3/uL Final     RBC   Date Value Ref Range Status   07/30/2024 3.72 (L) 3.77 - 5.28 x10E6/uL Final     Hemoglobin   Date Value Ref Range Status   07/30/2024 11.9 11.1 - 15.9 g/dL Final   04/15/2024 13.6 12.0 - 15.9 g/dL Final     Hematocrit   Date Value Ref Range Status   07/30/2024 36.0 34.0 - 46.6 % Final   04/15/2024 39.3 34.0 - 46.6 % Final     MCV   Date Value Ref Range Status   07/30/2024 97 79 - 97 fL Final   04/15/2024 94.9 79.0 - 97.0 fL Final     MCH   Date Value Ref Range Status   07/30/2024 32.0 26.6 - 33.0 pg Final   04/15/2024 32.9 26.6 - 33.0 pg Final     MCHC   Date Value Ref Range Status   07/30/2024 33.1 31.5 - 35.7 g/dL Final   04/15/2024 34.6 31.5 - 35.7 g/dL Final     RDW   Date Value Ref Range Status   07/30/2024 11.9 11.7 - 15.4 % Final   04/15/2024 11.5 (L) 12.3 - 15.4 % Final     RDW-SD   Date Value Ref Range Status   04/15/2024 39.7 37.0 - 54.0 fl Final     MPV   Date Value Ref Range Status   04/15/2024 8.1 6.0 - 12.0 fL Final     Platelets   Date Value Ref Range Status   07/30/2024 430 150 - 450 x10E3/uL Final   04/15/2024 431 140 - 450 10*3/mm3 Final     Neutrophil Rel %   Date Value Ref Range Status   07/30/2024 60 Not Estab. % Final      Neutrophil %   Date Value Ref Range Status   04/15/2024 74.1 42.7 - 76.0 % Final     Lymphocyte Rel %   Date Value Ref Range Status   07/30/2024 32 Not Estab. % Final     Lymphocyte %   Date Value Ref Range Status   04/15/2024 19.9 19.6 - 45.3 % Final     Monocyte Rel %   Date Value Ref Range Status   07/30/2024 6 Not Estab. % Final     Monocyte %   Date Value Ref Range Status   04/15/2024 5.1 5.0 - 12.0 % Final     Eosinophil Rel %   Date Value Ref Range Status   07/30/2024 1 Not Estab. % Final     Eosinophil %   Date Value Ref Range Status   04/15/2024 0.4 0.3 - 6.2 % Final     Basophil Rel %   Date Value Ref Range Status   07/30/2024 1 Not Estab. % Final     Basophil %   Date Value Ref Range Status   04/15/2024 0.4 0.0 - 1.5 % Final     Immature Grans %   Date Value Ref Range Status   04/15/2024 0.1 0.0 - 0.5 % Final     Neutrophils Absolute   Date Value Ref Range Status   07/30/2024 3.9 1.4 - 7.0 x10E3/uL Final     Neutrophils, Absolute   Date Value Ref Range Status   04/15/2024 5.75 1.70 - 7.00 10*3/mm3 Final     Lymphocytes Absolute   Date Value Ref Range Status   07/30/2024 2.1 0.7 - 3.1 x10E3/uL Final     Lymphocytes, Absolute   Date Value Ref Range Status   04/15/2024 1.55 0.70 - 3.10 10*3/mm3 Final     Monocytes Absolute   Date Value Ref Range Status   07/30/2024 0.4 0.1 - 0.9 x10E3/uL Final     Monocytes, Absolute   Date Value Ref Range Status   04/15/2024 0.40 0.10 - 0.90 10*3/mm3 Final     Eosinophils Absolute   Date Value Ref Range Status   07/30/2024 0.0 0.0 - 0.4 x10E3/uL Final     Eosinophils, Absolute   Date Value Ref Range Status   04/15/2024 0.03 0.00 - 0.40 10*3/mm3 Final     Basophils Absolute   Date Value Ref Range Status   07/30/2024 0.1 0.0 - 0.2 x10E3/uL Final     Basophils, Absolute   Date Value Ref Range Status   04/15/2024 0.03 0.00 - 0.20 10*3/mm3 Final     Immature Grans, Absolute   Date Value Ref Range Status   04/15/2024 0.01 0.00 - 0.05 10*3/mm3 Final     nRBC   Date Value Ref  Range Status   04/15/2024 0.0 0.0 - 0.2 /100 WBC Final       Lab Results   Component Value Date    GLUCOSE 111 (H) 07/30/2024    BUN 16 07/30/2024    CREATININE 0.76 07/30/2024    EGFRIFNONA 104 09/03/2021    BCR 21 07/30/2024    K 3.9 07/30/2024    CO2 23 07/30/2024    CALCIUM 9.4 07/30/2024    PROTENTOTREF 6.7 07/30/2024    ALBUMIN 4.5 07/30/2024    LABIL2 1.3 05/29/2019    AST 18 07/30/2024    ALT 21 07/30/2024     ASSESSMENT:    Thrombocytosis: Resolved.  Vitamin B12 deficiency: Resolved.  Mild iron deficiency: Resolved.  Reviewed the blood count, iron studies and chemistry.  Discussed results with her.  See me in a year with new laboratory exams.  Sooner if needed.    PLAN:     As above.    Wilder Reed MD on 8/6/2024 at 1540.

## 2024-08-06 ENCOUNTER — OFFICE VISIT (OUTPATIENT)
Dept: ONCOLOGY | Facility: CLINIC | Age: 39
End: 2024-08-06
Payer: OTHER GOVERNMENT

## 2024-08-06 VITALS
BODY MASS INDEX: 32.2 KG/M2 | HEART RATE: 73 BPM | TEMPERATURE: 97.7 F | WEIGHT: 175 LBS | OXYGEN SATURATION: 97 % | HEIGHT: 62 IN

## 2024-08-06 DIAGNOSIS — E61.1 IRON DEFICIENCY: ICD-10-CM

## 2024-08-06 DIAGNOSIS — D75.839 THROMBOCYTOSIS: ICD-10-CM

## 2024-08-06 DIAGNOSIS — R79.89 OTHER SPECIFIED ABNORMAL FINDINGS OF BLOOD CHEMISTRY: Primary | ICD-10-CM

## 2024-08-06 PROBLEM — H16.229 KERATOCONJUNCTIVITIS SICCA: Status: ACTIVE | Noted: 2024-06-25

## 2024-08-06 PROBLEM — M35.01 KERATOCONJUNCTIVITIS SICCA: Status: ACTIVE | Noted: 2024-06-25

## 2024-08-06 PROBLEM — H33.059: Status: ACTIVE | Noted: 2020-09-04

## 2024-08-06 PROBLEM — H52.222 REGULAR ASTIGMATISM OF LEFT EYE: Status: ACTIVE | Noted: 2020-09-04

## 2024-08-06 PROBLEM — H52.12 MYOPIA OF LEFT EYE: Status: ACTIVE | Noted: 2020-09-04

## 2024-08-06 PROBLEM — H40.053 RAISED INTRAOCULAR PRESSURE OF BOTH EYES: Status: ACTIVE | Noted: 2024-06-25

## 2024-08-06 PROCEDURE — 99213 OFFICE O/P EST LOW 20 MIN: CPT | Performed by: INTERNAL MEDICINE

## 2024-08-06 RX ORDER — BRIMONIDINE TARTRATE, TIMOLOL MALEATE 2; 5 MG/ML; MG/ML
1 SOLUTION/ DROPS OPHTHALMIC 2 TIMES DAILY
COMMUNITY
Start: 2024-06-26

## 2024-08-06 RX ORDER — LISDEXAMFETAMINE DIMESYLATE 10 MG/1
10 CAPSULE ORAL DAILY
COMMUNITY
Start: 2024-07-19

## 2024-08-06 RX ORDER — PERFLUOROHEXYLOCTANE 1 MG/MG
SOLUTION OPHTHALMIC
COMMUNITY
Start: 2024-06-25 | End: 2025-06-24

## 2024-09-09 ENCOUNTER — OFFICE VISIT (OUTPATIENT)
Dept: PSYCHIATRY | Facility: CLINIC | Age: 39
End: 2024-09-09
Payer: OTHER GOVERNMENT

## 2024-09-09 DIAGNOSIS — F90.2 ADHD (ATTENTION DEFICIT HYPERACTIVITY DISORDER), COMBINED TYPE: Primary | Chronic | ICD-10-CM

## 2024-09-09 DIAGNOSIS — Z79.899 ENCOUNTER FOR LONG-TERM (CURRENT) USE OF OTHER MEDICATIONS: ICD-10-CM

## 2024-09-09 PROCEDURE — 90792 PSYCH DIAG EVAL W/MED SRVCS: CPT

## 2024-09-09 RX ORDER — DEXTROAMPHETAMINE SACCHARATE, AMPHETAMINE ASPARTATE MONOHYDRATE, DEXTROAMPHETAMINE SULFATE AND AMPHETAMINE SULFATE 2.5; 2.5; 2.5; 2.5 MG/1; MG/1; MG/1; MG/1
10 CAPSULE, EXTENDED RELEASE ORAL DAILY
Qty: 30 CAPSULE | Refills: 0 | Status: SHIPPED | OUTPATIENT
Start: 2024-09-09 | End: 2025-09-09

## 2024-09-09 RX ORDER — LISDEXAMFETAMINE DIMESYLATE 20 MG/1
1 CAPSULE ORAL DAILY
COMMUNITY
Start: 2024-08-20
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
## 2024-09-12 LAB
1OH-MIDAZOLAM UR QL SCN: NOT DETECTED NG/MG CREAT
6MAM UR QL SCN: NEGATIVE NG/ML
7AMINOCLONAZEPAM/CREAT UR: NOT DETECTED NG/MG CREAT
8OH-AMOXAPINE UR QL: NOT DETECTED
8OH-LOXAPINE UR QL SCN: NOT DETECTED
A-OH ALPRAZ/CREAT UR: NOT DETECTED NG/MG CREAT
A-OH-TRIAZOLAM/CREAT UR CFM: NOT DETECTED NG/MG CREAT
ALPRAZ/CREAT UR CFM: NOT DETECTED NG/MG CREAT
AMITRIP UR-MCNC: NOT DETECTED NG/ML
AMOXAPINE UR QL: NOT DETECTED
AMPHET UR CFM-MCNC: 3900 NG/MG CREAT
AMPHETAMINES UR QL SCN: NORMAL NG/ML
AMPHETAMINES UR QL: NORMAL
ANTICONVULSANTS UR: NEGATIVE
ANTIPSYCHOTICS UR: NEGATIVE
ARIPIPRAZOLE UR QL SCN: NOT DETECTED
ASENAPINE UR QL CFM: NOT DETECTED
BARBITURATES UR QL SCN: NEGATIVE NG/ML
BENZODIAZ SCN METH UR: NEGATIVE
BUPRENORPHINE UR QL SCN: NEGATIVE NG/ML
BUPROPION UR QL: NOT DETECTED
CANNABINOIDS UR QL SCN: NEGATIVE NG/ML
CARISOPRODOL UR QL: NEGATIVE NG/ML
CHLORPROMAZINE UR QL: NOT DETECTED
CITALOPRAM, UR: NOT DETECTED
CLOMIPRAMINE UR-MCNC: NOT DETECTED NG/ML
CLONAZEPAM/CREAT UR CFM: NOT DETECTED NG/MG CREAT
CLOZAPINE UR QL: NOT DETECTED
COCAINE+BZE UR QL SCN: NEGATIVE NG/ML
CREAT UR-MCNC: 48 MG/DL
DESALKYLFLURAZ/CREAT UR: NOT DETECTED NG/MG CREAT
DESIPRAMINE UR-MCNC: NOT DETECTED NG/ML
DIAZEPAM/CREAT UR: NOT DETECTED NG/MG CREAT
DOXEPIN UR-MCNC: NOT DETECTED NG/ML
DULOXETINE UR QL: NOT DETECTED
ETHANOL UR QL SCN: NEGATIVE NG/ML
FENTANYL UR QL SCN: NEGATIVE NG/ML
FLUNITRAZEPAM UR QL SCN: NOT DETECTED NG/MG CREAT
FLUOXETINE UR QL SCN: NOT DETECTED
FLUPHENAZINE UR-MCNC: NOT DETECTED NG/ML
FLUVOXAMINE UR QL: NOT DETECTED
GABAPENTIN UR-MCNC: NEGATIVE UG/ML
HALOPERIDOL UR QL: NOT DETECTED
ILOPERIDONE UR QL CFM: NOT DETECTED
IMIPRAMINE UR-MCNC: NOT DETECTED NG/ML
KRATOM IA, UR: NEGATIVE NG/ML
LORAZEPAM/CREAT UR: NOT DETECTED NG/MG CREAT
LOXAPINE UR QL: NOT DETECTED
LURASIDONE UR QL CFM: NOT DETECTED
MAPROTILINE UR QL: NOT DETECTED
MDA UR CFM-MCNC: NOT DETECTED NG/MG CREAT
MDMA UR CFM-MCNC: NOT DETECTED NG/MG CREAT
ME-PHENIDATE UR QL SCN: NEGATIVE NG/ML
MESORIDAZINE UR QL: NOT DETECTED
METHADONE UR QL SCN: NEGATIVE NG/ML
METHADONE+METAB UR QL SCN: NEGATIVE NG/ML
METHAMPHET UR CFM-MCNC: NOT DETECTED NG/MG CREAT
MIDAZOLAM/CREAT UR CFM: NOT DETECTED NG/MG CREAT
MIRTAZAPINE UR-MCNC: NOT DETECTED UG/ML
MOLINDONE UR QL SCN: NOT DETECTED
NEFAZODONE UR QL: NOT DETECTED
NORCITALOPRAM UR QL: NOT DETECTED
NORCLOMIPRAMINE UR QL: NOT DETECTED
NORCLOZAPINE UR QL: NOT DETECTED
NORDIAZEPAM/CREAT UR: NOT DETECTED NG/MG CREAT
NORDOXEPIN UR QL: NOT DETECTED
NORFLUNITRAZEPAM UR-MCNC: NOT DETECTED NG/MG CREAT
NORFLUOXETINE UR-MCNC: NOT DETECTED NG/ML
NORSERTRALINE UR QL: NOT DETECTED
NORTRIP UR-MCNC: NOT DETECTED NG/ML
ODV UR-MCNC: NOT DETECTED NG/ML
OH-BUPROPION UR-MCNC: NOT DETECTED NG/ML
OLANZAPINE UR CFM-MCNC: NOT DETECTED NG/ML
OPIATES UR SCN-MCNC: NEGATIVE NG/ML
OXAZEPAM/CREAT UR: NOT DETECTED NG/MG CREAT
OXYCODONE CTO UR SCN-MCNC: NEGATIVE NG/ML
PAROXETINE UR-MCNC: NOT DETECTED NG/L
PCP UR QL SCN: NEGATIVE NG/ML
PERPHENAZINE UR QL: NOT DETECTED
PIMOZIDE, UR: NOT DETECTED
PREGABALIN UR QL SCN: NOT DETECTED
PRESCRIBED MEDICATIONS: NORMAL
PROCHLORPERAZINE UR QL: NOT DETECTED
PROPOXYPH UR QL SCN: NEGATIVE NG/ML
PROTRIP UR QL: NOT DETECTED
QUETIAPINE CTO UR CFM-MCNC: NOT DETECTED NG/ML
RISPERIDONE UR QL: NOT DETECTED
SERTRALINE UR-MCNC: NOT DETECTED NG/ML
TAPENTADOL CTO UR SCN-MCNC: NEGATIVE NG/ML
TEMAZEPAM/CREAT UR: NOT DETECTED NG/MG CREAT
THIORIDAZINE UR-MCNC: NOT DETECTED UG/ML
THIOTHIXENE UR QL: NOT DETECTED
TRAMADOL UR QL SCN: NEGATIVE NG/ML
TRAZODONE UR QL: NOT DETECTED
TRAZODONE UR-MCNC: NOT DETECTED UG/ML
TRICYCLICS TESTED UR SCN: NEGATIVE
TRIFPERAZINE UR QL: NOT DETECTED
TRIMIPRAMINE UR QL: NOT DETECTED
VENLAFAXINE UR QL: NOT DETECTED
VILAZ UR QL SCN: NOT DETECTED
ZIPRASIDONE UR QL SCN: NOT DETECTED

## 2024-09-19 ENCOUNTER — TELEPHONE (OUTPATIENT)
Dept: PSYCHIATRY | Facility: CLINIC | Age: 39
End: 2024-09-19
Payer: OTHER GOVERNMENT

## 2024-10-08 DIAGNOSIS — F90.2 ADHD (ATTENTION DEFICIT HYPERACTIVITY DISORDER), COMBINED TYPE: Chronic | ICD-10-CM

## 2024-10-08 RX ORDER — DEXTROAMPHETAMINE SACCHARATE, AMPHETAMINE ASPARTATE MONOHYDRATE, DEXTROAMPHETAMINE SULFATE AND AMPHETAMINE SULFATE 3.75; 3.75; 3.75; 3.75 MG/1; MG/1; MG/1; MG/1
15 CAPSULE, EXTENDED RELEASE ORAL DAILY
Qty: 30 CAPSULE | Refills: 0 | Status: SHIPPED | OUTPATIENT
Start: 2024-10-08 | End: 2025-10-08

## 2024-10-08 RX ORDER — DEXTROAMPHETAMINE SACCHARATE, AMPHETAMINE ASPARTATE MONOHYDRATE, DEXTROAMPHETAMINE SULFATE AND AMPHETAMINE SULFATE 2.5; 2.5; 2.5; 2.5 MG/1; MG/1; MG/1; MG/1
10 CAPSULE, EXTENDED RELEASE ORAL DAILY
Qty: 30 CAPSULE | Refills: 0 | Status: CANCELLED | OUTPATIENT
Start: 2024-10-08 | End: 2025-10-08

## 2024-10-08 NOTE — TELEPHONE ENCOUNTER
Rx Refill Note  Requested Prescriptions     Pending Prescriptions Disp Refills    amphetamine-dextroamphetamine XR (Adderall XR) 10 MG 24 hr capsule 30 capsule 0     Sig: Take 1 capsule by mouth Daily        Last office visit with prescribing clinician: 9/9/2024     Next office visit with prescribing clinician: 11/11/2024     Office Visit with Asya Yañez APRN (09/09/2024)     ToxAssure Flex 22, Ur w/DL - Urine, Random Void (09/09/2024 12:01)     BEHAVIORAL HEALTH - SCAN - Inspect report, Kellie, 10/8/24 (10/08/2024)     Inspect Fill 9/9/24    Mayuri Perea  10/08/24, 12:13 EDT     Patient asking for the next higher dose if possible. Medication wearing off late morning

## 2024-11-12 ENCOUNTER — TELEMEDICINE (OUTPATIENT)
Dept: PSYCHIATRY | Facility: CLINIC | Age: 39
End: 2024-11-12
Payer: OTHER GOVERNMENT

## 2024-11-12 DIAGNOSIS — F90.2 ADHD (ATTENTION DEFICIT HYPERACTIVITY DISORDER), COMBINED TYPE: Primary | Chronic | ICD-10-CM

## 2024-11-12 RX ORDER — DEXTROAMPHETAMINE SACCHARATE, AMPHETAMINE ASPARTATE MONOHYDRATE, DEXTROAMPHETAMINE SULFATE AND AMPHETAMINE SULFATE 3.75; 3.75; 3.75; 3.75 MG/1; MG/1; MG/1; MG/1
15 CAPSULE, EXTENDED RELEASE ORAL DAILY
Qty: 30 CAPSULE | Refills: 0 | Status: SHIPPED | OUTPATIENT
Start: 2024-11-12 | End: 2025-11-12
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
## 2024-12-09 DIAGNOSIS — F90.2 ADHD (ATTENTION DEFICIT HYPERACTIVITY DISORDER), COMBINED TYPE: Chronic | ICD-10-CM

## 2024-12-09 RX ORDER — DEXTROAMPHETAMINE SACCHARATE, AMPHETAMINE ASPARTATE MONOHYDRATE, DEXTROAMPHETAMINE SULFATE AND AMPHETAMINE SULFATE 3.75; 3.75; 3.75; 3.75 MG/1; MG/1; MG/1; MG/1
15 CAPSULE, EXTENDED RELEASE ORAL DAILY
Qty: 30 CAPSULE | Refills: 0 | Status: CANCELLED | OUTPATIENT
Start: 2024-12-09 | End: 2025-12-09

## 2024-12-09 RX ORDER — DEXTROAMPHETAMINE SACCHARATE, AMPHETAMINE ASPARTATE MONOHYDRATE, DEXTROAMPHETAMINE SULFATE AND AMPHETAMINE SULFATE 5; 5; 5; 5 MG/1; MG/1; MG/1; MG/1
20 CAPSULE, EXTENDED RELEASE ORAL EVERY MORNING
Qty: 30 CAPSULE | Refills: 0 | Status: SHIPPED | OUTPATIENT
Start: 2024-12-09

## 2024-12-09 NOTE — TELEPHONE ENCOUNTER
Rx Refill Note  Requested Prescriptions     Pending Prescriptions Disp Refills    amphetamine-dextroamphetamine XR (Adderall XR) 15 MG 24 hr capsule 30 capsule 0     Sig: Take 1 capsule by mouth Daily        Last office visit with prescribing clinician: 9/9/2024     Next office visit with prescribing clinician: 1/13/2025     Telemedicine with Asya Yañez APRN (11/12/2024)     ToxAssure Flex 22, Ur w/DL - Urine, Random Void (09/09/2024 12:01)     BEHAVIORAL HEALTH - SCAN - Inspect Report, JASSI Yañez 12-9-2024 (12/09/2024)     Inspect Fill 11/12/2024    Mayuri Perea  12/09/24, 11:13 EST     Asking for a refill to the next mg please.

## 2025-01-06 DIAGNOSIS — F90.2 ADHD (ATTENTION DEFICIT HYPERACTIVITY DISORDER), COMBINED TYPE: Chronic | ICD-10-CM

## 2025-01-07 RX ORDER — DEXTROAMPHETAMINE SACCHARATE, AMPHETAMINE ASPARTATE MONOHYDRATE, DEXTROAMPHETAMINE SULFATE AND AMPHETAMINE SULFATE 5; 5; 5; 5 MG/1; MG/1; MG/1; MG/1
20 CAPSULE, EXTENDED RELEASE ORAL EVERY MORNING
Qty: 30 CAPSULE | Refills: 0 | Status: SHIPPED | OUTPATIENT
Start: 2025-01-07

## 2025-01-07 NOTE — TELEPHONE ENCOUNTER
Rx Refill Note  Requested Prescriptions     Pending Prescriptions Disp Refills    amphetamine-dextroamphetamine XR (Adderall XR) 20 MG 24 hr capsule 30 capsule 0     Sig: Take 1 capsule by mouth Every Morning        Last office visit with prescribing clinician: 11/12/24     Next office visit with prescribing clinician: 1/13/2025     Telemedicine with Asya Yañez APRN (11/12/2024)     ToxAssure Flex 22, Ur w/DL - Urine, Random Void (09/09/2024 12:01)     BEHAVIORAL HEALTH - SCAN - Inspect report, Kellie, 1/7/25 (01/07/2025)     Inspect Fill 12/10/24    Mayuri Perea  01/07/25, 10:42 EST

## 2025-02-05 DIAGNOSIS — F90.2 ADHD (ATTENTION DEFICIT HYPERACTIVITY DISORDER), COMBINED TYPE: Chronic | ICD-10-CM

## 2025-02-05 RX ORDER — DEXTROAMPHETAMINE SACCHARATE, AMPHETAMINE ASPARTATE MONOHYDRATE, DEXTROAMPHETAMINE SULFATE AND AMPHETAMINE SULFATE 5; 5; 5; 5 MG/1; MG/1; MG/1; MG/1
20 CAPSULE, EXTENDED RELEASE ORAL EVERY MORNING
Qty: 30 CAPSULE | Refills: 0 | Status: SHIPPED | OUTPATIENT
Start: 2025-02-05

## 2025-02-05 RX ORDER — DEXTROAMPHETAMINE SACCHARATE, AMPHETAMINE ASPARTATE, DEXTROAMPHETAMINE SULFATE AND AMPHETAMINE SULFATE 2.5; 2.5; 2.5; 2.5 MG/1; MG/1; MG/1; MG/1
10 TABLET ORAL
Qty: 30 TABLET | Refills: 0 | Status: SHIPPED | OUTPATIENT
Start: 2025-02-05 | End: 2026-02-05

## 2025-03-01 DIAGNOSIS — F90.2 ADHD (ATTENTION DEFICIT HYPERACTIVITY DISORDER), COMBINED TYPE: Chronic | ICD-10-CM

## 2025-03-01 RX ORDER — DEXTROAMPHETAMINE SACCHARATE, AMPHETAMINE ASPARTATE, DEXTROAMPHETAMINE SULFATE AND AMPHETAMINE SULFATE 2.5; 2.5; 2.5; 2.5 MG/1; MG/1; MG/1; MG/1
10 TABLET ORAL
Qty: 30 TABLET | Refills: 0 | Status: SHIPPED | OUTPATIENT
Start: 2025-03-01 | End: 2026-03-01

## 2025-03-01 RX ORDER — DEXTROAMPHETAMINE SACCHARATE, AMPHETAMINE ASPARTATE MONOHYDRATE, DEXTROAMPHETAMINE SULFATE AND AMPHETAMINE SULFATE 5; 5; 5; 5 MG/1; MG/1; MG/1; MG/1
20 CAPSULE, EXTENDED RELEASE ORAL EVERY MORNING
Qty: 30 CAPSULE | Refills: 0 | Status: SHIPPED | OUTPATIENT
Start: 2025-03-01

## 2025-04-02 ENCOUNTER — OFFICE VISIT (OUTPATIENT)
Dept: FAMILY MEDICINE CLINIC | Facility: CLINIC | Age: 40
End: 2025-04-02
Payer: OTHER GOVERNMENT

## 2025-04-02 ENCOUNTER — LAB (OUTPATIENT)
Dept: FAMILY MEDICINE CLINIC | Facility: CLINIC | Age: 40
End: 2025-04-02
Payer: OTHER GOVERNMENT

## 2025-04-02 VITALS
OXYGEN SATURATION: 98 % | HEIGHT: 62 IN | SYSTOLIC BLOOD PRESSURE: 126 MMHG | DIASTOLIC BLOOD PRESSURE: 87 MMHG | BODY MASS INDEX: 28.45 KG/M2 | TEMPERATURE: 96.8 F | HEART RATE: 93 BPM | WEIGHT: 154.6 LBS

## 2025-04-02 DIAGNOSIS — Z00.01 ENCOUNTER FOR ANNUAL GENERAL MEDICAL EXAMINATION WITH ABNORMAL FINDINGS IN ADULT: Primary | ICD-10-CM

## 2025-04-02 DIAGNOSIS — H40.9 GLAUCOMA OF RIGHT EYE, UNSPECIFIED GLAUCOMA TYPE: ICD-10-CM

## 2025-04-02 DIAGNOSIS — F41.8 MIXED ANXIETY DEPRESSIVE DISORDER: ICD-10-CM

## 2025-04-02 DIAGNOSIS — F90.2 ADHD (ATTENTION DEFICIT HYPERACTIVITY DISORDER), COMBINED TYPE: Chronic | ICD-10-CM

## 2025-04-02 DIAGNOSIS — R73.9 HYPERGLYCEMIA: ICD-10-CM

## 2025-04-02 DIAGNOSIS — F90.2 ATTENTION DEFICIT HYPERACTIVITY DISORDER (ADHD), COMBINED TYPE: ICD-10-CM

## 2025-04-02 DIAGNOSIS — R79.89 ELEVATED FERRITIN: ICD-10-CM

## 2025-04-02 DIAGNOSIS — E55.9 VITAMIN D DEFICIENCY: ICD-10-CM

## 2025-04-02 DIAGNOSIS — G47.33 OSA (OBSTRUCTIVE SLEEP APNEA): ICD-10-CM

## 2025-04-02 DIAGNOSIS — E53.8 B12 DEFICIENCY: ICD-10-CM

## 2025-04-02 DIAGNOSIS — M54.12 CERVICAL RADICULOPATHY: ICD-10-CM

## 2025-04-02 LAB
25(OH)D3 SERPL-MCNC: 47.5 NG/ML (ref 30–100)
ALBUMIN SERPL-MCNC: 4.3 G/DL (ref 3.5–5.2)
ALBUMIN/GLOB SERPL: 1.3 G/DL
ALP SERPL-CCNC: 65 U/L (ref 39–117)
ALT SERPL W P-5'-P-CCNC: 29 U/L (ref 1–33)
ANION GAP SERPL CALCULATED.3IONS-SCNC: 9.1 MMOL/L (ref 5–15)
AST SERPL-CCNC: 24 U/L (ref 1–32)
BASOPHILS # BLD AUTO: 0.03 10*3/MM3 (ref 0–0.2)
BASOPHILS NFR BLD AUTO: 0.7 % (ref 0–1.5)
BILIRUB SERPL-MCNC: 0.5 MG/DL (ref 0–1.2)
BUN SERPL-MCNC: 9 MG/DL (ref 6–20)
BUN/CREAT SERPL: 12.3 (ref 7–25)
CALCIUM SPEC-SCNC: 10 MG/DL (ref 8.6–10.5)
CHLORIDE SERPL-SCNC: 103 MMOL/L (ref 98–107)
CHOLEST SERPL-MCNC: 207 MG/DL (ref 0–200)
CO2 SERPL-SCNC: 24.9 MMOL/L (ref 22–29)
CREAT SERPL-MCNC: 0.73 MG/DL (ref 0.57–1)
DEPRECATED RDW RBC AUTO: 40.4 FL (ref 37–54)
EGFRCR SERPLBLD CKD-EPI 2021: 108.1 ML/MIN/1.73
EOSINOPHIL # BLD AUTO: 0.05 10*3/MM3 (ref 0–0.4)
EOSINOPHIL NFR BLD AUTO: 1.1 % (ref 0.3–6.2)
ERYTHROCYTE [DISTWIDTH] IN BLOOD BY AUTOMATED COUNT: 11.8 % (ref 12.3–15.4)
GLOBULIN UR ELPH-MCNC: 3.2 GM/DL
GLUCOSE SERPL-MCNC: 95 MG/DL (ref 65–99)
HBA1C MFR BLD: 5.3 % (ref 4.8–5.6)
HCT VFR BLD AUTO: 37.2 % (ref 34–46.6)
HDLC SERPL-MCNC: 56 MG/DL (ref 40–60)
HGB BLD-MCNC: 13.2 G/DL (ref 12–15.9)
IMM GRANULOCYTES # BLD AUTO: 0.01 10*3/MM3 (ref 0–0.05)
IMM GRANULOCYTES NFR BLD AUTO: 0.2 % (ref 0–0.5)
LDLC SERPL CALC-MCNC: 136 MG/DL (ref 0–100)
LDLC/HDLC SERPL: 2.4 {RATIO}
LYMPHOCYTES # BLD AUTO: 1.59 10*3/MM3 (ref 0.7–3.1)
LYMPHOCYTES NFR BLD AUTO: 35.3 % (ref 19.6–45.3)
MCH RBC QN AUTO: 33.1 PG (ref 26.6–33)
MCHC RBC AUTO-ENTMCNC: 35.5 G/DL (ref 31.5–35.7)
MCV RBC AUTO: 93.2 FL (ref 79–97)
MONOCYTES # BLD AUTO: 0.39 10*3/MM3 (ref 0.1–0.9)
MONOCYTES NFR BLD AUTO: 8.6 % (ref 5–12)
NEUTROPHILS NFR BLD AUTO: 2.44 10*3/MM3 (ref 1.7–7)
NEUTROPHILS NFR BLD AUTO: 54.1 % (ref 42.7–76)
NRBC BLD AUTO-RTO: 0 /100 WBC (ref 0–0.2)
PLATELET # BLD AUTO: 500 10*3/MM3 (ref 140–450)
PMV BLD AUTO: 8.6 FL (ref 6–12)
POTASSIUM SERPL-SCNC: 4.2 MMOL/L (ref 3.5–5.2)
PROT SERPL-MCNC: 7.5 G/DL (ref 6–8.5)
RBC # BLD AUTO: 3.99 10*6/MM3 (ref 3.77–5.28)
SODIUM SERPL-SCNC: 137 MMOL/L (ref 136–145)
TRIGL SERPL-MCNC: 83 MG/DL (ref 0–150)
TSH SERPL DL<=0.05 MIU/L-ACNC: 0.78 UIU/ML (ref 0.27–4.2)
VIT B12 BLD-MCNC: 911 PG/ML (ref 211–946)
VLDLC SERPL-MCNC: 15 MG/DL (ref 5–40)
WBC NRBC COR # BLD AUTO: 4.51 10*3/MM3 (ref 3.4–10.8)

## 2025-04-02 PROCEDURE — 80061 LIPID PANEL: CPT | Performed by: NURSE PRACTITIONER

## 2025-04-02 PROCEDURE — 82607 VITAMIN B-12: CPT | Performed by: NURSE PRACTITIONER

## 2025-04-02 PROCEDURE — 85025 COMPLETE CBC W/AUTO DIFF WBC: CPT | Performed by: NURSE PRACTITIONER

## 2025-04-02 PROCEDURE — 83036 HEMOGLOBIN GLYCOSYLATED A1C: CPT | Performed by: NURSE PRACTITIONER

## 2025-04-02 PROCEDURE — 80053 COMPREHEN METABOLIC PANEL: CPT | Performed by: NURSE PRACTITIONER

## 2025-04-02 PROCEDURE — 82306 VITAMIN D 25 HYDROXY: CPT | Performed by: NURSE PRACTITIONER

## 2025-04-02 PROCEDURE — 84443 ASSAY THYROID STIM HORMONE: CPT | Performed by: NURSE PRACTITIONER

## 2025-04-02 PROCEDURE — 36415 COLL VENOUS BLD VENIPUNCTURE: CPT | Performed by: NURSE PRACTITIONER

## 2025-04-02 RX ORDER — DEXTROAMPHETAMINE SACCHARATE, AMPHETAMINE ASPARTATE, DEXTROAMPHETAMINE SULFATE AND AMPHETAMINE SULFATE 2.5; 2.5; 2.5; 2.5 MG/1; MG/1; MG/1; MG/1
10 TABLET ORAL
Qty: 30 TABLET | Refills: 0 | Status: SHIPPED | OUTPATIENT
Start: 2025-04-02 | End: 2026-04-02

## 2025-04-02 RX ORDER — DEXTROAMPHETAMINE SACCHARATE, AMPHETAMINE ASPARTATE MONOHYDRATE, DEXTROAMPHETAMINE SULFATE AND AMPHETAMINE SULFATE 5; 5; 5; 5 MG/1; MG/1; MG/1; MG/1
20 CAPSULE, EXTENDED RELEASE ORAL EVERY MORNING
Qty: 30 CAPSULE | Refills: 0 | Status: SHIPPED | OUTPATIENT
Start: 2025-04-02

## 2025-04-02 NOTE — PROGRESS NOTES
Subjective   Chrystal Walters is a 39 y.o. female presents for   Chief Complaint   Patient presents with    Hyperlipidemia    Annual Exam     Is fasting       Health Maintenance Due   Topic Date Due    COVID-19 Vaccine (1 - 2024-25 season) Never done    ANNUAL PHYSICAL  04/01/2025    LIPID PANEL  04/01/2025       History of Present Illness  The patient is a 39-year-old female who presents for an annual physical and follow-up of her cholesterol.    Since her last appt, she has been diagnosed with ADHD. She was previously prescribed Vyvanse, which she discontinued due to side effects of sticky eyelids and increased intraocular pressure. She was then started on Adderall by Asya Yañez, which has significantly improved her focus and task management without causing any pressure issues. She reports an improvement in her anxiety symptoms since starting Adderall.    She has a history of neck issues and was under the care of Dr. Faye, who administered an epidural injection. She reports no facial numbness but experiences tenderness in her fingers that radiates down her spine upon touch. Her neck discomfort persists, particularly with repetitive movements, although it is not severe. She has only required muscle relaxants and gabapentin twice in the past year. She has not engaged in yoga or progressive relaxation exercises but has saved videos for neck stretches. She owns a home TENS unit and uses a stick with balls for trigger point therapy. She also receives massage therapy, which she finds beneficial.    She has elevated ferritin levels, the cause of which remains undetermined despite genetic testing. Her platelet count has stabilized. She has an appointment scheduled for 08/2025.    She is on a weekly regimen of vitamin D supplements and requests a lab test to monitor her levels. She noticed an improvement in her neck issues when she started taking vitamin D supplements.    She has a history of elevated B12 levels and  "requests a lab test for this.    She requires a referral for glaucoma management as her previous physician has retired. She reports no current issues related to this condition.    She experiences medical anxiety, which she attributes to the recent loss of a friend to cancer. She is interested in counseling for trauma management.    She has declined the offer of a tetanus vaccine today. She underwent a Pap smear a month ago at [facility] in [place], which returned negative results.    She has lost weight, dropping from 180 pounds to 157 pounds due to healthier eating, and has increased her physical activity.     She has been diagnosed with sleep apnea but does not use her machine. She is seeking a referral for further evaluation and potential retesting. She has noticed a decrease in her oxygen levels at night, ranging from 77 to 80.    FAMILY HISTORY  Her father has sleep apnea.    MEDICATIONS  Current: Adderall, gabapentin, vitamin D.  Discontinued: Vyvanse.    IMMUNIZATIONS  She declined the tetanus vaccine today.    Vitals:    04/02/25 1021   BP: 126/87   BP Location: Left arm   Patient Position: Sitting   Cuff Size: Adult   Pulse: 93   Temp: 96.8 °F (36 °C)   TempSrc: Infrared   SpO2: 98%   Weight: 70.1 kg (154 lb 9.6 oz)   Height: 157.5 cm (62.01\")     Body mass index is 28.27 kg/m².    Current Outpatient Medications on File Prior to Visit   Medication Sig Dispense Refill    amphetamine-dextroamphetamine (Adderall) 10 MG tablet Take 1 tablet by mouth Every Afternoon. 30 tablet 0    amphetamine-dextroamphetamine XR (Adderall XR) 20 MG 24 hr capsule Take 1 capsule by mouth Every Morning 30 capsule 0    vitamin D (ERGOCALCIFEROL) 1.25 MG (16352 UT) capsule capsule Take 1 capsule by mouth 1 (One) Time Per Week. 5 capsule 11     No current facility-administered medications on file prior to visit.       The following portions of the patient's history were reviewed and updated as appropriate: allergies, current " medications, past family history, past medical history, past social history, past surgical history, and problem list.    Review of Systems   Constitutional:  Positive for fatigue.   Musculoskeletal:  Positive for neck pain.   Psychiatric/Behavioral:  The patient is nervous/anxious.        Objective   Physical Exam  Vitals and nursing note reviewed.   Constitutional:       Appearance: Normal appearance. She is well-developed.   HENT:      Head: Normocephalic and atraumatic.      Right Ear: External ear normal.      Left Ear: External ear normal.      Nose: Nose normal.   Eyes:      Extraocular Movements: Extraocular movements intact.      Pupils: Pupils are equal, round, and reactive to light.   Cardiovascular:      Rate and Rhythm: Normal rate and regular rhythm.      Heart sounds: Normal heart sounds.   Pulmonary:      Effort: Pulmonary effort is normal.      Breath sounds: Normal breath sounds.   Abdominal:      General: Bowel sounds are normal.      Palpations: Abdomen is soft.   Genitourinary:     Vagina: Normal.   Musculoskeletal:         General: Normal range of motion.      Cervical back: Normal range of motion and neck supple.   Skin:     General: Skin is warm and dry.   Neurological:      General: No focal deficit present.      Mental Status: She is alert and oriented to person, place, and time.   Psychiatric:         Mood and Affect: Mood normal.         Behavior: Behavior normal.         Judgment: Judgment normal.          Neck was examined.  Lungs were auscultated.    Vital Signs  Weight is 157 pounds.    PHQ-9 Total Score:      Results  Laboratory Studies  Ferritin levels are elevated. Platelets are leveled out.    Assessment & Plan   Diagnoses and all orders for this visit:    1. Encounter for annual general medical examination with abnormal findings in adult (Primary)  -     CBC Auto Differential  -     Comprehensive Metabolic Panel  -     Lipid Panel  -     TSH    2. Hyperglycemia  -     Hemoglobin  A1c    3. Vitamin D deficiency  -     Vitamin D 25 hydroxy    4. B12 deficiency  -     Vitamin B12    5. DEVYN (obstructive sleep apnea)  -     Ambulatory Referral to Sleep Medicine    6. Mixed anxiety depressive disorder    7. Cervical radiculopathy    8. Attention deficit hyperactivity disorder (ADHD), combined type    9. Glaucoma of right eye, unspecified glaucoma type    10. Elevated ferritin         1. Attention deficit hyperactivity disorder (ADHD).  She reports significant improvement in focus and task management with Adderall, which has positively impacted her daily life. She will continue with Adderall as prescribed by Asya Yañez.    2. Neck pain.  She experiences tenderness in her fingers that radiates down her spine upon touch and persistent neck discomfort, particularly with repetitive movements. She has been advised to incorporate yoga and progressive relaxation exercises into her routine. She will continue using her home TENS unit and massage therapy for symptom relief.    3. Elevated ferritin levels.  Her ferritin levels remain elevated despite genetic testing and other markers showing no abnormalities. Her platelets have normalized. Continued monitoring of ferritin levels is recommended, especially to ensure it does not affect liver function.    4. Vitamin D deficiency.  She is currently taking vitamin D supplements once a week and has noticed improvement in her neck issues. A vitamin D level test will be conducted to ensure adequate levels.    5. Elevated blood sugar.  Her blood sugar levels were slightly elevated previously. An A1c test will be performed to monitor her blood sugar levels.    6. Anxiety.  Her anxiety appears to be better managed with the control of ADHD symptoms. Counseling was suggested as a potential aid in developing coping skills and brain retraining. The website [email or web address] was recommended for finding suitable counselors based on specific needs and insurance  coverage.    7. Health maintenance.  She declined the tetanus vaccine today but was informed about its benefits, including protection against pertussis. The COVID-19 booster is available if she decides to receive it. A Pap smear was recently performed [facility] [place], with negative results. Lab work will be ordered to check cholesterol, vitamin D, and B12 levels.    8. Weight management.  She has lost weight, dropping from 180 pounds to 157 pounds, and has increased her physical activity. She reports normal bowel and bladder function, although Adderall has increased her frequency of bathroom visits.    9. Sleep apnea.  She has been diagnosed with sleep apnea but does not use her machine. A referral to Dr. Luke will be made for further evaluation and potential retesting.    10. Glaucoma.  She requires a referral for glaucoma management as her previous physician has retired. She reports no current issues related to this condition.    PROCEDURE  The patient previously received an epidural injection administered by Dr. Faye.    Patient Instructions   Yoga with [place]           Patient or patient representative verbalized consent for the use of Ambient Listening during the visit with  TAMIE Cummins for chart documentation. 4/2/2025  10:40 EDT

## 2025-04-08 ENCOUNTER — TELEPHONE (OUTPATIENT)
Dept: FAMILY MEDICINE CLINIC | Facility: CLINIC | Age: 40
End: 2025-04-08
Payer: OTHER GOVERNMENT

## 2025-04-08 DIAGNOSIS — S62.634A DISPLACED FRACTURE OF DISTAL PHALANX OF RIGHT RING FINGER, INITIAL ENCOUNTER FOR CLOSED FRACTURE: Primary | ICD-10-CM

## 2025-04-08 NOTE — TELEPHONE ENCOUNTER
Good afternoon Cheryl. This pt has called into the office and is needing a referral to see Ortho dr Silver Pablo. She was seen at [facility] and her right ring finger is broken. Silver Pablo is needing a referral from you. Thank you

## 2025-04-09 ENCOUNTER — TELEPHONE (OUTPATIENT)
Dept: FAMILY MEDICINE CLINIC | Facility: CLINIC | Age: 40
End: 2025-04-09
Payer: OTHER GOVERNMENT

## 2025-04-09 DIAGNOSIS — S62.634A DISPLACED FRACTURE OF DISTAL PHALANX OF RIGHT RING FINGER, INITIAL ENCOUNTER FOR CLOSED FRACTURE: Primary | ICD-10-CM

## 2025-04-09 NOTE — TELEPHONE ENCOUNTER
Roshan Luna this pt said she needs a referral to a hand surgeon now since she said they looked at her x rays. She said if we can get the referral to [facility] today they could see her tomorrow at 2:00pm. The name is Gilberto West.
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
## 2025-04-23 ENCOUNTER — OFFICE VISIT (OUTPATIENT)
Dept: PSYCHIATRY | Facility: CLINIC | Age: 40
End: 2025-04-23
Payer: OTHER GOVERNMENT

## 2025-04-23 DIAGNOSIS — F90.2 ADHD (ATTENTION DEFICIT HYPERACTIVITY DISORDER), COMBINED TYPE: Primary | Chronic | ICD-10-CM

## 2025-04-23 RX ORDER — DEXTROAMPHETAMINE SACCHARATE, AMPHETAMINE ASPARTATE MONOHYDRATE, DEXTROAMPHETAMINE SULFATE AND AMPHETAMINE SULFATE 5; 5; 5; 5 MG/1; MG/1; MG/1; MG/1
CAPSULE, EXTENDED RELEASE ORAL
Qty: 60 CAPSULE | Refills: 0 | Status: SHIPPED | OUTPATIENT
Start: 2025-04-23

## 2025-05-13 DIAGNOSIS — E55.9 VITAMIN D DEFICIENCY: ICD-10-CM

## 2025-05-13 RX ORDER — ERGOCALCIFEROL 1.25 MG/1
50000 CAPSULE, LIQUID FILLED ORAL WEEKLY
Qty: 5 CAPSULE | Refills: 11 | Status: SHIPPED | OUTPATIENT
Start: 2025-05-13 | End: 2025-05-15 | Stop reason: SDUPTHER

## 2025-05-13 NOTE — TELEPHONE ENCOUNTER
Rx Refill Note  Requested Prescriptions     Pending Prescriptions Disp Refills    vitamin D (ERGOCALCIFEROL) 1.25 MG (92423 UT) capsule capsule 5 capsule 11     Sig: Take 1 capsule by mouth 1 (One) Time Per Week.      Last office visit with prescribing clinician: 4/2/2025   Last telemedicine visit with prescribing clinician: Visit date not found   Next office visit with prescribing clinician: 4/8/2026                         Would you like a call back once the refill request has been completed: [] Yes [] No    If the office needs to give you a call back, can they leave a voicemail: [] Yes [] No    Nelida Turner MA  05/13/25, 10:44 EDT

## 2025-05-15 DIAGNOSIS — E55.9 VITAMIN D DEFICIENCY: ICD-10-CM

## 2025-05-15 RX ORDER — ERGOCALCIFEROL 1.25 MG/1
50000 CAPSULE, LIQUID FILLED ORAL WEEKLY
Qty: 5 CAPSULE | Refills: 11 | Status: SHIPPED | OUTPATIENT
Start: 2025-05-15

## 2025-05-27 DIAGNOSIS — F90.2 ADHD (ATTENTION DEFICIT HYPERACTIVITY DISORDER), COMBINED TYPE: Chronic | ICD-10-CM

## 2025-05-27 RX ORDER — DEXTROAMPHETAMINE SACCHARATE, AMPHETAMINE ASPARTATE MONOHYDRATE, DEXTROAMPHETAMINE SULFATE AND AMPHETAMINE SULFATE 5; 5; 5; 5 MG/1; MG/1; MG/1; MG/1
CAPSULE, EXTENDED RELEASE ORAL
Qty: 60 CAPSULE | Refills: 0 | Status: SHIPPED | OUTPATIENT
Start: 2025-05-27

## 2025-06-02 ENCOUNTER — PATIENT MESSAGE (OUTPATIENT)
Dept: FAMILY MEDICINE CLINIC | Facility: CLINIC | Age: 40
End: 2025-06-02
Payer: OTHER GOVERNMENT

## 2025-06-02 DIAGNOSIS — E53.8 B12 DEFICIENCY: ICD-10-CM

## 2025-06-02 DIAGNOSIS — D75.839 THROMBOCYTOSIS: ICD-10-CM

## 2025-06-02 DIAGNOSIS — F90.2 ATTENTION DEFICIT HYPERACTIVITY DISORDER (ADHD), COMBINED TYPE: ICD-10-CM

## 2025-06-02 DIAGNOSIS — F41.8 MIXED ANXIETY DEPRESSIVE DISORDER: Primary | ICD-10-CM

## 2025-06-19 ENCOUNTER — OFFICE VISIT (OUTPATIENT)
Dept: FAMILY MEDICINE CLINIC | Facility: CLINIC | Age: 40
End: 2025-06-19
Payer: OTHER GOVERNMENT

## 2025-06-19 VITALS
BODY MASS INDEX: 28.49 KG/M2 | DIASTOLIC BLOOD PRESSURE: 82 MMHG | TEMPERATURE: 97.5 F | OXYGEN SATURATION: 99 % | SYSTOLIC BLOOD PRESSURE: 130 MMHG | HEIGHT: 62 IN | HEART RATE: 106 BPM | WEIGHT: 154.8 LBS | RESPIRATION RATE: 18 BRPM

## 2025-06-19 DIAGNOSIS — F41.9 ANXIETY: ICD-10-CM

## 2025-06-19 DIAGNOSIS — F90.2 ATTENTION DEFICIT HYPERACTIVITY DISORDER (ADHD), COMBINED TYPE: ICD-10-CM

## 2025-06-19 DIAGNOSIS — R63.4 WEIGHT LOSS: ICD-10-CM

## 2025-06-19 DIAGNOSIS — R59.1 LYMPHADENOPATHY: Primary | ICD-10-CM

## 2025-06-19 PROCEDURE — 99214 OFFICE O/P EST MOD 30 MIN: CPT | Performed by: NURSE PRACTITIONER

## 2025-06-19 NOTE — PROGRESS NOTES
"Subjective   Chrystal Walters is a 39 y.o. female presents for   Chief Complaint   Patient presents with    Cyst     Neck for month        There are no preventive care reminders to display for this patient.    History of Present Illness  The patient presents for evaluation of a lump on her neck.    She has been experiencing persistent inflammation on the right side of her neck, which she attributes to a pinched nerve. Chiropractic care has been ongoing, and medication has not been necessary. Approximately a month ago, she noticed a new lump in the same area, which she is uncertain whether it is a muscle or lymph node. This lump has been present since 05/12/2025. No recent head injuries or tick bites are reported. She has a history of scalp picking, which has increased since starting Adderall, leading to several open spots on her scalp. Additionally, she reports picking at her face when she has acne. She has an upcoming appointment with Asya this month. Anxiety is reported, and she has an appointment with a virtual therapist on Monday to discuss various issues. Weight loss is noted, which she attributes to decreased appetite and increased activity since starting Adderall. No fevers or other symptoms suggestive of cancer are reported. The lump has not changed in size. She also reports knots along her neck. Blurriness in her eye occurs when she receives chiropractic adjustments or massages. A decrease in appetite and portion sizes has been noticed since starting Adderall. No changes in bowel habits are reported, but Adderall causes her to use the bathroom within the first hour of taking it.    Vitals:    06/19/25 0925   BP: 130/82   BP Location: Right arm   Patient Position: Sitting   Cuff Size: Adult   Pulse: 106   Resp: 18   Temp: 97.5 °F (36.4 °C)   TempSrc: Infrared   SpO2: 99%   Weight: 70.2 kg (154 lb 12.8 oz)   Height: 157.5 cm (62\")     Body mass index is 28.31 kg/m².    Current Outpatient Medications on " File Prior to Visit   Medication Sig Dispense Refill    amphetamine-dextroamphetamine XR (Adderall XR) 20 MG 24 hr capsule Take 1 capsule in the morning and second capsule at 1pm. 60 capsule 0    vitamin D (ERGOCALCIFEROL) 1.25 MG (74575 UT) capsule capsule Take 1 capsule by mouth 1 (One) Time Per Week. 5 capsule 11     No current facility-administered medications on file prior to visit.       The following portions of the patient's history were reviewed and updated as appropriate: allergies, current medications, past family history, past medical history, past social history, past surgical history, and problem list.    Review of Systems    Objective   Physical Exam  Vitals and nursing note reviewed.   Constitutional:       Appearance: Normal appearance. She is well-developed.   HENT:      Head: Normocephalic and atraumatic.      Right Ear: External ear normal.      Left Ear: External ear normal.      Nose: Nose normal.   Eyes:      Extraocular Movements: Extraocular movements intact.      Pupils: Pupils are equal, round, and reactive to light.   Cardiovascular:      Rate and Rhythm: Normal rate and regular rhythm.      Heart sounds: Normal heart sounds.   Pulmonary:      Effort: Pulmonary effort is normal.      Breath sounds: Normal breath sounds.   Abdominal:      General: Bowel sounds are normal.      Palpations: Abdomen is soft.   Genitourinary:     Vagina: Normal.   Musculoskeletal:         General: Normal range of motion.      Cervical back: Normal range of motion and neck supple.   Lymphadenopathy:      Head:      Right side of head: Occipital adenopathy present.      Cervical: Cervical adenopathy present.      Left cervical: Posterior cervical adenopathy present.      Upper Body:      Right upper body: No supraclavicular adenopathy.      Left upper body: No supraclavicular adenopathy.   Skin:     General: Skin is warm and dry.      Comments: Several small scabbed areas top of scalp   Neurological:       General: No focal deficit present.      Mental Status: She is alert and oriented to person, place, and time.   Psychiatric:         Mood and Affect: Mood normal.         Behavior: Behavior normal.         Judgment: Judgment normal.          Neck: Palpable lymph nodes, small and mobile  Skin: Scalp with open areas due to picking    PHQ-9 Total Score:      Results      Assessment & Plan   Diagnoses and all orders for this visit:    1. Lymphadenopathy (Primary)  -     CBC w AUTO Differential    2. Weight loss    3. Attention deficit hyperactivity disorder (ADHD), combined type    4. Anxiety         1. Cervical lymphadenopathy.  - The condition is likely due to an inflammatory process, possibly related to scalp picking.  - A complete blood count (CBC) will be conducted to rule out any abnormalities, including an elevated white blood cell count.  - Advised to discuss scalp picking habit with Asya during the next appointment to explore alternative ADHD medications that may help reduce this behavior. The results of the CBC will be communicated tomorrow.    2. Weight loss.  - Weight loss noted since starting Adderall, likely due to decreased appetite.  - No fever, sweats, or other concerning symptoms reported.  - CBC will be conducted to ensure no underlying issues contributing to weight loss.  - Continue monitoring weight and appetite, and discuss any concerns with the therapist during the upcoming virtual appointment.    3. ADHD.  - Adderall is effective for ADHD symptoms but has increased scalp picking behavior.  - Advised to discuss medication options with Asya to potentially switch to a different ADHD medication.  - Continue current ADHD management and explore behavioral strategies to reduce picking.    4. Anxiety.  - Anxiety exacerbated by recent events, including a friend's passing and concerns about health.  - Scheduled to see a therapist virtually on Monday to address anxiety and other related issues.  -  Continue current anxiety management and discuss any new symptoms or concerns with the therapist.    There are no Patient Instructions on file for this visit.         Patient or patient representative verbalized consent for the use of Ambient Listening during the visit with  TAMIE Cummins for chart documentation. 6/19/2025  12:24 EDT

## 2025-06-20 LAB
BASOPHILS # BLD AUTO: 0 X10E3/UL (ref 0–0.2)
BASOPHILS NFR BLD AUTO: 1 %
EOSINOPHIL # BLD AUTO: 0.1 X10E3/UL (ref 0–0.4)
EOSINOPHIL NFR BLD AUTO: 1 %
ERYTHROCYTE [DISTWIDTH] IN BLOOD BY AUTOMATED COUNT: 11.9 % (ref 11.7–15.4)
HCT VFR BLD AUTO: 39.4 % (ref 34–46.6)
HGB BLD-MCNC: 13.2 G/DL (ref 11.1–15.9)
IMM GRANULOCYTES # BLD AUTO: 0 X10E3/UL (ref 0–0.1)
IMM GRANULOCYTES NFR BLD AUTO: 0 %
LYMPHOCYTES # BLD AUTO: 1.7 X10E3/UL (ref 0.7–3.1)
LYMPHOCYTES NFR BLD AUTO: 36 %
MCH RBC QN AUTO: 32.7 PG (ref 26.6–33)
MCHC RBC AUTO-ENTMCNC: 33.5 G/DL (ref 31.5–35.7)
MCV RBC AUTO: 98 FL (ref 79–97)
MONOCYTES # BLD AUTO: 0.3 X10E3/UL (ref 0.1–0.9)
MONOCYTES NFR BLD AUTO: 7 %
NEUTROPHILS # BLD AUTO: 2.6 X10E3/UL (ref 1.4–7)
NEUTROPHILS NFR BLD AUTO: 55 %
PLATELET # BLD AUTO: 499 X10E3/UL (ref 150–450)
RBC # BLD AUTO: 4.04 X10E6/UL (ref 3.77–5.28)
WBC # BLD AUTO: 4.7 X10E3/UL (ref 3.4–10.8)

## 2025-06-23 ENCOUNTER — TELEMEDICINE (OUTPATIENT)
Dept: PSYCHIATRY | Facility: CLINIC | Age: 40
End: 2025-06-23
Payer: OTHER GOVERNMENT

## 2025-06-23 DIAGNOSIS — F41.1 GAD (GENERALIZED ANXIETY DISORDER): Primary | ICD-10-CM

## 2025-06-23 DIAGNOSIS — F90.2 ADHD (ATTENTION DEFICIT HYPERACTIVITY DISORDER), COMBINED TYPE: ICD-10-CM

## 2025-06-23 NOTE — PROGRESS NOTES
This provider is located at home address in [place] in connection with the [facility] (through [facility]), [address], [place] [address] using a secure allGreenupt Video Visit through [employer]. Patient is being seen remotely via telehealth at home address in [place] and stated they are in a secure environment for this session. The patient's condition being diagnosed/treated is appropriate for telemedicine. The provider identified himself as well as his credentials. The patient, and/or patients guardian, consent to be seen remotely, and when consent is given they understand that the consent allows for patient identifiable information to be sent to a third party as needed. They may refuse to be seen remotely at any time. The electronic data is encrypted and password protected, and the patient and/or guardian has been advised of the potential risks to privacy not withstanding such measures.     You have chosen to receive care through a telehealth visit.  Do you consent to use a video/audio connection for your medical care today? Yes    Subjective   Chrystal Walters is a 39 y.o. female who presents today for initial evaluation  related to management and treatment of anxiety, ADHD, and possible PTSD. Reports challenges with medical anxiety and current attempts to figure out causes of abnormal labs and inflammation. Reports such is heightened having lost best friend to cancer last November. Reports traumatic experiences in childhood. Also working to find treatment option for ADHD with some challenges. Reports some counseling, mostly as a couple, in the past. Thankful for marriage being in healthier spot these days.     Time: 13:01-14:37  Name of PCP: TAMIE Cortes  Referral source: TAMIE Cheek    Chief Complaint:  anxiety    Patient adamantly and convincingly denies current suicidal or homicidal ideation or perceptual disturbance.    Social History:    Social History     Socioeconomic History    Marital status:    Tobacco Use    Smoking status: Never     Passive exposure: Never    Smokeless tobacco: Never   Vaping Use    Vaping status: Never Used   Substance and Sexual Activity    Alcohol use: No    Drug use: No    Sexual activity: Yes     Partners: Male     Birth control/protection: None, Hysterectomy     Patient's current living situation: , 3 sons 8, 14, and 18    Support system: / parents, significant other, friends, and patient siblings    Difficulty getting along with peers: no    Difficulty making new friendships: no    Difficulty maintaining friendships: no    Close with family members: yes, sister and mom    Religous: yes, Restorationism,  is     Work History:  Highest level of education obtained: some college    Ever been active duty in the ? 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    Past Medical History:  Past Medical History:   Diagnosis Date    ADHD (attention deficit hyperactivity disorder)     B12 deficiency     Cervical disc disorder 2023    Glaucoma     Low back pain     Neck pain 2023    Obesity     PTSD (post-traumatic stress disorder)     Shingles 2008    Sleep apnea     Spinal stenosis 2023    Thrombocytosis     Visual impairment     Vitamin D deficiency        Past Surgical History:  Past Surgical History:   Procedure Laterality Date    ABDOMINAL SURGERY       SECTION      CYSTOSCOPY      EPIDURAL BLOCK  2007, Aug 2010 and Oct 2016    Pregnancy    EYE SURGERY      GLAUCOMA SURGERY Right     2020    HYSTERECTOMY      RETINAL DETACHMENT SURGERY  2020    SUBTOTAL HYSTERECTOMY         Physical Exam:    not currently breastfeeding. There is no height or weight on file to calculate BMI.     History of prior treatment or hospitalization: psychiatrist, couples counseling     Are there any significant health issues (current or past): see chart    History of seizures: no    Allergy:   No Known Allergies     Current Medications:   Current Outpatient Medications   Medication Sig Dispense Refill    amphetamine-dextroamphetamine XR (Adderall XR) 20 MG 24 hr capsule Take 1 capsule in the morning and second capsule at 1pm. 60 capsule 0    vitamin D (ERGOCALCIFEROL) 1.25 MG (76421 UT) capsule capsule Take 1 capsule by mouth 1 (One) Time Per Week. 5 capsule 11     No current facility-administered medications for this visit.       Lab Results:   Office Visit on 06/19/2025   Component Date Value Ref Range Status    WBC 06/19/2025 4.7  3.4 - 10.8 x10E3/uL Final    RBC 06/19/2025 4.04  3.77 - 5.28 x10E6/uL Final    Hemoglobin 06/19/2025 13.2  11.1 - 15.9 g/dL Final    Hematocrit 06/19/2025 39.4  34.0 - 46.6 % Final    MCV 06/19/2025 98 (H)  79 - 97 fL Final    MCH 06/19/2025 32.7  26.6 - 33.0 pg Final    MCHC 06/19/2025 33.5  31.5 - 35.7 g/dL Final    RDW 06/19/2025 11.9  11.7 - 15.4 % Final    Platelets 06/19/2025 499 (H)  150 - 450 x10E3/uL Final    Neutrophil Rel % 06/19/2025 55  Not Estab. % Final    Lymphocyte Rel % 06/19/2025 36  Not Estab. % Final    Monocyte Rel % 06/19/2025 7  Not Estab. % Final    Eosinophil Rel % 06/19/2025 1  Not Estab. % Final    Basophil Rel % 06/19/2025 1  Not Estab. % Final    Neutrophils Absolute 06/19/2025 2.6  1.4 - 7.0 x10E3/uL Final    Lymphocytes Absolute 06/19/2025 1.7  0.7 - 3.1 x10E3/uL Final    Monocytes Absolute 06/19/2025 0.3  0.1 - 0.9 x10E3/uL Final    Eosinophils Absolute 06/19/2025 0.1  0.0 - 0.4 x10E3/uL Final    Basophils Absolute 06/19/2025 0.0  0.0 - 0.2 x10E3/uL Final    Immature Granulocyte Rel % 06/19/2025 0  Not Estab. % Final    Immature Grans Absolute 06/19/2025 0.0  0.0  - 0.1 x10E3/uL Final       Family History:  Family History   Problem Relation Age of Onset    Alcohol abuse Father     Arthritis Father     Diabetes Father     Hyperlipidemia Father     COPD Father     Heart disease Father     Anxiety disorder Mother     ADD / ADHD Sister        Problem List:  Patient Active Problem List   Diagnosis    B12 deficiency    Obesity (BMI 30-39.9)    Hyperlipidemia    Low back pain    Mixed anxiety depressive disorder    Thrombocytosis    Urinary incontinence    Vitamin D deficiency    Obesity    Arm pain, diffuse, unspecified laterality    Aphakia of right eye    Glaucoma    Posterior vitreous detachment    Retinal detachment    Sleep apnea    Elevated ferritin    Anxiety    Chest wall pain    Clinical diagnosis of severe acute respiratory syndrome coronavirus 2 (SARS-CoV-2) disease    Atypical mole    Keratoconjunctivitis sicca    Myopia of left eye    Old total retinal detachment    Raised intraocular pressure of both eyes    Regular astigmatism of left eye    ADHD (attention deficit hyperactivity disorder)         History of Substance Use:   Patient answered no  to experiencing two or more of the following problems related to substance use: using more than intended or over longer period than intended; difficulty quitting or cutting back use; spending a great deal of time obtaining, using, or recovering from using; craving or strong desire or urge to use;  work and/or school problems; financial problems; family problems; using in dangerous situations; physical or mental health problems; relapse; feelings of guilt or remorse about use; times when used and/or drank alone; needing to use more in order to achieve the desired effect; illness or withdrawal when stopping or cutting back use; using to relieve or avoid getting ill or developing withdrawal symptoms; and black outs and/or memory issues when using.        SUICIDE RISK ASSESSMENT/CSSRS  1. Does patient have thoughts of suicide?  no  2. Does patient have intent for suicide? no  3. Does patient have a current plan for suicide? no  4. History of suicide attempts: no  5. Family history of suicide or attempts: no  6. History of violent behaviors towards others or property or thoughts of committing suicide: no  7. History of sexual aggression toward others: no  8. Access to firearms or weapons: yes    PHQ-Score Total:  PHQ-9 Total Score: (Patient-Rptd) 1     LAILA-7 Score Total:  7    (Scales based on 0 - 10 with 10 being the worst)  Depression: 0 Anxiety: 6/7     Mental Status Exam:   Hygiene:   good  Cooperation:  Cooperative  Eye Contact:  Good  Psychomotor Behavior:  Appropriate  Affect:  Full range  Mood: anxious  Hopelessness: Denies  Speech:  Normal  Thought Process:  Goal directed  Thought Content:  Mood congruent  Suicidal:  None  Homicidal:  None  Hallucinations:  None  Delusion:  None  Memory:  Intact  Orientation:  Grossly intact  Reliability:  fair  Insight:  Good  Judgement:  Fair  Impulse Control:  Fair    Impression/Formulation:    Patient appeared alert and oriented.  Patient is voluntarily requesting to begin outpatient therapy at [facility].  Patient is receptive to assistance with maintaining a stable lifestyle.  Patient presents with history of anxiety and ADHD.  Patient is agreeable to attend routine therapy sessions.  Patient expressed desire to maintain stability and participate in the therapeutic process.        Assessment & Plan   Problems Addressed this Visit       Anxiety - Primary     Other Visit Diagnoses         ADHD (attention deficit hyperactivity disorder), combined type              Diagnoses         Codes Comments      LAILA (generalized anxiety disorder)    -  Primary ICD-10-CM: F41.1  ICD-9-CM: 300.02       ADHD (attention deficit hyperactivity disorder), combined type     ICD-10-CM: F90.2  ICD-9-CM: 314.01             Visit Diagnoses:    ICD-10-CM ICD-9-CM   1. LAILA  (generalized anxiety disorder)  F41.1 300.02   2. ADHD (attention deficit hyperactivity disorder), combined type  F90.2 314.01        Functional Status: Moderate impairment     Prognosis: Fair with Ongoing Treatment     Treatment Plan: Continue supportive psychotherapy efforts and medications as indicated. Obtain release of information for current treatment team for continuity of care as needed. Patient will adhere to medication regimen as prescribed and report any side effects. Patient will contact this office, call 911 or present to the nearest emergency room should suicidal or homicidal ideations occur.    Short Term Goals: Patient will be compliant with medication, and patient will have no significant medication related side effects.  Patient will be engaged in psychotherapy as indicated.  Patient will report subjective improvement of symptoms.    Long Term Goals: To stabilize mood and treat/improve subjective symptoms, the patient will stay out of the hospital, the patient will be at an optimal level of functioning, and the patient will take all medications as prescribed.The patient verbalized understanding and agreement with goals that were mutually set.    [facility] No Show Policy:  We understand unexpected circumstances arise; however, anytime you miss your appointment we are unable to provide you appropriate care.  In addition, each appointment missed could have been used to provide care for others.  We ask that you call at least 24 hours in advance to cancel or reschedule an appointment.  We would like to take this opportunity to remind you of our policy stating patients who miss THREE or more appointments without cancelling or rescheduling 24 hours in advance of the appointment may be subject to cancellation of any further visits with our clinic and recommendation to seek in-person services/visits.    Please call [phone] to reschedule your appointment. If there are reasons  that make it difficult for you to keep the appointments, please call and let us know how we can help.  Please understand that medication prescribing will not continue without seeing your provider.      [facility]'s No Show Policy reviewed with patient at today's visit. Patient verbalized understanding of this policy. Discussed with patient that in the event that there are three or more no show visits, it will be recommended that they pursue in-person services/visits as noncompliance with telehealth visits indicates that patient is not an appropriate candidate for telemedicine and would likely be more appropriate for in-person services/visits. Patient verbalizes understanding and is agreeable to this.         If symptoms/behaviors persist, patient will present to the nearest hospital for an assessment. Advised patient of [facility] 24/7 assessment services.           This document has been electronically signed by Danny Drake LCSW  June 23, 2025 13:33 EDT    Part of this note may be an electronic transcription/translation of spoken language to printed text using the Dragon Dictation System.

## 2025-06-25 DIAGNOSIS — F90.2 ADHD (ATTENTION DEFICIT HYPERACTIVITY DISORDER), COMBINED TYPE: Chronic | ICD-10-CM

## 2025-06-26 RX ORDER — DEXTROAMPHETAMINE SACCHARATE, AMPHETAMINE ASPARTATE MONOHYDRATE, DEXTROAMPHETAMINE SULFATE AND AMPHETAMINE SULFATE 5; 5; 5; 5 MG/1; MG/1; MG/1; MG/1
CAPSULE, EXTENDED RELEASE ORAL
Qty: 60 CAPSULE | Refills: 0 | Status: SHIPPED | OUTPATIENT
Start: 2025-06-26

## 2025-06-26 NOTE — TELEPHONE ENCOUNTER
Rx Refill Note  Requested Prescriptions     Pending Prescriptions Disp Refills    amphetamine-dextroamphetamine XR (Adderall XR) 20 MG 24 hr capsule 60 capsule 0     Sig: Take 1 capsule in the morning and second capsule at 1pm.        Last office visit with prescribing clinician: 4/23/2025     Next office visit with prescribing clinician: 7/23/2025    Office Visit with Asya Yañez APRN (04/23/2025)   ToxAssure Flex 22, Ur w/DL - Urine, Random Void (09/09/2024 12:01)   BEHAVIORAL HEALTH - SCAN - INSPECT REPORT - EILEEN HE - 6/26/2025 (06/26/2025)         Inspect Fill 5/29/2025 QTY 60 DAYS 30    Millicent Gutierrez MA  06/26/25, 08:29 EDT

## 2025-07-24 DIAGNOSIS — F90.2 ADHD (ATTENTION DEFICIT HYPERACTIVITY DISORDER), COMBINED TYPE: Chronic | ICD-10-CM

## 2025-07-24 RX ORDER — DEXTROAMPHETAMINE SACCHARATE, AMPHETAMINE ASPARTATE, DEXTROAMPHETAMINE SULFATE AND AMPHETAMINE SULFATE 2.5; 2.5; 2.5; 2.5 MG/1; MG/1; MG/1; MG/1
TABLET ORAL
Qty: 30 TABLET | Refills: 0 | Status: SHIPPED | OUTPATIENT
Start: 2025-07-24

## 2025-07-24 RX ORDER — DEXTROAMPHETAMINE SACCHARATE, AMPHETAMINE ASPARTATE MONOHYDRATE, DEXTROAMPHETAMINE SULFATE AND AMPHETAMINE SULFATE 5; 5; 5; 5 MG/1; MG/1; MG/1; MG/1
20 CAPSULE, EXTENDED RELEASE ORAL EVERY MORNING
Qty: 30 CAPSULE | Refills: 0 | Status: SHIPPED | OUTPATIENT
Start: 2025-07-24

## 2025-07-24 NOTE — TELEPHONE ENCOUNTER
Rx Refill Note  Requested Prescriptions     Pending Prescriptions Disp Refills    amphetamine-dextroamphetamine XR (Adderall XR) 20 MG 24 hr capsule 60 capsule 0     Sig: Take 1 capsule in the morning and second capsule at 1pm.        Last office visit with prescribing clinician: Visit date not found     Next office visit with prescribing clinician: Visit date not found     Office Visit with Asya Yañez APRN (04/23/2025)     ToxAssure Flex 22, Ur w/DL - Urine, Random Void (09/09/2024 12:01)     BEHAVIORAL HEALTH - SCAN - Inspect report, SVandana, 7/24/25 (07/24/2025)     Inspect Fill Dextroamp-amphet ER 20 mg Qty 60 for 30 day filled 6/30/25    Mayuri Perea  07/24/25, 08:10 EDT

## 2025-07-29 ENCOUNTER — TELEMEDICINE (OUTPATIENT)
Dept: PSYCHIATRY | Facility: CLINIC | Age: 40
End: 2025-07-29
Payer: OTHER GOVERNMENT

## 2025-07-29 DIAGNOSIS — F90.2 ATTENTION DEFICIT HYPERACTIVITY DISORDER (ADHD), COMBINED TYPE: Primary | ICD-10-CM

## 2025-07-29 DIAGNOSIS — F41.9 ANXIETY: ICD-10-CM

## 2025-08-02 LAB
ALBUMIN SERPL-MCNC: 4.8 G/DL (ref 3.9–4.9)
ALP SERPL-CCNC: 77 IU/L (ref 44–121)
ALT SERPL-CCNC: 24 IU/L (ref 0–32)
AMBIG ABBREV CMP14 DEFAULT: NORMAL
AST SERPL-CCNC: 21 IU/L (ref 0–40)
BASOPHILS # BLD AUTO: 0 X10E3/UL (ref 0–0.2)
BASOPHILS NFR BLD AUTO: 1 %
BILIRUB SERPL-MCNC: 0.4 MG/DL (ref 0–1.2)
BUN SERPL-MCNC: 11 MG/DL (ref 6–20)
BUN/CREAT SERPL: 15 (ref 9–23)
CALCIUM SERPL-MCNC: 10.4 MG/DL (ref 8.7–10.2)
CHLORIDE SERPL-SCNC: 99 MMOL/L (ref 96–106)
CO2 SERPL-SCNC: 23 MMOL/L (ref 20–29)
CREAT SERPL-MCNC: 0.75 MG/DL (ref 0.57–1)
EGFRCR SERPLBLD CKD-EPI 2021: 104 ML/MIN/1.73
EOSINOPHIL # BLD AUTO: 0 X10E3/UL (ref 0–0.4)
EOSINOPHIL NFR BLD AUTO: 1 %
ERYTHROCYTE [DISTWIDTH] IN BLOOD BY AUTOMATED COUNT: 11.8 % (ref 11.7–15.4)
FERRITIN SERPL-MCNC: 241 NG/ML (ref 15–150)
GLOBULIN SER CALC-MCNC: 2.8 G/DL (ref 1.5–4.5)
GLUCOSE SERPL-MCNC: 87 MG/DL (ref 70–99)
HCT VFR BLD AUTO: 39.5 % (ref 34–46.6)
HGB BLD-MCNC: 13.2 G/DL (ref 11.1–15.9)
IMM GRANULOCYTES # BLD AUTO: 0 X10E3/UL (ref 0–0.1)
IMM GRANULOCYTES NFR BLD AUTO: 0 %
IRON SATN MFR SERPL: 31 % (ref 15–55)
IRON SERPL-MCNC: 103 UG/DL (ref 27–159)
LYMPHOCYTES # BLD AUTO: 1.9 X10E3/UL (ref 0.7–3.1)
LYMPHOCYTES NFR BLD AUTO: 30 %
MCH RBC QN AUTO: 32.4 PG (ref 26.6–33)
MCHC RBC AUTO-ENTMCNC: 33.4 G/DL (ref 31.5–35.7)
MCV RBC AUTO: 97 FL (ref 79–97)
MONOCYTES # BLD AUTO: 0.5 X10E3/UL (ref 0.1–0.9)
MONOCYTES NFR BLD AUTO: 8 %
NEUTROPHILS # BLD AUTO: 3.9 X10E3/UL (ref 1.4–7)
NEUTROPHILS NFR BLD AUTO: 60 %
PLATELET # BLD AUTO: 545 X10E3/UL (ref 150–450)
POTASSIUM SERPL-SCNC: 4.9 MMOL/L (ref 3.5–5.2)
PROT SERPL-MCNC: 7.6 G/DL (ref 6–8.5)
RBC # BLD AUTO: 4.08 X10E6/UL (ref 3.77–5.28)
SODIUM SERPL-SCNC: 136 MMOL/L (ref 134–144)
TIBC SERPL-MCNC: 330 UG/DL (ref 250–450)
UIBC SERPL-MCNC: 227 UG/DL (ref 131–425)
WBC # BLD AUTO: 6.5 X10E3/UL (ref 3.4–10.8)

## 2025-08-05 ENCOUNTER — OFFICE VISIT (OUTPATIENT)
Dept: ONCOLOGY | Facility: CLINIC | Age: 40
End: 2025-08-05
Payer: OTHER GOVERNMENT

## 2025-08-05 VITALS
BODY MASS INDEX: 28.34 KG/M2 | OXYGEN SATURATION: 99 % | HEIGHT: 62 IN | RESPIRATION RATE: 14 BRPM | TEMPERATURE: 97.5 F | WEIGHT: 154 LBS | HEART RATE: 106 BPM

## 2025-08-05 DIAGNOSIS — D75.839 THROMBOCYTOSIS: ICD-10-CM

## 2025-08-05 DIAGNOSIS — R79.89 OTHER SPECIFIED ABNORMAL FINDINGS OF BLOOD CHEMISTRY: Primary | ICD-10-CM

## 2025-08-05 PROCEDURE — 99214 OFFICE O/P EST MOD 30 MIN: CPT | Performed by: INTERNAL MEDICINE

## 2025-08-07 ENCOUNTER — TELEPHONE (OUTPATIENT)
Dept: PSYCHIATRY | Facility: CLINIC | Age: 40
End: 2025-08-07
Payer: OTHER GOVERNMENT

## 2025-08-14 ENCOUNTER — TELEMEDICINE (OUTPATIENT)
Dept: PSYCHIATRY | Facility: CLINIC | Age: 40
End: 2025-08-14
Payer: OTHER GOVERNMENT

## 2025-08-14 DIAGNOSIS — F90.2 ATTENTION DEFICIT HYPERACTIVITY DISORDER (ADHD), COMBINED TYPE: Primary | ICD-10-CM

## 2025-08-14 DIAGNOSIS — F41.9 ANXIETY: ICD-10-CM

## 2025-08-25 DIAGNOSIS — F90.2 ADHD (ATTENTION DEFICIT HYPERACTIVITY DISORDER), COMBINED TYPE: Chronic | ICD-10-CM

## 2025-08-25 RX ORDER — DEXTROAMPHETAMINE SACCHARATE, AMPHETAMINE ASPARTATE MONOHYDRATE, DEXTROAMPHETAMINE SULFATE AND AMPHETAMINE SULFATE 5; 5; 5; 5 MG/1; MG/1; MG/1; MG/1
20 CAPSULE, EXTENDED RELEASE ORAL EVERY MORNING
Qty: 30 CAPSULE | Refills: 0 | Status: SHIPPED | OUTPATIENT
Start: 2025-08-25

## 2025-08-25 RX ORDER — DEXTROAMPHETAMINE SACCHARATE, AMPHETAMINE ASPARTATE, DEXTROAMPHETAMINE SULFATE AND AMPHETAMINE SULFATE 2.5; 2.5; 2.5; 2.5 MG/1; MG/1; MG/1; MG/1
TABLET ORAL
Qty: 30 TABLET | Refills: 0 | Status: SHIPPED | OUTPATIENT
Start: 2025-08-25